# Patient Record
Sex: MALE | Race: BLACK OR AFRICAN AMERICAN | NOT HISPANIC OR LATINO | Employment: UNEMPLOYED | URBAN - METROPOLITAN AREA
[De-identification: names, ages, dates, MRNs, and addresses within clinical notes are randomized per-mention and may not be internally consistent; named-entity substitution may affect disease eponyms.]

---

## 2023-03-10 ENCOUNTER — APPOINTMENT (OUTPATIENT)
Dept: PRIMARY CARE | Facility: CLINIC | Age: 58
End: 2023-03-10
Payer: COMMERCIAL

## 2023-03-17 ENCOUNTER — APPOINTMENT (OUTPATIENT)
Dept: PRIMARY CARE | Facility: CLINIC | Age: 58
End: 2023-03-17
Payer: COMMERCIAL

## 2023-04-05 LAB
ALANINE AMINOTRANSFERASE (SGPT) (U/L) IN SER/PLAS: 17 U/L (ref 10–52)
ALBUMIN (G/DL) IN SER/PLAS: 4.2 G/DL (ref 3.4–5)
ALKALINE PHOSPHATASE (U/L) IN SER/PLAS: 70 U/L (ref 33–120)
ANION GAP IN SER/PLAS: 13 MMOL/L (ref 10–20)
ASPARTATE AMINOTRANSFERASE (SGOT) (U/L) IN SER/PLAS: 17 U/L (ref 9–39)
BASOPHILS (10*3/UL) IN BLOOD BY AUTOMATED COUNT: 0.06 X10E9/L (ref 0–0.1)
BASOPHILS/100 LEUKOCYTES IN BLOOD BY AUTOMATED COUNT: 1 % (ref 0–2)
BILIRUBIN TOTAL (MG/DL) IN SER/PLAS: 0.6 MG/DL (ref 0–1.2)
CALCIUM (MG/DL) IN SER/PLAS: 9.9 MG/DL (ref 8.6–10.6)
CARBON DIOXIDE, TOTAL (MMOL/L) IN SER/PLAS: 23 MMOL/L (ref 21–32)
CHLORIDE (MMOL/L) IN SER/PLAS: 108 MMOL/L (ref 98–107)
CREATININE (MG/DL) IN SER/PLAS: 2.02 MG/DL (ref 0.5–1.3)
EOSINOPHILS (10*3/UL) IN BLOOD BY AUTOMATED COUNT: 0.39 X10E9/L (ref 0–0.7)
EOSINOPHILS/100 LEUKOCYTES IN BLOOD BY AUTOMATED COUNT: 6.6 % (ref 0–6)
ERYTHROCYTE DISTRIBUTION WIDTH (RATIO) BY AUTOMATED COUNT: 19.7 % (ref 11.5–14.5)
ERYTHROCYTE MEAN CORPUSCULAR HEMOGLOBIN CONCENTRATION (G/DL) BY AUTOMATED: 30.3 G/DL (ref 32–36)
ERYTHROCYTE MEAN CORPUSCULAR VOLUME (FL) BY AUTOMATED COUNT: 95 FL (ref 80–100)
ERYTHROCYTES (10*6/UL) IN BLOOD BY AUTOMATED COUNT: 4.09 X10E12/L (ref 4.5–5.9)
GFR MALE: 37 ML/MIN/1.73M2
GLUCOSE (MG/DL) IN SER/PLAS: 87 MG/DL (ref 74–99)
HEMATOCRIT (%) IN BLOOD BY AUTOMATED COUNT: 39 % (ref 41–52)
HEMOGLOBIN (G/DL) IN BLOOD: 11.8 G/DL (ref 13.5–17.5)
IMMATURE GRANULOCYTES/100 LEUKOCYTES IN BLOOD BY AUTOMATED COUNT: 0.2 % (ref 0–0.9)
LEUKOCYTES (10*3/UL) IN BLOOD BY AUTOMATED COUNT: 5.9 X10E9/L (ref 4.4–11.3)
LYMPHOCYTES (10*3/UL) IN BLOOD BY AUTOMATED COUNT: 1.37 X10E9/L (ref 1.2–4.8)
LYMPHOCYTES/100 LEUKOCYTES IN BLOOD BY AUTOMATED COUNT: 23.2 % (ref 13–44)
MONOCYTES (10*3/UL) IN BLOOD BY AUTOMATED COUNT: 0.79 X10E9/L (ref 0.1–1)
MONOCYTES/100 LEUKOCYTES IN BLOOD BY AUTOMATED COUNT: 13.4 % (ref 2–10)
NEUTROPHILS (10*3/UL) IN BLOOD BY AUTOMATED COUNT: 3.28 X10E9/L (ref 1.2–7.7)
NEUTROPHILS/100 LEUKOCYTES IN BLOOD BY AUTOMATED COUNT: 55.6 % (ref 40–80)
NRBC (PER 100 WBCS) BY AUTOMATED COUNT: 0 /100 WBC (ref 0–0)
PLATELETS (10*3/UL) IN BLOOD AUTOMATED COUNT: 197 X10E9/L (ref 150–450)
POTASSIUM (MMOL/L) IN SER/PLAS: 5.4 MMOL/L (ref 3.5–5.3)
PROSTATE SPECIFIC AG (NG/ML) IN SER/PLAS: 1.05 NG/ML (ref 0–4)
PROTEIN TOTAL: 7.3 G/DL (ref 6.4–8.2)
RBC MORPHOLOGY IN BLOOD: NORMAL
SODIUM (MMOL/L) IN SER/PLAS: 139 MMOL/L (ref 136–145)
UREA NITROGEN (MG/DL) IN SER/PLAS: 21 MG/DL (ref 6–23)

## 2023-04-26 LAB
ANION GAP IN SER/PLAS: 11 MMOL/L (ref 10–20)
CALCIUM (MG/DL) IN SER/PLAS: 9.7 MG/DL (ref 8.6–10.6)
CARBON DIOXIDE, TOTAL (MMOL/L) IN SER/PLAS: 30 MMOL/L (ref 21–32)
CHLORIDE (MMOL/L) IN SER/PLAS: 102 MMOL/L (ref 98–107)
CREATININE (MG/DL) IN SER/PLAS: 2.46 MG/DL (ref 0.5–1.3)
GFR MALE: 29 ML/MIN/1.73M2
GLUCOSE (MG/DL) IN SER/PLAS: 86 MG/DL (ref 74–99)
POC CALCIUM IONIZED (MMOL/L) IN BLOOD: 1.28 MMOL/L (ref 1.1–1.33)
POTASSIUM (MMOL/L) IN SER/PLAS: 4.8 MMOL/L (ref 3.5–5.3)
SODIUM (MMOL/L) IN SER/PLAS: 138 MMOL/L (ref 136–145)
UREA NITROGEN (MG/DL) IN SER/PLAS: 37 MG/DL (ref 6–23)

## 2023-06-12 LAB
ALANINE AMINOTRANSFERASE (SGPT) (U/L) IN SER/PLAS: 48 U/L (ref 10–52)
ALBUMIN (G/DL) IN SER/PLAS: 4.6 G/DL (ref 3.4–5)
ALBUMIN (MG/L) IN URINE: 30.2 MG/L
ALBUMIN/CREATININE (UG/MG) IN URINE: 44.2 UG/MG CRT (ref 0–30)
ALKALINE PHOSPHATASE (U/L) IN SER/PLAS: 87 U/L (ref 33–120)
ANION GAP IN SER/PLAS: 19 MMOL/L (ref 10–20)
APPEARANCE, URINE: CLEAR
ASPARTATE AMINOTRANSFERASE (SGOT) (U/L) IN SER/PLAS: 33 U/L (ref 9–39)
BASOPHILS (10*3/UL) IN BLOOD BY AUTOMATED COUNT: 0.05 X10E9/L (ref 0–0.1)
BASOPHILS/100 LEUKOCYTES IN BLOOD BY AUTOMATED COUNT: 1.1 % (ref 0–2)
BILIRUBIN TOTAL (MG/DL) IN SER/PLAS: 1.4 MG/DL (ref 0–1.2)
BILIRUBIN, URINE: NEGATIVE
BLOOD, URINE: NEGATIVE
CALCIUM (MG/DL) IN SER/PLAS: 10.9 MG/DL (ref 8.6–10.6)
CARBON DIOXIDE, TOTAL (MMOL/L) IN SER/PLAS: 22 MMOL/L (ref 21–32)
CHLORIDE (MMOL/L) IN SER/PLAS: 94 MMOL/L (ref 98–107)
CHOLESTEROL (MG/DL) IN SER/PLAS: 89 MG/DL (ref 0–199)
CHOLESTEROL IN HDL (MG/DL) IN SER/PLAS: 35.3 MG/DL
CHOLESTEROL/HDL RATIO: 2.5
COLOR, URINE: YELLOW
CREATININE (MG/DL) IN SER/PLAS: 5.05 MG/DL (ref 0.5–1.3)
CREATININE (MG/DL) IN URINE: 68.3 MG/DL (ref 20–370)
EOSINOPHILS (10*3/UL) IN BLOOD BY AUTOMATED COUNT: 0.13 X10E9/L (ref 0–0.7)
EOSINOPHILS/100 LEUKOCYTES IN BLOOD BY AUTOMATED COUNT: 2.8 % (ref 0–6)
ERYTHROCYTE DISTRIBUTION WIDTH (RATIO) BY AUTOMATED COUNT: 17.1 % (ref 11.5–14.5)
ERYTHROCYTE MEAN CORPUSCULAR HEMOGLOBIN CONCENTRATION (G/DL) BY AUTOMATED: 33.5 G/DL (ref 32–36)
ERYTHROCYTE MEAN CORPUSCULAR VOLUME (FL) BY AUTOMATED COUNT: 93 FL (ref 80–100)
ERYTHROCYTES (10*6/UL) IN BLOOD BY AUTOMATED COUNT: 3.81 X10E12/L (ref 4.5–5.9)
ESTIMATED AVERAGE GLUCOSE FOR HBA1C: 114 MG/DL
GFR MALE: 12 ML/MIN/1.73M2
GLUCOSE (MG/DL) IN SER/PLAS: 80 MG/DL (ref 74–99)
GLUCOSE, URINE: NEGATIVE MG/DL
HEMATOCRIT (%) IN BLOOD BY AUTOMATED COUNT: 35.5 % (ref 41–52)
HEMOGLOBIN (G/DL) IN BLOOD: 11.9 G/DL (ref 13.5–17.5)
HEMOGLOBIN A1C/HEMOGLOBIN TOTAL IN BLOOD: 5.6 %
IMMATURE GRANULOCYTES/100 LEUKOCYTES IN BLOOD BY AUTOMATED COUNT: 0.2 % (ref 0–0.9)
KETONES, URINE: NEGATIVE MG/DL
LDL: 37 MG/DL (ref 0–99)
LEUKOCYTE ESTERASE, URINE: NEGATIVE
LEUKOCYTES (10*3/UL) IN BLOOD BY AUTOMATED COUNT: 4.7 X10E9/L (ref 4.4–11.3)
LYMPHOCYTES (10*3/UL) IN BLOOD BY AUTOMATED COUNT: 0.99 X10E9/L (ref 1.2–4.8)
LYMPHOCYTES/100 LEUKOCYTES IN BLOOD BY AUTOMATED COUNT: 21.2 % (ref 13–44)
MONOCYTES (10*3/UL) IN BLOOD BY AUTOMATED COUNT: 0.73 X10E9/L (ref 0.1–1)
MONOCYTES/100 LEUKOCYTES IN BLOOD BY AUTOMATED COUNT: 15.7 % (ref 2–10)
NEUTROPHILS (10*3/UL) IN BLOOD BY AUTOMATED COUNT: 2.75 X10E9/L (ref 1.2–7.7)
NEUTROPHILS/100 LEUKOCYTES IN BLOOD BY AUTOMATED COUNT: 59 % (ref 40–80)
NITRITE, URINE: NEGATIVE
NRBC (PER 100 WBCS) BY AUTOMATED COUNT: 0 /100 WBC (ref 0–0)
PARATHYRIN INTACT (PG/ML) IN SER/PLAS: 99.8 PG/ML (ref 18.5–88)
PH, URINE: 6 (ref 5–8)
PLATELETS (10*3/UL) IN BLOOD AUTOMATED COUNT: 293 X10E9/L (ref 150–450)
POTASSIUM (MMOL/L) IN SER/PLAS: 3.3 MMOL/L (ref 3.5–5.3)
PROSTATE SPECIFIC AG (NG/ML) IN SER/PLAS: 0.87 NG/ML (ref 0–4)
PROTEIN TOTAL: 7.9 G/DL (ref 6.4–8.2)
PROTEIN, URINE: NEGATIVE MG/DL
SODIUM (MMOL/L) IN SER/PLAS: 132 MMOL/L (ref 136–145)
SPECIFIC GRAVITY, URINE: 1.01 (ref 1–1.03)
TRIGLYCERIDE (MG/DL) IN SER/PLAS: 85 MG/DL (ref 0–149)
UREA NITROGEN (MG/DL) IN SER/PLAS: 65 MG/DL (ref 6–23)
UROBILINOGEN, URINE: <2 MG/DL (ref 0–1.9)
VLDL: 17 MG/DL (ref 0–40)

## 2023-06-27 LAB
ANION GAP IN SER/PLAS: 17 MMOL/L (ref 10–20)
CALCIUM (MG/DL) IN SER/PLAS: 10.4 MG/DL (ref 8.6–10.6)
CARBON DIOXIDE, TOTAL (MMOL/L) IN SER/PLAS: 19 MMOL/L (ref 21–32)
CHLORIDE (MMOL/L) IN SER/PLAS: 102 MMOL/L (ref 98–107)
CREATININE (MG/DL) IN SER/PLAS: 4.19 MG/DL (ref 0.5–1.3)
GFR MALE: 15 ML/MIN/1.73M2
GLUCOSE (MG/DL) IN SER/PLAS: 82 MG/DL (ref 74–99)
POTASSIUM (MMOL/L) IN SER/PLAS: 3.9 MMOL/L (ref 3.5–5.3)
SODIUM (MMOL/L) IN SER/PLAS: 134 MMOL/L (ref 136–145)
UREA NITROGEN (MG/DL) IN SER/PLAS: 74 MG/DL (ref 6–23)

## 2023-07-05 LAB
ANION GAP IN SER/PLAS: 13 MMOL/L (ref 10–20)
CALCIUM (MG/DL) IN SER/PLAS: 9.1 MG/DL (ref 8.6–10.6)
CARBON DIOXIDE, TOTAL (MMOL/L) IN SER/PLAS: 21 MMOL/L (ref 21–32)
CHLORIDE (MMOL/L) IN SER/PLAS: 111 MMOL/L (ref 98–107)
CREATININE (MG/DL) IN SER/PLAS: 2.84 MG/DL (ref 0.5–1.3)
GFR MALE: 25 ML/MIN/1.73M2
GLUCOSE (MG/DL) IN SER/PLAS: 90 MG/DL (ref 74–99)
POC CALCIUM IONIZED (MMOL/L) IN BLOOD: 1.25 MMOL/L (ref 1.1–1.33)
POTASSIUM (MMOL/L) IN SER/PLAS: 3.8 MMOL/L (ref 3.5–5.3)
SODIUM (MMOL/L) IN SER/PLAS: 141 MMOL/L (ref 136–145)
UREA NITROGEN (MG/DL) IN SER/PLAS: 36 MG/DL (ref 6–23)

## 2023-08-02 LAB
ANION GAP IN SER/PLAS: 15 MMOL/L (ref 10–20)
CALCIUM (MG/DL) IN SER/PLAS: 9.5 MG/DL (ref 8.6–10.6)
CARBON DIOXIDE, TOTAL (MMOL/L) IN SER/PLAS: 26 MMOL/L (ref 21–32)
CHLORIDE (MMOL/L) IN SER/PLAS: 103 MMOL/L (ref 98–107)
CREATININE (MG/DL) IN SER/PLAS: 2.45 MG/DL (ref 0.5–1.3)
GFR MALE: 29 ML/MIN/1.73M2
GLUCOSE (MG/DL) IN SER/PLAS: 79 MG/DL (ref 74–99)
POTASSIUM (MMOL/L) IN SER/PLAS: 4.5 MMOL/L (ref 3.5–5.3)
SODIUM (MMOL/L) IN SER/PLAS: 139 MMOL/L (ref 136–145)
UREA NITROGEN (MG/DL) IN SER/PLAS: 25 MG/DL (ref 6–23)

## 2023-08-04 LAB
CALCIDIOL (25 OH VITAMIN D3) (NG/ML) IN SER/PLAS: 29 NG/ML
URATE (MG/DL) IN SER/PLAS: 7.2 MG/DL (ref 4–7.5)

## 2023-08-30 ENCOUNTER — LAB (OUTPATIENT)
Dept: LAB | Facility: LAB | Age: 58
End: 2023-08-30
Payer: COMMERCIAL

## 2023-09-01 LAB
ALLERGEN ANIMAL: CAT DANDER IGE (KU/L): <0.1 KU/L
ALLERGEN ANIMAL: DOG DANDER IGE (KU/L): <0.1 KU/L
ALLERGEN GRASS: BERMUDA GRASS (CYNODON DACTYLON) IGE (KU/L): <0.1 KU/L
ALLERGEN GRASS: JOHNSON GRASS (SORGHUM HALEPENSE) IGE (KU/L): <0.1 KU/L
ALLERGEN GRASS: MEADOW GRASS, KENTUCKY BLUE (POA PRATENSIS )IGE (KU/L): <0.1 KU/L
ALLERGEN GRASS: TIMOTHY GRASS (PHLEUM PRATENSE) IGE (KU/L): <0.1 KU/L
ALLERGEN INSECT: COCKROACH IGE: <0.1 KU/L
ALLERGEN MICROORGANISM: ALTERNARIA ALTERNATA IGE (KU/L): <0.1 KU/L
ALLERGEN MICROORGANISM: ASPERGILLUS FUMIGATUS IGE (KU/L): <0.1 KU/L
ALLERGEN MICROORGANISM: CLADOSPORIUM HERBARUM IGE (KU/L): <0.1 KU/L
ALLERGEN MICROORGANISM: PENICILLIUM CHRYSOGENUM (P. NOTATUM) IGE (KU/L): <0.1 KU/L
ALLERGEN MITE: DERMATOPHAGOIDES FARINAE (HOUSE DUST MITE) IGE (KU/L): <0.1 KU/L
ALLERGEN MITE: DERMATOPHAGOIDES PTERONYSSINUS (HOUSE DUST MITE) IGE (KU/L): <0.1 KU/L
ALLERGEN TREE: BOX-ELDER (ACER NEGUNDO) IGE (KU/L): <0.1 KU/L
ALLERGEN TREE: COMMON SILVER BIRCH (BETULA VERRUCOSA) IGE (KU/L): <0.1 KU/L
ALLERGEN TREE: COTTONWOOD (POPULUS DELTOIDES) IGE (KU/L): <0.1 KU/L
ALLERGEN TREE: ELM (ULMUS AMERICANA) IGE (KU/L): <0.1 KU/L
ALLERGEN TREE: MAPLE LEAF SYCAMORE, LONDON PLANE IGE (KU/L): <0.1 KU/L
ALLERGEN TREE: MOUNTAIN JUNIPER (JUNIPERUS SABINOIDES) IGE (KU/L): <0.1 KU/L
ALLERGEN TREE: MULBERRY (MORUS ALBA) IGE (KU/L): <0.1 KU/L
ALLERGEN TREE: OAK (QUERCUS ALBA) IGE (KU/L): <0.1 KU/L
ALLERGEN TREE: PECAN, HICKORY (CARYA PECAN) IGE (KU/L): <0.1 KU/L
ALLERGEN TREE: WALNUT IGE: <0.1 KU/L
ALLERGEN TREE: WHITE ASH (FRAXINUS AMERICANA) IGE (KU/L): <0.1 KU/L
ALLERGEN WEED: COMMON PIGWEED (AMARANTHUS RETROFLEXUS) IGE (KU/L): <0.1 KU/L
ALLERGEN WEED: COMMON RAGWEED (AMB. ARTEMISIIFOLIA/A. ELATIOR) IGE (KU/L): <0.1 KU/L
ALLERGEN WEED: GOOSEFOOT, LAMB'S QUARTERS (CHENOPODIUM ALBUM) IGE (KU/L): <0.1 KU/L
ALLERGEN WEED: PLANTAIN (ENGLISH), RIBWORT (PLANTAGO LANCEOLATA) IGE (KU/L): <0.1 KU/L
ALLERGEN WEED: PRICKLY SALTWORT/RUSSIAN THISTLE (SALSOLA KALI) IGE (KU/L): <0.1 KU/L
ALLERGEN WEED: SHEEP SORREL (RUMEX ACETOSELLA) IGE (KU/L): <0.1 KU/L
IMMUNOCAP IGE: 9.9 KU/L (ref 0–214)
IMMUNOCAP INTERPRETATION: NORMAL

## 2023-10-20 ENCOUNTER — LAB (OUTPATIENT)
Dept: LAB | Facility: LAB | Age: 58
End: 2023-10-20
Payer: COMMERCIAL

## 2023-10-20 ENCOUNTER — OFFICE VISIT (OUTPATIENT)
Dept: RHEUMATOLOGY | Facility: CLINIC | Age: 58
End: 2023-10-20
Payer: COMMERCIAL

## 2023-10-20 VITALS
TEMPERATURE: 97.4 F | HEART RATE: 77 BPM | DIASTOLIC BLOOD PRESSURE: 63 MMHG | BODY MASS INDEX: 35.61 KG/M2 | HEIGHT: 68 IN | WEIGHT: 235 LBS | SYSTOLIC BLOOD PRESSURE: 93 MMHG

## 2023-10-20 DIAGNOSIS — N18.30 CHRONIC KIDNEY DISEASE, STAGE 3 UNSPECIFIED (MULTI): ICD-10-CM

## 2023-10-20 DIAGNOSIS — M32.9 SYSTEMIC LUPUS ERYTHEMATOSUS, UNSPECIFIED SLE TYPE, UNSPECIFIED ORGAN INVOLVEMENT STATUS (MULTI): Primary | ICD-10-CM

## 2023-10-20 DIAGNOSIS — M32.9 SYSTEMIC LUPUS ERYTHEMATOSUS, UNSPECIFIED (MULTI): ICD-10-CM

## 2023-10-20 DIAGNOSIS — M32.9 SYSTEMIC LUPUS ERYTHEMATOSUS, UNSPECIFIED (MULTI): Primary | ICD-10-CM

## 2023-10-20 LAB
ALBUMIN SERPL BCP-MCNC: 4.7 G/DL (ref 3.4–5)
ALP SERPL-CCNC: 76 U/L (ref 33–136)
ALT SERPL W P-5'-P-CCNC: 25 U/L (ref 10–52)
ANION GAP SERPL CALC-SCNC: 17 MMOL/L (ref 10–20)
AST SERPL W P-5'-P-CCNC: 20 U/L (ref 9–39)
BILIRUB SERPL-MCNC: 0.9 MG/DL (ref 0–1.2)
BUN SERPL-MCNC: 57 MG/DL (ref 6–23)
C3 SERPL-MCNC: 161 MG/DL (ref 87–200)
CALCIUM SERPL-MCNC: 10 MG/DL (ref 8.6–10.6)
CHLORIDE SERPL-SCNC: 102 MMOL/L (ref 98–107)
CHOLEST SERPL-MCNC: 121 MG/DL (ref 0–199)
CHOLESTEROL/HDL RATIO: 2.6
CO2 SERPL-SCNC: 23 MMOL/L (ref 21–32)
CREAT SERPL-MCNC: 3.93 MG/DL (ref 0.5–1.3)
CRP SERPL-MCNC: 0.4 MG/DL
ERYTHROCYTE [DISTWIDTH] IN BLOOD BY AUTOMATED COUNT: 14.1 % (ref 11.5–14.5)
ERYTHROCYTE [SEDIMENTATION RATE] IN BLOOD BY WESTERGREN METHOD: 22 MM/H (ref 0–20)
GFR SERPL CREATININE-BSD FRML MDRD: 17 ML/MIN/1.73M*2
GLUCOSE SERPL-MCNC: 82 MG/DL (ref 74–99)
HCT VFR BLD AUTO: 38.2 % (ref 41–52)
HDLC SERPL-MCNC: 47.2 MG/DL
HGB BLD-MCNC: 12.4 G/DL (ref 13.5–17.5)
LDLC SERPL CALC-MCNC: 56 MG/DL
MCH RBC QN AUTO: 33.6 PG (ref 26–34)
MCHC RBC AUTO-ENTMCNC: 32.5 G/DL (ref 32–36)
MCV RBC AUTO: 104 FL (ref 80–100)
NON HDL CHOLESTEROL: 74 MG/DL (ref 0–149)
NRBC BLD-RTO: ABNORMAL /100{WBCS}
PLATELET # BLD AUTO: 223 X10*3/UL (ref 150–450)
PMV BLD AUTO: 10.4 FL (ref 7.5–11.5)
POTASSIUM SERPL-SCNC: 4.2 MMOL/L (ref 3.5–5.3)
PROT SERPL-MCNC: 7.3 G/DL (ref 6.4–8.2)
RBC # BLD AUTO: 3.69 X10*6/UL (ref 4.5–5.9)
SODIUM SERPL-SCNC: 138 MMOL/L (ref 136–145)
TRIGL SERPL-MCNC: 88 MG/DL (ref 0–149)
VLDL: 18 MG/DL (ref 0–40)
WBC # BLD AUTO: 4.7 X10*3/UL (ref 4.4–11.3)

## 2023-10-20 PROCEDURE — 86038 ANTINUCLEAR ANTIBODIES: CPT

## 2023-10-20 PROCEDURE — 86160 COMPLEMENT ANTIGEN: CPT

## 2023-10-20 PROCEDURE — 80053 COMPREHEN METABOLIC PANEL: CPT

## 2023-10-20 PROCEDURE — 86140 C-REACTIVE PROTEIN: CPT

## 2023-10-20 PROCEDURE — 36415 COLL VENOUS BLD VENIPUNCTURE: CPT

## 2023-10-20 PROCEDURE — 86225 DNA ANTIBODY NATIVE: CPT

## 2023-10-20 PROCEDURE — 85027 COMPLETE CBC AUTOMATED: CPT

## 2023-10-20 PROCEDURE — 83970 ASSAY OF PARATHORMONE: CPT

## 2023-10-20 PROCEDURE — 99214 OFFICE O/P EST MOD 30 MIN: CPT | Performed by: INTERNAL MEDICINE

## 2023-10-20 PROCEDURE — 86235 NUCLEAR ANTIGEN ANTIBODY: CPT

## 2023-10-20 PROCEDURE — 80061 LIPID PANEL: CPT

## 2023-10-20 PROCEDURE — 82570 ASSAY OF URINE CREATININE: CPT

## 2023-10-20 PROCEDURE — 3008F BODY MASS INDEX DOCD: CPT | Performed by: INTERNAL MEDICINE

## 2023-10-20 PROCEDURE — 85652 RBC SED RATE AUTOMATED: CPT

## 2023-10-20 PROCEDURE — 99204 OFFICE O/P NEW MOD 45 MIN: CPT | Performed by: INTERNAL MEDICINE

## 2023-10-20 PROCEDURE — 84156 ASSAY OF PROTEIN URINE: CPT

## 2023-10-20 RX ORDER — PANTOPRAZOLE SODIUM 40 MG/1
TABLET, DELAYED RELEASE ORAL
COMMUNITY
Start: 2023-09-25

## 2023-10-20 RX ORDER — IBUPROFEN 100 MG/5ML
SUSPENSION, ORAL (FINAL DOSE FORM) ORAL
COMMUNITY
End: 2023-11-28 | Stop reason: ALTCHOICE

## 2023-10-20 RX ORDER — FLUTICASONE PROPIONATE 50 MCG
1 SPRAY, SUSPENSION (ML) NASAL
COMMUNITY
Start: 2019-10-18 | End: 2023-11-28 | Stop reason: ALTCHOICE

## 2023-10-20 RX ORDER — ATORVASTATIN CALCIUM 20 MG/1
20 TABLET, FILM COATED ORAL
COMMUNITY
Start: 2017-08-11

## 2023-10-20 RX ORDER — ALBUTEROL SULFATE 0.83 MG/ML
2.5 SOLUTION RESPIRATORY (INHALATION)
COMMUNITY
Start: 2016-10-11 | End: 2023-12-05 | Stop reason: ENTERED-IN-ERROR

## 2023-10-20 RX ORDER — ATENOLOL 100 MG/1
100 TABLET ORAL
COMMUNITY
Start: 2022-05-06

## 2023-10-20 RX ORDER — ACETAMINOPHEN 500 MG
2000 TABLET ORAL DAILY
COMMUNITY
Start: 2023-10-16

## 2023-10-20 RX ORDER — SPIRONOLACTONE 25 MG/1
TABLET ORAL
COMMUNITY
Start: 2023-10-13

## 2023-10-20 RX ORDER — FUROSEMIDE 20 MG/1
1 TABLET ORAL DAILY
COMMUNITY
Start: 2018-04-17

## 2023-10-20 RX ORDER — FLUTICASONE PROPIONATE 220 UG/1
2 AEROSOL, METERED RESPIRATORY (INHALATION) 2 TIMES DAILY
COMMUNITY
Start: 2019-02-08 | End: 2023-11-01 | Stop reason: WASHOUT

## 2023-10-20 RX ORDER — ALBUTEROL SULFATE 90 UG/1
2 AEROSOL, METERED RESPIRATORY (INHALATION) EVERY 6 HOURS PRN
COMMUNITY

## 2023-10-20 RX ORDER — TRAMADOL HYDROCHLORIDE 50 MG/1
50 TABLET ORAL 4 TIMES DAILY PRN
COMMUNITY
Start: 2023-10-12

## 2023-10-20 RX ORDER — FLUTICASONE PROPIONATE AND SALMETEROL 50; 500 UG/1; UG/1
1 POWDER RESPIRATORY (INHALATION) 2 TIMES DAILY
COMMUNITY
End: 2023-11-28 | Stop reason: ALTCHOICE

## 2023-10-20 RX ORDER — HYDROXYCHLOROQUINE SULFATE 200 MG/1
200 TABLET, FILM COATED ORAL
COMMUNITY
Start: 2023-03-09

## 2023-10-20 RX ORDER — DULOXETIN HYDROCHLORIDE 60 MG/1
60 CAPSULE, DELAYED RELEASE ORAL DAILY
COMMUNITY
Start: 2023-10-16

## 2023-10-20 RX ORDER — HYDROXYZINE HYDROCHLORIDE 50 MG/1
TABLET, FILM COATED ORAL
COMMUNITY
Start: 2023-09-26

## 2023-10-20 RX ORDER — FLUTICASONE PROPIONATE AND SALMETEROL 500; 50 UG/1; UG/1
1 POWDER RESPIRATORY (INHALATION) 2 TIMES DAILY
COMMUNITY
Start: 2023-03-09 | End: 2023-11-01 | Stop reason: WASHOUT

## 2023-10-20 RX ORDER — BUPROPION HYDROCHLORIDE 150 MG/1
TABLET ORAL
COMMUNITY
Start: 2023-10-11

## 2023-10-20 RX ORDER — ALLOPURINOL 100 MG/1
100 TABLET ORAL DAILY
COMMUNITY

## 2023-10-20 ASSESSMENT — PAIN SCALES - GENERAL: PAINLEVEL: 8

## 2023-10-20 NOTE — PROGRESS NOTES
Subjective   Patient ID: Allen Vela is a 58 y.o. male who presents for New Patient Visit (Patient is new).    HPI  57 yo male with lupus and CKD stage III  In 2009, he was diagnosed with SLE in [facility].  No known h/o nephritis  He used HCQ and PRD prn  He had fatigue, joint pain at that time, he does not remember any skin rashes.  Also, the patient reports left shoulder pain and he was diagnosed with left rotator cuff tendinitis.  He also endorses neck pain.  He is using HCQ twice daily, but his physician tapered down its dose 200 mg daily due to no f/u with Ophthalmologist.  His last eye exam was 5 years ago.    ROS  Joint pain in hands: negative  Joint swelling: negative  Morning stiffness and duration: negative    strength: normal  Oral ulcer: negative  Genital ulcer: negative  Raynaud phenomenon: negative  Chest pain/dyspnea: negative  Low back pain: negative  Visual problem: negative  Dry eyes/dry mouth: negative  Skin rash/scaling/psoriasis: negative       Objective     PEXAM  VS reviewed, WNL  General: Alert, no distress   HEENT: Normocephalic/atraumatic, No alopecia. PERRLA. Sclera white, conjunctiva pink, no malar rash. no oral or nasal ulcer. Oral cavity pink and moist, no erythema or exudate, dentition good.   Neck: supple  Respiratory: CTA B, no adventitious breath sounds  Cardiac: RRR, no murmurs, carotid, or bruits  Abdominal: symmetrical, soft, non-tender, non-distended, normoactive BSx4 quadrants, no CVA tenderness or suprapubic tenderness  MSK: Joints of upper and lower extremities were assessed for synovitis and ROM.    +tender left shoulder  No active synovitis  Extremities: no clubbing, no cyanosis, no edema  Skin: Skin warm and moist.   Neuro: non-focal, Strength 5/5 throughout. Normal gait. No cerebellar pathologic exam     Assessment/Plan   57 yo male with lupus and CKD stage III (not related to his SLE)  SLE dx in 2009 based on joint pain.  No nephritis or other major organ  involvement  Currently, no active lupus findings.  PExam showed only left shoulder tenderness.    -will see lupus activation markers, urine and DOMINIQUE (no records in his chart)  -will see an Ophthalmologist, and continue   -will see him in 4 months

## 2023-10-21 LAB
CREAT UR-MCNC: 59.7 MG/DL (ref 20–370)
PROT UR-ACNC: 6 MG/DL (ref 5–25)
PROT/CREAT UR: 0.1 MG/MG CREAT (ref 0–0.17)
PTH-INTACT SERPL-MCNC: 112.6 PG/ML (ref 18.5–88)

## 2023-10-23 ENCOUNTER — PATIENT MESSAGE (OUTPATIENT)
Dept: RHEUMATOLOGY | Facility: CLINIC | Age: 58
End: 2023-10-23
Payer: COMMERCIAL

## 2023-10-23 DIAGNOSIS — M32.9 SYSTEMIC LUPUS ERYTHEMATOSUS, UNSPECIFIED SLE TYPE, UNSPECIFIED ORGAN INVOLVEMENT STATUS (MULTI): Primary | ICD-10-CM

## 2023-10-23 LAB
ANA PATTERN: ABNORMAL
ANA SER QL HEP2 SUBST: POSITIVE
ANA TITR SER IF: ABNORMAL {TITER}
CENTROMERE B AB SER-ACNC: <0.2 AI
CHROMATIN AB SERPL-ACNC: 6.3 AI
DSDNA AB SER-ACNC: 6 IU/ML
ENA JO1 AB SER QL IA: <0.2 AI
ENA RNP AB SER IA-ACNC: 6.7 AI
ENA SCL70 AB SER QL IA: <0.2 AI
ENA SM AB SER IA-ACNC: <0.2 AI
ENA SM+RNP AB SER QL IA: >8 AI
ENA SS-A AB SER IA-ACNC: <0.2 AI
ENA SS-B AB SER IA-ACNC: <0.2 AI
RIBOSOMAL P AB SER-ACNC: <0.2 AI

## 2023-10-27 ENCOUNTER — APPOINTMENT (OUTPATIENT)
Dept: OPHTHALMOLOGY | Facility: CLINIC | Age: 58
End: 2023-10-27
Payer: COMMERCIAL

## 2023-11-01 ENCOUNTER — OFFICE VISIT (OUTPATIENT)
Dept: PULMONOLOGY | Facility: HOSPITAL | Age: 58
End: 2023-11-01
Payer: COMMERCIAL

## 2023-11-01 VITALS
BODY MASS INDEX: 39.36 KG/M2 | TEMPERATURE: 98.3 F | OXYGEN SATURATION: 98 % | SYSTOLIC BLOOD PRESSURE: 123 MMHG | DIASTOLIC BLOOD PRESSURE: 80 MMHG | HEART RATE: 80 BPM | WEIGHT: 259.7 LBS | HEIGHT: 68 IN | RESPIRATION RATE: 16 BRPM

## 2023-11-01 DIAGNOSIS — J42 CHRONIC BRONCHITIS, UNSPECIFIED CHRONIC BRONCHITIS TYPE (MULTI): Primary | ICD-10-CM

## 2023-11-01 PROCEDURE — 3008F BODY MASS INDEX DOCD: CPT | Performed by: INTERNAL MEDICINE

## 2023-11-01 PROCEDURE — 3079F DIAST BP 80-89 MM HG: CPT | Performed by: INTERNAL MEDICINE

## 2023-11-01 PROCEDURE — 99213 OFFICE O/P EST LOW 20 MIN: CPT | Mod: GC | Performed by: INTERNAL MEDICINE

## 2023-11-01 PROCEDURE — 1036F TOBACCO NON-USER: CPT | Performed by: INTERNAL MEDICINE

## 2023-11-01 PROCEDURE — 99213 OFFICE O/P EST LOW 20 MIN: CPT | Performed by: INTERNAL MEDICINE

## 2023-11-01 PROCEDURE — 3074F SYST BP LT 130 MM HG: CPT | Performed by: INTERNAL MEDICINE

## 2023-11-01 SDOH — ECONOMIC STABILITY: FOOD INSECURITY: WITHIN THE PAST 12 MONTHS, YOU WORRIED THAT YOUR FOOD WOULD RUN OUT BEFORE YOU GOT MONEY TO BUY MORE.: OFTEN TRUE

## 2023-11-01 SDOH — ECONOMIC STABILITY: FOOD INSECURITY: WITHIN THE PAST 12 MONTHS, THE FOOD YOU BOUGHT JUST DIDN'T LAST AND YOU DIDN'T HAVE MONEY TO GET MORE.: OFTEN TRUE

## 2023-11-01 ASSESSMENT — ENCOUNTER SYMPTOMS
OCCASIONAL FEELINGS OF UNSTEADINESS: 0
LOSS OF SENSATION IN FEET: 0
DEPRESSION: 0

## 2023-11-01 ASSESSMENT — PAIN SCALES - GENERAL: PAINLEVEL: 8

## 2023-11-01 NOTE — PROGRESS NOTES
"  Reason For Follow Up: COPD    History Of Present Illness  Allen Vela is a 58 y.o. male with Asthma, COPD (no PFTs on record), SLE (on Plaquenil), CKD, HTN, DLD who presents to pulmonary clinic for follow up.     He was last seen 08/2023 to establish care for COPD. He was planned for PFTs which he has not yet completed. Since then reports breathing is stable. No additional hospitalizations for respiratory symptoms including pneumonia. He denies chest pain, orthopnea, PND. Previously had LE edema which is now resolved. He previously had severe GERD symptoms which are now under well controlled. He was previously staying at an apartment with mold issues, had severe allergic rhinitis symptoms which are now resolved.     12 point ROS is negative except noted above     Past Medical History  Past Medical History:   Diagnosis Date    COPD (chronic obstructive pulmonary disease) (CMS/[facility])     Depression     Gout     Hypertension     Torn rotator cuff     Ulcer of abdomen wall (CMS/[facility])        Surgical History  History reviewed. No pertinent surgical history.    Family History  Family History   Problem Relation Name Age of Onset    COPD Mother      Heart failure Mother      Heart failure Father      Diabetes Maternal Grandmother          Social History  Social History     Tobacco Use    Smoking status: Former     Packs/day: 0.50     Years: 30.00     Additional pack years: 0.00     Total pack years: 15.00     Types: Cigarettes    Smokeless tobacco: Never   Substance Use Topics    Alcohol use: Never     Comment: occassional drinker    Drug use: Never      Allergies  Montelukast and Ace inhibitors    Vital Signs  Blood pressure 123/80, pulse 80, temperature 36.8 °C (98.3 °F), temperature source Temporal, resp. rate 16, height 1.727 m (5' 8\"), weight 118 kg (259 lb 11.2 oz), SpO2 98 %.  Oxygen Therapy  SpO2: 98 %              Physical Exam  Gen: Pleasant, no acute distress  HEENT: no gross abnormalities  CV: RRR  Lungs: " CTAB  GI: soft, non tender, non distended  Ext: no LE edema  Neuro: AOx3, motor and sensory grossly intact  Psych: appears appropriate      Relevant Results  XR chest 2 view     Narrative  Interpreted By:  GISELA DIOP MD  MRN: [id number]  Patient Name: GILBERT PRICE    STUDY:  TH CHEST 2 VIEW PA AND LAT;  4/11/2023 3:44 pm    INDICATION:  Pneumonia, unspecified organism.    COMPARISON:  Radiograph dated 03/03/2023    ACCESSION NUMBER(S):  [id number]    ORDERING CLINICIAN:  ADORE SIFUENTES    FINDINGS:  The cardiac silhouette size is within normal limits. There is no  focal consolidation, edema or pneumothorax. No sizeable pleural  effusion. No acute osseous abnormality.    Impression  No radiographic evidence of acute cardiopulmonary process.    No PFTs on record  CT Chest 05/2023: resolution of prior infiltrates, no acute finding otherwise, enlarged main pulmonary artery  Echo 03/2023: EF 60-65%, diastolic dysfunction, moderately elevated RVSP 56.6, normal RV function    CT CHEST WO IV CONTRAST    - Impression -  1.  Resolution of infiltrates in the chest. No acute finding seen  detected. Enlarged main pulmonary artery compatible pulmonary  arterial hypertension      Assessment/Plan   Mr Queen is a 59 yo man with PMHx s/f Asthma, COPD (no PFTs on record), SLE (on Plaquenil), CKD, HTN, DLD who presents to pulmonary clinic to follow up.      #COPD  -PFTs re-ordered  -immunoglobulin levels ordered  -given frequent pneumonias, will stop Advair and start Stiolto, continue Albuterol PRN  -referral to pulmonary rehab pending above  -UTD with vaccines     #Lung cancer screening  -due for LDCT 05/2024->ordered      Roberta Lino MD        R41.89

## 2023-11-01 NOTE — PATIENT INSTRUCTIONS
Thanks for coming in today!  Here is what we discussed:  -I have reordered breathing tests (pulmonary function tests)  -will stop your advair given increased risk of pneumonia  -Start Stiolto  -repeat cat scan ordered for may 2024  -follow up in clinic after imaging

## 2023-11-03 RX ORDER — PREDNISONE 5 MG/1
10 TABLET ORAL DAILY
Qty: 20 TABLET | Refills: 0 | Status: SHIPPED | OUTPATIENT
Start: 2023-11-03 | End: 2023-11-13

## 2023-11-07 ENCOUNTER — ANCILLARY PROCEDURE (OUTPATIENT)
Dept: RADIOLOGY | Facility: CLINIC | Age: 58
End: 2023-11-07
Payer: COMMERCIAL

## 2023-11-07 ENCOUNTER — OFFICE VISIT (OUTPATIENT)
Dept: NEUROSURGERY | Facility: CLINIC | Age: 58
End: 2023-11-07
Payer: COMMERCIAL

## 2023-11-07 VITALS
HEART RATE: 81 BPM | WEIGHT: 263 LBS | HEIGHT: 68 IN | RESPIRATION RATE: 16 BRPM | DIASTOLIC BLOOD PRESSURE: 87 MMHG | BODY MASS INDEX: 39.86 KG/M2 | TEMPERATURE: 97 F | SYSTOLIC BLOOD PRESSURE: 126 MMHG

## 2023-11-07 DIAGNOSIS — M54.2 CERVICALGIA: ICD-10-CM

## 2023-11-07 DIAGNOSIS — M54.2 CERVICALGIA: Primary | ICD-10-CM

## 2023-11-07 DIAGNOSIS — M48.062 NEUROGENIC CLAUDICATION DUE TO LUMBAR SPINAL STENOSIS: ICD-10-CM

## 2023-11-07 PROCEDURE — 72040 X-RAY EXAM NECK SPINE 2-3 VW: CPT | Performed by: RADIOLOGY

## 2023-11-07 PROCEDURE — 3008F BODY MASS INDEX DOCD: CPT | Performed by: NURSE PRACTITIONER

## 2023-11-07 PROCEDURE — 99203 OFFICE O/P NEW LOW 30 MIN: CPT | Performed by: NURSE PRACTITIONER

## 2023-11-07 PROCEDURE — 1036F TOBACCO NON-USER: CPT | Performed by: NURSE PRACTITIONER

## 2023-11-07 PROCEDURE — 72040 X-RAY EXAM NECK SPINE 2-3 VW: CPT | Mod: FY

## 2023-11-07 PROCEDURE — 99213 OFFICE O/P EST LOW 20 MIN: CPT | Performed by: NURSE PRACTITIONER

## 2023-11-07 ASSESSMENT — PAIN SCALES - GENERAL: PAINLEVEL: 5

## 2023-11-07 NOTE — PROGRESS NOTES
Chief Complaint:   Allen Vela is a 58 y.o. year old man here for eval of both neck and lower back pain.    HPI  Allen Vela is a very nice 58 year old man with Hx of SLE, obesity, HRN, COPD, CKD, presents with complaints of chronic lower back pain and chronic neck pain.  He reports chronic fatigue and diffuse joint pain that is unremitting and debilitating.  He reports left neck pain and stiffness radiating to paraspinal muscles infrascapular, radiates to left shoulder and down left arm with some associated numbness and also has problems with rotator cuff.  Denies weakness or problems with fine motor function.  Also complains of lower back pain worse in the morning with standing getting out of bed, and with walking makes his legs feel fatigued and he has to stop.  Has difficulty finding comfortable position.  No numbness or tingling in lower extremities.  Denies problems with bowel bladder, does affirm gait issues due to pain.  He has tried multiple medications including steroids, tramadol, OTCs, SLE Tx Plaquenil, and Cymbalta, Wellbutrin.  He has done PM and PT in the past without much relief.    Review of Systems  All other systems reviewed and negative other than what is already stated in this note.      Past Medical History:   Diagnosis Date    COPD (chronic obstructive pulmonary disease) (CMS/[facility])     Depression     Gout     Hypertension     Torn rotator cuff     Ulcer of abdomen wall (CMS/[facility])        Patient Active Problem List   Diagnosis    Cervicalgia    Neurogenic claudication due to lumbar spinal stenosis       No past surgical history on file.    Family History   Problem Relation Name Age of Onset    COPD Mother      Heart failure Mother      Heart failure Father      Diabetes Maternal Grandmother         Social History     Tobacco Use    Smoking status: Former     Packs/day: 0.50     Years: 30.00     Additional pack years: 0.00     Total pack years: 15.00     Types: Cigarettes    Smokeless tobacco:  Never   Substance Use Topics    Alcohol use: Never     Comment: occassional drinker    Drug use: Never         Current Outpatient Medications:     Advair Diskus 500-50 mcg/dose diskus inhaler, Inhale 1 puff 2 times a day., Disp: , Rfl:     albuterol 2.5 mg /3 mL (0.083 %) nebulizer solution, Inhale 3 mL (2.5 mg)., Disp: , Rfl:     allopurinol (Zyloprim) 100 mg tablet, Take 1 tablet (100 mg) by mouth once daily., Disp: , Rfl:     ascorbic acid (Vitamin C) 1,000 mg tablet, Take by mouth., Disp: , Rfl:     atenolol (Tenormin) 100 mg tablet, Take 1 tablet (100 mg) by mouth once daily., Disp: , Rfl:     atorvastatin (Lipitor) 20 mg tablet, Take 1 tablet (20 mg) by mouth once daily., Disp: , Rfl:     buPROPion XL (Wellbutrin XL) 150 mg 24 hr tablet, , Disp: , Rfl:     CALCIUM 26-VIT D3-MAGNESIUM 15 ORAL, Takes by mouth once daily, Disp: , Rfl:     cholecalciferol (Vitamin D-3) 50 mcg (2,000 unit) capsule, Take 1 capsule (2,000 Units) by mouth once daily., Disp: , Rfl:     DULoxetine (Cymbalta) 60 mg DR capsule, Take 1 capsule (60 mg) by mouth once daily., Disp: , Rfl:     esketamine (Spravato) 2 sprays/28 mg per device spray,non-aerosol nasal spray, Administer 42 mg into each nostril 2 times a week., Disp: , Rfl:     fluticasone (Flonase) 50 mcg/actuation nasal spray, Administer 1 spray into affected nostril(s) once daily., Disp: , Rfl:     furosemide (Lasix) 20 mg tablet, Take 1 tablet (20 mg) by mouth once daily., Disp: , Rfl:     hydroxychloroquine (Plaquenil) 200 mg tablet, Take 1 tablet (200 mg) by mouth once daily., Disp: , Rfl:     hydrOXYzine HCL (Atarax) 50 mg tablet, , Disp: , Rfl:     pantoprazole (ProtoNix) 40 mg EC tablet, , Disp: , Rfl:     predniSONE (Deltasone) 5 mg tablet, Take 2 tablets (10 mg) by mouth once daily for 10 days., Disp: 20 tablet, Rfl: 0    spironolactone (Aldactone) 25 mg tablet, , Disp: , Rfl:     tiotropium-olodateroL (Stiolto Respimat) 2.5-2.5 mcg/actuation mist inhaler, Inhale 2  Inhalations once daily., Disp: 1 g, Rfl: 3    traMADol (Ultram) 50 mg tablet, Take 1 tablet (50 mg) by mouth 4 times a day., Disp: , Rfl:     umeclidinium (Incruse Ellipta) 62.5 mcg/actuation inhalation, Inhale 1 puff (62.5 mcg) once daily., Disp: , Rfl:     Ventolin HFA 90 mcg/actuation inhaler, Inhale 2 puffs every 6 hours if needed., Disp: , Rfl:         Objective   Vitals:    11/07/23 1424   BP: 126/87   Pulse: 81   Resp: 16   Temp: 36.1 °C (97 °F)     Exam  no acute distress, chronic appearance, rather depressed affect  normal sclera  moist mucus membranes  no peripheral edema   symmetric chest rise  nondistended abdomen  alert and oriented, extraocular movements intact  Full strength upper extremities, no Mike's reflex.  Bilateral hip flexors 4 out of 5 effort limited secondary to pain.  5 out of 5 knee flex ex and plantarflexion dorsiflexion.  Reflexes within normal limits.   Normal sensation to light touch throughout  Gait slow and antalgic.    Study Result    Narrative & Impression   Interpreted By:  ONUR BAHENA MD  MRN: [id number]  Patient Name: GILBERT PRICE     STUDY:  Lumbar Spine, 5 views.     INDICATION:  Low back pain, unspecified.     COMPARISON:  None.     ACCESSION NUMBER(S):  [id number]     ORDERING CLINICIAN:  ADORE SIFUENTES     FINDINGS:  Mild dextroscoliosis noted centered at the thoracolumbar junction  spine.  Mild spondylosis and facet arthropathy at L2-3 and L3-4. Moderate  spondylosis and facet arthropathy at L4-5 and L5-S1.     No spondylolysis on the oblique views.  Vertebral body heights are preserved. Posterior elements are intact.     IMPRESSION:  1. Moderate L4-5 and L5-S1 degenerative changes. Mild L2-3 and L3-4  degenerative changes.  2. Mild levoscoliosis noted centered at the thoracolumbar junction  spine.       Assessment/Plan   The primary encounter diagnosis was Cervicalgia. A diagnosis of Neurogenic claudication due to lumbar spinal stenosis was also pertinent to  this visit.  I have personally reviewed lumbar spine x-ray done 5/12/2023 that shows multilevel moderate degenerative changes.  He has no cervical spine imaging to review.  Reported symptoms are diffuse without specific dermatomal pattern.  Difficult to differentiate between his SLE versus specific nerve root compression as source of pain.  Recommendations:  - Upright AP lateral x-rays C-spine ordered  - Refer to physical therapy and pain management  - Patient should continue to see his established rheumatologist for management of his SLE.  - Consider MRI if patient fails conservative medical therapy.   - Follow-up as needed pending completion of PT and eval by PM.    Damaris Cano, APRN-CNP     This note was created in part after personal review of documents in EMR including recent labs and available radiologic imaging. Total time spent in review of EMR, relevant imaging, time with patient and completion of this document is 30 minutes.

## 2023-11-08 PROBLEM — M48.062 NEUROGENIC CLAUDICATION DUE TO LUMBAR SPINAL STENOSIS: Status: ACTIVE | Noted: 2023-11-08

## 2023-11-08 PROBLEM — M54.2 CERVICALGIA: Status: ACTIVE | Noted: 2023-11-08

## 2023-11-10 ENCOUNTER — OFFICE VISIT (OUTPATIENT)
Dept: NEPHROLOGY | Facility: CLINIC | Age: 58
End: 2023-11-10
Payer: COMMERCIAL

## 2023-11-10 VITALS
BODY MASS INDEX: 39.56 KG/M2 | SYSTOLIC BLOOD PRESSURE: 128 MMHG | WEIGHT: 261 LBS | HEART RATE: 72 BPM | DIASTOLIC BLOOD PRESSURE: 85 MMHG | HEIGHT: 68 IN | TEMPERATURE: 97.9 F

## 2023-11-10 DIAGNOSIS — E78.49 OTHER HYPERLIPIDEMIA: ICD-10-CM

## 2023-11-10 DIAGNOSIS — N18.4 CHRONIC KIDNEY DISEASE, STAGE 4 (SEVERE) (MULTI): Primary | ICD-10-CM

## 2023-11-10 DIAGNOSIS — N17.9 AKI (ACUTE KIDNEY INJURY) (CMS-HCC): ICD-10-CM

## 2023-11-10 DIAGNOSIS — M32.10 SYSTEMIC LUPUS ERYTHEMATOSUS, ORGAN OR SYSTEM INVOLVEMENT UNSPECIFIED (MULTI): ICD-10-CM

## 2023-11-10 DIAGNOSIS — I10 HYPERTENSION, UNSPECIFIED TYPE: ICD-10-CM

## 2023-11-10 PROCEDURE — 3074F SYST BP LT 130 MM HG: CPT | Performed by: NURSE PRACTITIONER

## 2023-11-10 PROCEDURE — 3079F DIAST BP 80-89 MM HG: CPT | Performed by: NURSE PRACTITIONER

## 2023-11-10 PROCEDURE — 1036F TOBACCO NON-USER: CPT | Performed by: NURSE PRACTITIONER

## 2023-11-10 PROCEDURE — 3008F BODY MASS INDEX DOCD: CPT | Performed by: NURSE PRACTITIONER

## 2023-11-10 PROCEDURE — 99213 OFFICE O/P EST LOW 20 MIN: CPT | Performed by: NURSE PRACTITIONER

## 2023-11-15 ENCOUNTER — HOSPITAL ENCOUNTER (OUTPATIENT)
Dept: RESPIRATORY THERAPY | Facility: HOSPITAL | Age: 58
Discharge: HOME | End: 2023-11-15
Payer: COMMERCIAL

## 2023-11-15 DIAGNOSIS — J42 CHRONIC BRONCHITIS, UNSPECIFIED CHRONIC BRONCHITIS TYPE (MULTI): ICD-10-CM

## 2023-11-15 LAB
MGC ASCENT PFT - FEV1 - POST: 2.02
MGC ASCENT PFT - FEV1 - PRE: 1.66
MGC ASCENT PFT - FEV1 - PREDICTED: 3.14
MGC ASCENT PFT - FVC - POST: 3.44
MGC ASCENT PFT - FVC - PRE: 3.29
MGC ASCENT PFT - FVC - PREDICTED: 4.01

## 2023-11-15 PROCEDURE — 94060 EVALUATION OF WHEEZING: CPT

## 2023-11-15 PROCEDURE — 94726 PLETHYSMOGRAPHY LUNG VOLUMES: CPT

## 2023-11-15 PROCEDURE — 94618 PULMONARY STRESS TESTING: CPT

## 2023-11-15 PROCEDURE — 94726 PLETHYSMOGRAPHY LUNG VOLUMES: CPT | Performed by: INTERNAL MEDICINE

## 2023-11-15 PROCEDURE — 94060 EVALUATION OF WHEEZING: CPT | Performed by: INTERNAL MEDICINE

## 2023-11-15 PROCEDURE — 94729 DIFFUSING CAPACITY: CPT | Performed by: INTERNAL MEDICINE

## 2023-11-15 PROCEDURE — 94729 DIFFUSING CAPACITY: CPT

## 2023-11-15 PROCEDURE — 94618 PULMONARY STRESS TESTING: CPT | Performed by: INTERNAL MEDICINE

## 2023-11-28 ENCOUNTER — APPOINTMENT (OUTPATIENT)
Dept: PAIN MEDICINE | Facility: HOSPITAL | Age: 58
End: 2023-11-28
Payer: COMMERCIAL

## 2023-11-28 ASSESSMENT — ENCOUNTER SYMPTOMS
GASTROINTESTINAL NEGATIVE: 1
PSYCHIATRIC NEGATIVE: 1
CARDIOVASCULAR NEGATIVE: 1
ENDOCRINE NEGATIVE: 1
ARTHRALGIAS: 1
RESPIRATORY NEGATIVE: 1
EYES NEGATIVE: 1
CONSTITUTIONAL NEGATIVE: 1
HEMATOLOGIC/LYMPHATIC NEGATIVE: 1
NEUROLOGICAL NEGATIVE: 1

## 2023-11-28 NOTE — PROGRESS NOTES
History Of Present Illness  Allen Vela is a 58 y.o. 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     Past Medical History  As above.    Surgical History  He has no past surgical history on file.     Social History  He reports that he has quit smoking. His smoking use included cigarettes. He has a 15.00 pack-year smoking history. He has never used smokeless tobacco. He reports that he does not drink alcohol and does not use drugs.    Family History  Family History   Problem Relation Name Age of Onset    COPD Mother      Heart failure Mother      Heart failure Father      Diabetes Maternal Grandmother          Allergies  Montelukast and Ace inhibitors    Review of Systems   Constitutional: Negative.    HENT: Negative.     Eyes: Negative.    Respiratory: Negative.     Cardiovascular: Negative.    Gastrointestinal: Negative.    Endocrine: Negative.    Genitourinary: Negative.    Musculoskeletal:  Positive for arthralgias.   Skin: Negative.    Neurological: Negative.    Hematological: Negative.    Psychiatric/Behavioral: Negative.          Physical Exam  Constitutional:       Appearance: Normal appearance.   HENT:      Head: Normocephalic and atraumatic.      Mouth/Throat:      Mouth: Mucous membranes are moist.   Cardiovascular:      Rate and Rhythm: Normal rate and regular rhythm.      Pulses: Normal pulses.      Heart sounds: Normal heart sounds.   Pulmonary:      Effort: Pulmonary effort is normal.      Breath sounds: Normal breath sounds.   Musculoskeletal:      Comments: Chronic joint pain   Skin:     General: Skin is warm and dry.   Neurological:      General: No focal deficit present.      Mental Status: He is alert and oriented to person, place, and time.   Psychiatric:         Mood and Affect: Mood normal.         Behavior: Behavior normal.          "Thought Content: Thought content normal.         Judgment: Judgment normal.          Last Recorded Vitals  Blood pressure 128/85, pulse 72, temperature 36.6 °C (97.9 °F), temperature source Temporal, height 1.727 m (5' 8\"), weight 118 kg (261 lb).    Relevant Results  Recent Results (from the past 5040 hour(s))   CBC and Auto Differential    Collection Time: 06/12/23  3:55 PM   Result Value Ref Range    WBC 4.7 4.4 - 11.3 x10E9/L    nRBC 0.0 0.0 - 0.0 /100 WBC    RBC 3.81 (L) 4.50 - 5.90 x10E12/L    Hemoglobin 11.9 (L) 13.5 - 17.5 g/dL    Hematocrit 35.5 (L) 41.0 - 52.0 %    MCV 93 80 - 100 fL    MCHC 33.5 32.0 - 36.0 g/dL    Platelets 293 150 - 450 x10E9/L    RDW 17.1 (H) 11.5 - 14.5 %    Neutrophils % 59.0 40.0 - 80.0 %    Immature Granulocytes %, Automated 0.2 0.0 - 0.9 %    Lymphocytes % 21.2 13.0 - 44.0 %    Monocytes % 15.7 2.0 - 10.0 %    Eosinophils % 2.8 0.0 - 6.0 %    Basophils % 1.1 0.0 - 2.0 %    Neutrophils Absolute 2.75 1.20 - 7.70 x10E9/L    Lymphocytes Absolute 0.99 (L) 1.20 - 4.80 x10E9/L    Monocytes Absolute 0.73 0.10 - 1.00 x10E9/L    Eosinophils Absolute 0.13 0.00 - 0.70 x10E9/L    Basophils Absolute 0.05 0.00 - 0.10 x10E9/L   Comprehensive Metabolic Panel    Collection Time: 06/12/23  3:55 PM   Result Value Ref Range    Glucose 80 74 - 99 mg/dL    Sodium 132 (L) 136 - 145 mmol/L    Potassium 3.3 (L) 3.5 - 5.3 mmol/L    Chloride 94 (L) 98 - 107 mmol/L    Bicarbonate 22 21 - 32 mmol/L    Anion Gap 19 10 - 20 mmol/L    Urea Nitrogen 65 (H) 6 - 23 mg/dL    Creatinine 5.05 (H) 0.50 - 1.30 mg/dL    GFR MALE 12 (A) >90 mL/min/1.73m2    Calcium 10.9 (H) 8.6 - 10.6 mg/dL    Albumin 4.6 3.4 - 5.0 g/dL    Alkaline Phosphatase 87 33 - 120 U/L    Total Protein 7.9 6.4 - 8.2 g/dL    AST 33 9 - 39 U/L    Total Bilirubin 1.4 (H) 0.0 - 1.2 mg/dL    ALT (SGPT) 48 10 - 52 U/L   Lipid Panel    Collection Time: 06/12/23  3:55 PM   Result Value Ref Range    Cholesterol 89 0 - 199 mg/dL    HDL 35.3 (A) mg/dL    " Cholesterol/HDL Ratio 2.5     LDL 37 0 - 99 mg/dL    VLDL 17 0 - 40 mg/dL    Triglycerides 85 0 - 149 mg/dL   Prostate Specific Antigen    Collection Time: 06/12/23  3:55 PM   Result Value Ref Range    PSA 0.87 0.00 - 4.00 ng/mL   Urinalysis with Reflex Microscopic    Collection Time: 06/12/23  3:55 PM   Result Value Ref Range    Color, Urine YELLOW STRAW,YELLOW    Appearance, Urine CLEAR CLEAR    Specific Gravity, Urine 1.008 1.005 - 1.035    pH, Urine 6.0 5.0 - 8.0    Protein, Urine NEGATIVE NEGATIVE mg/dL    Glucose, Urine NEGATIVE NEGATIVE mg/dL    Blood, Urine NEGATIVE NEGATIVE    Ketones, Urine NEGATIVE NEGATIVE mg/dL    Bilirubin, Urine NEGATIVE NEGATIVE    Urobilinogen, Urine <2.0 0.0 - 1.9 mg/dL    Nitrite, Urine NEGATIVE NEGATIVE    Leukocyte Esterase, Urine NEGATIVE NEGATIVE   Hemoglobin A1C    Collection Time: 06/12/23  3:55 PM   Result Value Ref Range    Hemoglobin A1C 5.6 %    Estimated Average Glucose 114 MG/DL   Parathyroid Hormone, Intact    Collection Time: 06/12/23  3:55 PM   Result Value Ref Range    PTH 99.8 (H) 18.5 - 88.0 pg/mL   Albumin , Urine Random    Collection Time: 06/12/23  3:55 PM   Result Value Ref Range    ALBUMIN (MG/L) IN URINE 30.2 Not Established mg/L    Albumin/Creatine Ratio 44.2 (H) 0.0 - 30.0 ug/mg crt    Creatinine, Urine 68.3 20.0 - 370.0 mg/dL   Basic Metabolic Panel    Collection Time: 06/27/23  1:06 PM   Result Value Ref Range    Glucose 82 74 - 99 mg/dL    Sodium 134 (L) 136 - 145 mmol/L    Potassium 3.9 3.5 - 5.3 mmol/L    Chloride 102 98 - 107 mmol/L    Bicarbonate 19 (L) 21 - 32 mmol/L    Anion Gap 17 10 - 20 mmol/L    Urea Nitrogen 74 (H) 6 - 23 mg/dL    Creatinine 4.19 (H) 0.50 - 1.30 mg/dL    GFR MALE 15 (A) >90 mL/min/1.73m2    Calcium 10.4 8.6 - 10.6 mg/dL   Calcium, Ionized    Collection Time: 07/05/23  1:36 PM   Result Value Ref Range    Calcium, Ion 1.25 1.10 - 1.33 mmol/L   Basic Metabolic Panel    Collection Time: 07/05/23  1:36 PM   Result Value Ref Range     Glucose 90 74 - 99 mg/dL    Sodium 141 136 - 145 mmol/L    Potassium 3.8 3.5 - 5.3 mmol/L    Chloride 111 (H) 98 - 107 mmol/L    Bicarbonate 21 21 - 32 mmol/L    Anion Gap 13 10 - 20 mmol/L    Urea Nitrogen 36 (H) 6 - 23 mg/dL    Creatinine 2.84 (H) 0.50 - 1.30 mg/dL    GFR MALE 25 (A) >90 mL/min/1.73m2    Calcium 9.1 8.6 - 10.6 mg/dL   Basic Metabolic Panel    Collection Time: 08/02/23  1:10 PM   Result Value Ref Range    Glucose 79 74 - 99 mg/dL    Sodium 139 136 - 145 mmol/L    Potassium 4.5 3.5 - 5.3 mmol/L    Chloride 103 98 - 107 mmol/L    Bicarbonate 26 21 - 32 mmol/L    Anion Gap 15 10 - 20 mmol/L    Urea Nitrogen 25 (H) 6 - 23 mg/dL    Creatinine 2.45 (H) 0.50 - 1.30 mg/dL    GFR MALE 29 (A) >90 mL/min/1.73m2    Calcium 9.5 8.6 - 10.6 mg/dL   Vitamin D, Total    Collection Time: 08/04/23  2:10 PM   Result Value Ref Range    Vitamin D, 25-Hydroxy 29 (A) ng/mL   Uric Acid    Collection Time: 08/04/23  2:10 PM   Result Value Ref Range    Uric Acid 7.2 4.0 - 7.5 mg/dL   Respiratory Allergy Profile IgE    Collection Time: 08/30/23  5:02 PM   Result Value Ref Range    Immunocap IgE 9.9 0.0 - 214.0 KU/L    Bermuda Grass IgE <0.10 <0.35 KU/L    Tay Grass IgE <0.10 <0.35 KU/L    Meadow Grass, [place] Blue IgE <0.10 <0.35 KU/L    [place] Grass IgE <0.10 <0.35 KU/L    Goosefoot, [place] IgE <0.10 <0.35 KU/L    Common Pigweed IgE <0.10 <0.35 KU/L    Common Ragweed IgE <0.10 <0.35 KU/L    White Daryn IgE <0.10 <0.35 KU/L    Common Silver Birch IgE <0.10 <0.35 KU/L    Box-Elder IgE <0.10 <0.35 KU/L    Mountain Juniper IgE <0.10 <0.35 KU/L    Roggen IgE <0.10 <0.35 KU/L    Elm IgE <0.10 <0.35 KU/L    [place]ham IgE <0.10 <0.35 KU/L    Oak IgE <0.10 <0.35 KU/L    Pecan, [place] IgE <0.10 <0.35 KU/L    Maple [place] Iroquois, [place] IgE <0.10 <0.35 KU/L    [place] Tree IgE <0.10 <0.35 KU/L    Prickly Saltwort/Russian Thistle IgE <0.10 <0.35 KU/L    Sheep Sorrel IgE <0.10 <0.35 KU/L    Cat Dander IgE <0.10  <0.35 KU/L    Dog Dander IgE <0.10 <0.35 KU/L    Alternaria Alternata IgE <0.10 <0.35 KU/L    Aspergillus Fumigatus IgE <0.10 <0.35 KU/L    Cladosporium Herbarum IgE <0.10 <0.35 KU/L    Penicillium Chrysogenum (P. notatum) IgE <0.10 <0.35 KU/L    Plantain IgE <0.10 <0.35 KU/L    Dust Mite (D. farinae) IgE <0.10 <0.35 KU/L    Dust Mite (D. pteronyssinus) IgE <0.10 <0.35 KU/L    Fijian Cockroach IgE <0.10 <0.35 KU/L    Immunocap Interpretation SEE COMMENT    Sedimentation Rate    Collection Time: 10/20/23  5:04 PM   Result Value Ref Range    Sedimentation Rate 22 (H) 0 - 20 mm/h   C-Reactive Protein    Collection Time: 10/20/23  5:04 PM   Result Value Ref Range    C-Reactive Protein 0.40 <1.00 mg/dL   Comprehensive Metabolic Panel    Collection Time: 10/20/23  5:04 PM   Result Value Ref Range    Glucose 82 74 - 99 mg/dL    Sodium 138 136 - 145 mmol/L    Potassium 4.2 3.5 - 5.3 mmol/L    Chloride 102 98 - 107 mmol/L    Bicarbonate 23 21 - 32 mmol/L    Anion Gap 17 10 - 20 mmol/L    Urea Nitrogen 57 (H) 6 - 23 mg/dL    Creatinine 3.93 (H) 0.50 - 1.30 mg/dL    eGFR 17 (L) >60 mL/min/1.73m*2    Calcium 10.0 8.6 - 10.6 mg/dL    Albumin 4.7 3.4 - 5.0 g/dL    Alkaline Phosphatase 76 33 - 136 U/L    Total Protein 7.3 6.4 - 8.2 g/dL    AST 20 9 - 39 U/L    Bilirubin, Total 0.9 0.0 - 1.2 mg/dL    ALT 25 10 - 52 U/L   CBC    Collection Time: 10/20/23  5:04 PM   Result Value Ref Range    WBC 4.7 4.4 - 11.3 x10*3/uL    nRBC      RBC 3.69 (L) 4.50 - 5.90 x10*6/uL    Hemoglobin 12.4 (L) 13.5 - 17.5 g/dL    Hematocrit 38.2 (L) 41.0 - 52.0 %     (H) 80 - 100 fL    MCH 33.6 26.0 - 34.0 pg    MCHC 32.5 32.0 - 36.0 g/dL    RDW 14.1 11.5 - 14.5 %    Platelets 223 150 - 450 x10*3/uL    MPV 10.4 7.5 - 11.5 fL   Protein, Urine Random    Collection Time: 10/20/23  5:04 PM   Result Value Ref Range    Total Protein, Urine Random 6 5 - 25 mg/dL    Creatinine, Urine Random 59.7 20.0 - 370.0 mg/dL    T. Protein/Creatinine Ratio 0.10 0.00 -  0.17 mg/mg Creat   C3 Complement    Collection Time: 10/20/23  5:04 PM   Result Value Ref Range    C3 Complement 161 87 - 200 mg/dL   DOMINIQUE with Reflex to ALHAJI    Collection Time: 10/20/23  5:04 PM   Result Value Ref Range    DOMINIQUE Positive (A) Negative    DOMINIQUE Pattern Speckled     DOMINIQUE Titer 1:1280    Lipid Panel    Collection Time: 10/20/23  5:04 PM   Result Value Ref Range    Cholesterol 121 0 - 199 mg/dL    HDL-Cholesterol 47.2 mg/dL    Cholesterol/HDL Ratio 2.6     LDL Calculated 56 <=99 mg/dL    VLDL 18 0 - 40 mg/dL    Triglycerides 88 0 - 149 mg/dL    Non HDL Cholesterol 74 0 - 149 mg/dL   Parathyroid Hormone, Intact    Collection Time: 10/20/23  5:04 PM   Result Value Ref Range    Parathyroid Hormone, Intact 112.6 (H) 18.5 - 88.0 pg/mL   ALHAJI Panel    Collection Time: 10/20/23  5:04 PM   Result Value Ref Range    Anti-SM <0.2 <1.0 AI    Anti-RNP 6.7 (H) <1.0 AI    Anti-SM/RNP >8.0 (H) <1.0 AI    Anti-SSA <0.2 <1.0 AI    Anti-SSB <0.2 <1.0 AI    Anti-SCL-70 <0.2 <1.0 AI    Anti-GARY-1 IgG <0.2 <1.0 AI    Anti-Chromatin 6.3 (H) <1.0 AI    Anti-Centromere <0.2 <1.0 AI    ANTI-RIBOSOMAL P <0.2 <1.0 AI    Anti-DNA (DS) 6.0 (H) <5.0 IU/mL   Pulmonary function test    Collection Time: 11/15/23  5:04 PM   Result Value Ref Range    FVC - Predicted 4.01     FEV1 - Predicted 3.14     FVC - PRE 3.29     FEV1 - Pre 1.66     FVC - Post 3.44     FEV1 - Post 2.02          Assessment/Plan   58 y.o. 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 ELIER.    # ?? ELIER: serum creatinine (sCr) was up to 3.93 mg/dL with eGFR 17 ml/min/1.73m2. Baseline sCr 1.8 - 2.8 mg/dL. Unknown etiology. Of note, patient continued to use NSAIDs heavily.     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 Likely d/t hypertensive nephropathy, lupus-associated nephropathy, and  analgesic nephropathy given hx of long-term excessive use of NSAIDs. sCr increased to 3.93 mg/dL (10/20/23). ?? ELIER vs rapid progression of CKD. Unknown etiology. Of note, patient continued to use NSAIDs heavily.     # HTN: well controlled with current regimen.    # HLD: on a Statin.    # Lupus: dx in . f/u with rheumatology.    Plan:  - Lab: repeat RFP in 3 weeks, in setting of discontinue NSAIDs use.   - Advised to consult pain medicine for chronic pain management so that no continuous NSAIDs use. Reportedly having an upcoming appointment with pain medicine.  - Continue to follow up with rheumatology for optimal lupus management.  - Discussed possible kidney biopsy, but patient deferred at this time.  - No changes with current medications.  - Reviewed strategies for preserving remaining kidney function includin) Avoidance of NSAIDs including Aleve (Naprosyn), Motrin (Ibuprofen), Mobic (Meloxicam), Celebrex (Celecoxib) and aspirin as well as PPI acid blocking medications such as Prilosec (omeprazole), Protonix (pantoprazole), and Nexium (esomeprazole), as well as other nephrotoxic agents.   2) Avoidance of tobacco or alcohol use.   3) Adequate hydration daily.   4) Blood pressure target 130/80 mmHg.   5) Daily dietary sodium intake less than 2 grams per day.    6) Maintain healthy lifestyle. Healthy diet and regular exercises.   7) Maintain ideal weight.  - FUV: in 3 months or sooner if concerns arise.     Patient verbalized understanding of above. He will not hesitate to contact Division of Nephrology at [phone] with concerns/questions.    I spent 25 minutes in the professional and overall care of this patient.      Izabela Lundberg, APRN-CNP

## 2023-12-05 ENCOUNTER — APPOINTMENT (OUTPATIENT)
Dept: RADIOLOGY | Facility: HOSPITAL | Age: 58
End: 2023-12-05
Payer: COMMERCIAL

## 2023-12-05 ENCOUNTER — HOSPITAL ENCOUNTER (EMERGENCY)
Facility: HOSPITAL | Age: 58
Discharge: HOME | End: 2023-12-06
Attending: EMERGENCY MEDICINE
Payer: COMMERCIAL

## 2023-12-05 DIAGNOSIS — U07.1 COVID: ICD-10-CM

## 2023-12-05 DIAGNOSIS — J45.901 EXACERBATION OF ASTHMA, UNSPECIFIED ASTHMA SEVERITY, UNSPECIFIED WHETHER PERSISTENT (HHS-HCC): Primary | ICD-10-CM

## 2023-12-05 PROBLEM — J44.9 COPD (CHRONIC OBSTRUCTIVE PULMONARY DISEASE) (MULTI): Status: ACTIVE | Noted: 2023-12-05

## 2023-12-05 PROBLEM — E78.49 OTHER HYPERLIPIDEMIA: Status: ACTIVE | Noted: 2018-10-21

## 2023-12-05 LAB
ALBUMIN SERPL BCP-MCNC: 4.4 G/DL (ref 3.4–5)
ALP SERPL-CCNC: 73 U/L (ref 33–136)
ALT SERPL W P-5'-P-CCNC: 20 U/L (ref 10–52)
ANION GAP SERPL CALC-SCNC: 15 MMOL/L (ref 10–20)
AST SERPL W P-5'-P-CCNC: 22 U/L (ref 9–39)
BASOPHILS # BLD AUTO: 0.04 X10*3/UL (ref 0–0.1)
BASOPHILS NFR BLD AUTO: 0.7 %
BILIRUB SERPL-MCNC: 0.8 MG/DL (ref 0–1.2)
BNP SERPL-MCNC: 57 PG/ML (ref 0–99)
BUN SERPL-MCNC: 17 MG/DL (ref 6–23)
CALCIUM SERPL-MCNC: 9.6 MG/DL (ref 8.6–10.3)
CARDIAC TROPONIN I PNL SERPL HS: 8 NG/L (ref 0–20)
CHLORIDE SERPL-SCNC: 104 MMOL/L (ref 98–107)
CO2 SERPL-SCNC: 23 MMOL/L (ref 21–32)
CREAT SERPL-MCNC: 2.14 MG/DL (ref 0.5–1.3)
D DIMER PPP FEU-MCNC: 908 NG/ML FEU
EOSINOPHIL # BLD AUTO: 0.07 X10*3/UL (ref 0–0.7)
EOSINOPHIL NFR BLD AUTO: 1.2 %
ERYTHROCYTE [DISTWIDTH] IN BLOOD BY AUTOMATED COUNT: 15 % (ref 11.5–14.5)
FLUAV RNA RESP QL NAA+PROBE: NOT DETECTED
FLUBV RNA RESP QL NAA+PROBE: NOT DETECTED
GFR SERPL CREATININE-BSD FRML MDRD: 35 ML/MIN/1.73M*2
GLUCOSE SERPL-MCNC: 82 MG/DL (ref 74–99)
HCT VFR BLD AUTO: 39.8 % (ref 41–52)
HGB BLD-MCNC: 12.8 G/DL (ref 13.5–17.5)
IMM GRANULOCYTES # BLD AUTO: 0.01 X10*3/UL (ref 0–0.7)
IMM GRANULOCYTES NFR BLD AUTO: 0.2 % (ref 0–0.9)
INR PPP: 1.2 (ref 0.9–1.1)
LYMPHOCYTES # BLD AUTO: 0.82 X10*3/UL (ref 1.2–4.8)
LYMPHOCYTES NFR BLD AUTO: 13.5 %
MCH RBC QN AUTO: 33.2 PG (ref 26–34)
MCHC RBC AUTO-ENTMCNC: 32.2 G/DL (ref 32–36)
MCV RBC AUTO: 103 FL (ref 80–100)
MONOCYTES # BLD AUTO: 0.88 X10*3/UL (ref 0.1–1)
MONOCYTES NFR BLD AUTO: 14.5 %
NEUTROPHILS # BLD AUTO: 4.25 X10*3/UL (ref 1.2–7.7)
NEUTROPHILS NFR BLD AUTO: 69.9 %
NRBC BLD-RTO: 0 /100 WBCS (ref 0–0)
PLATELET # BLD AUTO: 262 X10*3/UL (ref 150–450)
POTASSIUM SERPL-SCNC: 4 MMOL/L (ref 3.5–5.3)
PROT SERPL-MCNC: 7.9 G/DL (ref 6.4–8.2)
PROTHROMBIN TIME: 13.4 SECONDS (ref 9.8–12.8)
RBC # BLD AUTO: 3.86 X10*6/UL (ref 4.5–5.9)
SARS-COV-2 RNA RESP QL NAA+PROBE: DETECTED
SODIUM SERPL-SCNC: 138 MMOL/L (ref 136–145)
WBC # BLD AUTO: 6.1 X10*3/UL (ref 4.4–11.3)

## 2023-12-05 PROCEDURE — 83880 ASSAY OF NATRIURETIC PEPTIDE: CPT

## 2023-12-05 PROCEDURE — 85379 FIBRIN DEGRADATION QUANT: CPT | Performed by: EMERGENCY MEDICINE

## 2023-12-05 PROCEDURE — 36415 COLL VENOUS BLD VENIPUNCTURE: CPT | Performed by: EMERGENCY MEDICINE

## 2023-12-05 PROCEDURE — 87636 SARSCOV2 & INF A&B AMP PRB: CPT

## 2023-12-05 PROCEDURE — 2500000002 HC RX 250 W HCPCS SELF ADMINISTERED DRUGS (ALT 637 FOR MEDICARE OP, ALT 636 FOR OP/ED): Performed by: EMERGENCY MEDICINE

## 2023-12-05 PROCEDURE — 94640 AIRWAY INHALATION TREATMENT: CPT

## 2023-12-05 PROCEDURE — 93005 ELECTROCARDIOGRAM TRACING: CPT

## 2023-12-05 PROCEDURE — 36415 COLL VENOUS BLD VENIPUNCTURE: CPT

## 2023-12-05 PROCEDURE — 78830 RP LOCLZJ TUM SPECT W/CT 1: CPT | Performed by: STUDENT IN AN ORGANIZED HEALTH CARE EDUCATION/TRAINING PROGRAM

## 2023-12-05 PROCEDURE — 71046 X-RAY EXAM CHEST 2 VIEWS: CPT | Performed by: RADIOLOGY

## 2023-12-05 PROCEDURE — 84075 ASSAY ALKALINE PHOSPHATASE: CPT

## 2023-12-05 PROCEDURE — 84484 ASSAY OF TROPONIN QUANT: CPT

## 2023-12-05 PROCEDURE — 96374 THER/PROPH/DIAG INJ IV PUSH: CPT | Mod: 59

## 2023-12-05 PROCEDURE — 85025 COMPLETE CBC W/AUTO DIFF WBC: CPT

## 2023-12-05 PROCEDURE — 85610 PROTHROMBIN TIME: CPT | Performed by: EMERGENCY MEDICINE

## 2023-12-05 PROCEDURE — 78580 LUNG PERFUSION IMAGING: CPT

## 2023-12-05 PROCEDURE — 80053 COMPREHEN METABOLIC PANEL: CPT

## 2023-12-05 PROCEDURE — 2500000004 HC RX 250 GENERAL PHARMACY W/ HCPCS (ALT 636 FOR OP/ED)

## 2023-12-05 PROCEDURE — A9540 TC99M MAA: HCPCS | Performed by: EMERGENCY MEDICINE

## 2023-12-05 PROCEDURE — 99284 EMERGENCY DEPT VISIT MOD MDM: CPT | Performed by: EMERGENCY MEDICINE

## 2023-12-05 PROCEDURE — 3430000001 HC RX 343 DIAGNOSTIC RADIOPHARMACEUTICALS: Performed by: EMERGENCY MEDICINE

## 2023-12-05 PROCEDURE — 71046 X-RAY EXAM CHEST 2 VIEWS: CPT

## 2023-12-05 RX ORDER — IPRATROPIUM BROMIDE AND ALBUTEROL SULFATE 2.5; .5 MG/3ML; MG/3ML
3 SOLUTION RESPIRATORY (INHALATION)
Status: ACTIVE | OUTPATIENT
Start: 2023-12-05 | End: 2023-12-05

## 2023-12-05 RX ORDER — PREDNISONE 20 MG/1
40 TABLET ORAL DAILY
Qty: 8 TABLET | Refills: 0 | Status: SHIPPED | OUTPATIENT
Start: 2023-12-06 | End: 2023-12-10

## 2023-12-05 RX ORDER — AMLODIPINE BESYLATE 5 MG/1
5 TABLET ORAL DAILY
COMMUNITY

## 2023-12-05 RX ORDER — PREDNISONE 20 MG/1
60 TABLET ORAL ONCE
Status: COMPLETED | OUTPATIENT
Start: 2023-12-05 | End: 2023-12-05

## 2023-12-05 RX ORDER — ALBUTEROL SULFATE 90 UG/1
4 AEROSOL, METERED RESPIRATORY (INHALATION) ONCE
Status: COMPLETED | OUTPATIENT
Start: 2023-12-05 | End: 2023-12-05

## 2023-12-05 RX ORDER — IPRATROPIUM BROMIDE AND ALBUTEROL SULFATE 2.5; .5 MG/3ML; MG/3ML
3 SOLUTION RESPIRATORY (INHALATION)
Status: DISCONTINUED | OUTPATIENT
Start: 2023-12-05 | End: 2023-12-06 | Stop reason: HOSPADM

## 2023-12-05 RX ADMIN — PREDNISONE 60 MG: 20 TABLET ORAL at 18:14

## 2023-12-05 RX ADMIN — KIT FOR THE PREPARATION OF TECHNETIUM TC 99M ALBUMIN AGGREGATED 4.4 MILLICURIE: 2.5 INJECTION, POWDER, FOR SOLUTION INTRAVENOUS at 19:22

## 2023-12-05 RX ADMIN — IPRATROPIUM BROMIDE AND ALBUTEROL SULFATE 3 ML: 2.5; .5 SOLUTION RESPIRATORY (INHALATION) at 23:43

## 2023-12-05 RX ADMIN — ALBUTEROL SULFATE 4 PUFF: 90 AEROSOL, METERED RESPIRATORY (INHALATION) at 22:00

## 2023-12-05 ASSESSMENT — PAIN DESCRIPTION - LOCATION: LOCATION: HEAD

## 2023-12-05 ASSESSMENT — COLUMBIA-SUICIDE SEVERITY RATING SCALE - C-SSRS
2. HAVE YOU ACTUALLY HAD ANY THOUGHTS OF KILLING YOURSELF?: NO
1. IN THE PAST MONTH, HAVE YOU WISHED YOU WERE DEAD OR WISHED YOU COULD GO TO SLEEP AND NOT WAKE UP?: NO
6. HAVE YOU EVER DONE ANYTHING, STARTED TO DO ANYTHING, OR PREPARED TO DO ANYTHING TO END YOUR LIFE?: NO

## 2023-12-05 ASSESSMENT — PAIN DESCRIPTION - PAIN TYPE: TYPE: ACUTE PAIN

## 2023-12-05 ASSESSMENT — PAIN SCALES - GENERAL: PAINLEVEL_OUTOF10: 5 - MODERATE PAIN

## 2023-12-05 ASSESSMENT — PAIN - FUNCTIONAL ASSESSMENT: PAIN_FUNCTIONAL_ASSESSMENT: 0-10

## 2023-12-05 NOTE — PROGRESS NOTES
Pharmacy Medication History Review    Allen Vela is a 58 y.o. male admitted for No Principal Problem: There is no principal problem currently on the Problem List. Please update the Problem List and refresh.. Pharmacy reviewed the patient's [id number] medications and allergies for accuracy.    The list below reflectives the updated PTA list. Please review each medication in order reconciliation for additional clarification and justification.  Prior to Admission Medications   Prescriptions Last Dose Informant Patient Reported? Taking?   DULoxetine (Cymbalta) 60 mg DR capsule 12/5/2023 Self Yes No   Sig: Take 1 capsule (60 mg) by mouth once daily.   Ventolin HFA 90 mcg/actuation inhaler 12/5/2023 Self Yes No   Sig: Inhale 2 puffs every 6 hours if needed.   allopurinol (Zyloprim) 100 mg tablet 12/5/2023 Self Yes No   Sig: Take 1 tablet (100 mg) by mouth once daily.   amLODIPine (Norvasc) 5 mg tablet  Self Yes No   Sig: Take 1 tablet (5 mg) by mouth once daily.   atenolol (Tenormin) 100 mg tablet 12/5/2023 Self Yes No   Sig: Take 1 tablet (100 mg) by mouth once daily.   atorvastatin (Lipitor) 20 mg tablet 12/5/2023 Self Yes No   Sig: Take 1 tablet (20 mg) by mouth once daily.   buPROPion XL (Wellbutrin XL) 150 mg 24 hr tablet 12/5/2023 Self Yes No   cholecalciferol (Vitamin D-3) 50 mcg (2,000 unit) capsule 12/5/2023 Self Yes No   Sig: Take 1 capsule (50 mcg) by mouth once daily.   esketamine (Spravato) 2 sprays/28 mg per device spray,non-aerosol nasal spray 12/5/2023 Self Yes No   Sig: Administer 42 mg into each nostril 2 times a week.   furosemide (Lasix) 20 mg tablet 12/5/2023 Self Yes No   Sig: Take 1 tablet (20 mg) by mouth once daily.   hydrOXYzine HCL (Atarax) 50 mg tablet 12/5/2023 Self Yes No   hydroxychloroquine (Plaquenil) 200 mg tablet 12/5/2023 Self Yes No   Sig: Take 1 tablet (200 mg) by mouth once daily.   pantoprazole (ProtoNix) 40 mg EC tablet 12/5/2023 Self Yes No   spironolactone (Aldactone)  25 mg tablet 12/5/2023 Self Yes No   tiotropium-olodateroL (Stiolto Respimat) 2.5-2.5 mcg/actuation mist inhaler 12/5/2023 Self No No   Sig: Inhale 2 Inhalations once daily.   traMADol (Ultram) 50 mg tablet 12/5/2023 Self Yes No   Sig: Take 1 tablet (50 mg) by mouth 4 times a day as needed.      Facility-Administered Medications: None           The list below reflectives the updated allergy list. Please review each documented allergy for additional clarification and justification.  Allergies  Reviewed by Yvette Nick RN on 12/5/2023        Severity Reactions Comments    Montelukast High Shortness of breath Makes breathing worse    Ace Inhibitors Not Specified Swelling Pt reports he developed lip swelling when he was given accupril in the past            Below are additional concerns with the patient's PTA list.      Aaron Dumont CPhT

## 2023-12-05 NOTE — ED TRIAGE NOTES
TRIAGE NOTE   I saw the patient as the Clinician in Triage and performed a brief history and physical exam, established acuity, and ordered appropriate tests to develop basic plan of care. Patient will be seen by an MEME, resident and/or physician who will independently evaluate the patient. Please see subsequent provider notes for further details and disposition.     Brief HPI: In brief, Allen Vela is a 58 y.o. male that presents for shortness of breath.  Patient states he tested positive COVID 3 days ago and has had increasing cough, shortness of breath, and bodyaches since then.  He states that he has history of COPD.  States he ran out of his nebulizer at home.  Denies fever/chills, nausea/vomiting.  Denies chest pain.  No other symptoms or concerns at this time.    Focused Physical exam:   Diffuse expiratory wheezing.  Decreased aeration.  No retractions.    Plan/MDM:   Initiating CBC, CMP, troponins, BNP, EKG, chest x-ray, and COVID/flu swab.  Patient will be seen in the back to the ED for further evaluation and treatment.  I will not be following with this patient during his ED visit.  I did put in for DuoNebs and prednisone this time. Notified nursing staff that patient should be one of the next ones back for bed placement.     Please see subsequent provider note for further details and disposition

## 2023-12-05 NOTE — ED PROVIDER NOTES
History/Exam limitations: none.   Additional history was obtained from patient and past medical records.    HPI:    Allen Vela is a 58 y.o. male PMH COPD/asthma, CKD 3, depression, pulmonary hypertension presenting with shortness of breath in setting of recent COVID diagnosis.  Patient states that has been having gradual worsening shortness of breath over the last 3 days.  Tested positive for COVID at home 2 days ago.  No headache, vision changes.  Has some intermittent chest discomfort worsening with coughing.  Reports shortness of breath and wheezing as well.  No nausea, vomit, abdominal pain, diarrhea, dysuria.    Physical Exam:  ED Triage Vitals [12/05/23 1400]   Temp Heart Rate Resp BP   36.7 °C (98 °F) 93 18 (!) 147/103      SpO2 Temp Source Heart Rate Source Patient Position   99 % Temporal -- --      BP Location FiO2 (%)     -- --        GEN:      Alert, NAD  Eyes:       PERRL, EOMI  HENT:      NC/AT, OP clear, airway patent, MM, no JVD  CV:      RRR, no MRG, no LE pitting edema, 2+ radial and pedal pulses  PULM:     Diffuse end expiratory wheezing, no rhonchi, easy WOB, symmetric chest rise  ABD:      Soft, NT, ND, no masses, BS +  :       No CVA TTP  NEURO:   A&Ox3, no focal deficits    MSK:      FROM, no joint deformities or swelling, no e/o trauma  SKIN:       Warm and dry  PSYCH:    Appropriate mood and affect         MDM/ED Course:   Allen Vela is a 58 y.o. male PMH COPD/asthma, CKD 3, depression, pulmonary hypertension presenting with shortness of breath in setting of recent COVID diagnosis.  Vitals markable for hypertension as above.  Exam as documented above.  Presentation overall most concerning for COPD exacerbation in setting of COVID causing worsening shortness of breath.  Differential also includes ACS, volume overload, PE.  Differential can symptomatic anemia.  Differential includes bacterial pneumonia.  Chest x-ray obtained and reassuring with no clear pneumonia.  CBC reviewed no  leukocytosis, hemoglobin 12.8.  Troponin negative, unlikely ACS.  BNP normal at 57.  COVID-positive.  Chemistry with creatinine 2.14 improved from most recent baseline.  D-dimer elevated at 908.  VQ scan obtained.  Patient had received p.o. prednisone in triage area, ipratropium albuterol nebulizer treatments have been ordered.  Reassessed patient he states he felt improved after medications however I then became aware of patient having not received any aerosols due to him being COVID-positive.  He was still having significant wheezing.  Ambulatory oxygen saturation 91.  Patient's baseline O2 sat is normal however he does have a COPD history.  Was notified by nursing staff that RT said patient has to have MDIs which are now ordered.  Patient care signed to my oncoming colleague at shift change pending MDIs and reassessment.  Patient overall appears nontoxic improved and reassuring oxygen saturations with ambulation, can likely be managed outpatient with p.o. steroids, continued albuterol treatments and home inhalers, isolation and symptomatic treatment of COVID.    EKG reviewed by me: 2:06 PM normal sinus rhythm, rate 90, normal axis, normal intervals, no STEMI, no acute ischemic changes.     Diagnoses as of 12/09/23 2348   Exacerbation of asthma, unspecified asthma severity, unspecified whether persistent   COVID         Chronic medical conditions affecting care listed in MDM. I independently reviewed imaging studies and agreed with radiology reads. I reviewed recent and relevant outside records including PCP notes, Prior discharge summaries, and prior radiology reports.    Procedure  Procedures    Diagnosis:   1.  COPD exacerbation  2.  COVID    Dispo: Signed out to oncoming provider at shift change in stable condition      Disclaimer: Portions of this note were dictated by speech recognition. An attempt at proof reading was made to minimize errors. Minor errors in transcription may be present.  Please call if  questions.     Joshua Coronado MD  12/09/23 4505

## 2023-12-06 VITALS
OXYGEN SATURATION: 100 % | DIASTOLIC BLOOD PRESSURE: 103 MMHG | WEIGHT: 260 LBS | HEART RATE: 101 BPM | TEMPERATURE: 98 F | SYSTOLIC BLOOD PRESSURE: 150 MMHG | HEIGHT: 68 IN | RESPIRATION RATE: 16 BRPM | BODY MASS INDEX: 39.4 KG/M2

## 2023-12-06 NOTE — DISCHARGE INSTRUCTIONS
You were seen emergency room today for evaluation of shortness of breath and wheezing in the setting of COVID.  Your labs and imaging are reassuring.  Your oxygen level was reassuring with walking.  Please follow-up with her primary care provider on this emergency visit.  Please return if you have worsening symptoms include not limited to chest pain, worsened shortness of breath, weakness, numbness, or if you have any other concerns.

## 2023-12-06 NOTE — PROGRESS NOTES
Patient is a 4-year-old male with a past medical history significant for COPD, asthma who presented to the emergency room with shortness of breath and wheezing.  He was found to be COVID-positive.  He was initially seen and evaluated by Dr. Coronado and signed out to me pending reevaluation after nebulizer treatments, steroids.  Initially he was 91% on ambulation without treatment.  On my evaluation patient has yet to receive his treatments as they have been delayed by respiratory team owing to COVID status and the lack of room.  Patient is now on room 21 and, despite doing albuterol pump, still feels short of breath and is diffusely wheezy.  He is given DuoNebs in the room and reevaluation he feels much improved and ready to go home.  His lungs now sound clear and he is ambulatory with a minimum oxygen saturation of 95% on room air.  It Dr. Coronado has already ordered his a steroids to his pharmacy and patient is instructed to pick them up.  He also has breathing treatments at home that he can continue with should he need to.  He will follow-up with his doctor this week and he is thoroughly educated on supportive care at home as well as signs and symptoms that should prompt immediate return to the emergency room.  Patient is discharged in stable condition.        Rachel Verma MD

## 2023-12-11 ENCOUNTER — APPOINTMENT (OUTPATIENT)
Dept: PAIN MEDICINE | Facility: HOSPITAL | Age: 58
End: 2023-12-11
Payer: COMMERCIAL

## 2023-12-15 DIAGNOSIS — N18.4 CHRONIC KIDNEY DISEASE, STAGE 4 (SEVERE) (MULTI): Primary | ICD-10-CM

## 2024-01-03 ENCOUNTER — APPOINTMENT (OUTPATIENT)
Dept: OPHTHALMOLOGY | Facility: CLINIC | Age: 59
End: 2024-01-03
Payer: COMMERCIAL

## 2024-01-08 ENCOUNTER — OFFICE VISIT (OUTPATIENT)
Dept: PAIN MEDICINE | Facility: HOSPITAL | Age: 59
End: 2024-01-08
Payer: COMMERCIAL

## 2024-01-08 DIAGNOSIS — M47.817 LUMBOSACRAL SPONDYLOSIS WITHOUT MYELOPATHY: ICD-10-CM

## 2024-01-08 DIAGNOSIS — M48.062 NEUROGENIC CLAUDICATION DUE TO LUMBAR SPINAL STENOSIS: Primary | ICD-10-CM

## 2024-01-08 DIAGNOSIS — M47.812 CERVICAL SPONDYLOSIS WITHOUT MYELOPATHY: ICD-10-CM

## 2024-01-08 PROCEDURE — 1036F TOBACCO NON-USER: CPT | Performed by: ANESTHESIOLOGY

## 2024-01-08 PROCEDURE — 99214 OFFICE O/P EST MOD 30 MIN: CPT | Performed by: ANESTHESIOLOGY

## 2024-01-08 PROCEDURE — 3008F BODY MASS INDEX DOCD: CPT | Performed by: ANESTHESIOLOGY

## 2024-01-08 PROCEDURE — 99204 OFFICE O/P NEW MOD 45 MIN: CPT | Performed by: ANESTHESIOLOGY

## 2024-01-08 ASSESSMENT — PAIN - FUNCTIONAL ASSESSMENT: PAIN_FUNCTIONAL_ASSESSMENT: 0-10

## 2024-01-08 ASSESSMENT — PAIN SCALES - GENERAL: PAINLEVEL_OUTOF10: 6

## 2024-01-08 NOTE — PROGRESS NOTES
History Of Present Illness  Allen Vela is a 58 y.o. male presenting as a new patient in pain clinic today for back and neck pain.  Patient says the neck pain is worse than the back pain.  Patient says that the neck pain is worse soon as he wakes up in the morning, and associate with stiffness and tightness.  He says the pain is exacerbated when he looks around he says the pain does not radiate past the shoulders.  He denies any hand weakness or numbness.  Patient also endorses low back pain that stays in the back and does not radiate on the legs.  He says the pain is worse when he is prolonged sitting and also standing.     Past Medical History  Past Medical History:   Diagnosis Date    COPD (chronic obstructive pulmonary disease) (CMS/[facility])     Depression     Gout     Hypertension     Torn rotator cuff     Ulcer of abdomen wall (CMS/[facility] located [facility])        Surgical History  No past surgical history on file.     Social History  He reports that he has quit smoking. His smoking use included cigarettes. He has a 15.00 pack-year smoking history. He has never used smokeless tobacco. He reports that he does not drink alcohol and does not use drugs.    Family History  Family History   Problem Relation Name Age of Onset    COPD Mother      Heart failure Mother      Heart failure Father      Diabetes Maternal Grandmother          Allergies  Montelukast and Ace inhibitors    Review of Systems   13 point ROS done and negative except for the above.   Physical Exam    PHYSICAL EXAM  Vitals signs reviewed  Constitutional:    General: Not in acute distress   Appearance: Normal appearance. Not ill-appearing.  HENT:   Head: Normocephalic and atraumatic  Eyes:   Conjunctiva/sclera normal  Cardiovascular:  No jugular venous distention bilaterally  No gross edema in lower extremities  Pulmonary:   Effort: No respiratory distress  Abdominal:  Abdomen appears nondistended  Musculoskeletal:  No pain to palpation in multiple body areas in the head  and neck, trunk, proximal bilateral upper and lower extremities in accordance with ACR criteria.  Tenderness to palpation of bilateral cervical paraspinal regions  Inability to raise the left arm behind the left head  Reduced internal/external rotation of left shoulder  Skin:   General: Skin is warm and dry  Neurological:  Mike was negative bilaterally  Spurling sign was negative bilaterally   strength was intact bilaterally  Psychiatric:    Mood and Affect: Mood normal    Behavior: Behavior normal    Last Recorded Vitals  There were no vitals taken for this visit.    Relevant Results        ASSESSMENT:  Assessment/Plan   Problem List Items Addressed This Visit             ICD-10-CM       Musculoskeletal and Injuries    Neurogenic claudication due to lumbar spinal stenosis - Primary M48.062    Relevant Orders    FL pain management TC     Medial Nerve Branch Block       Patient is a 58-year-old male presents as a new patient pain clinic for neck and back pain.  C-spine plain film reviewed, notable for multilevel spondylosis with pronounced loss of disc height at C3, C4, C5.  Given worsening pain in the morning that does not radiate past shoulders, will plan on left diagnostic medial branch nerve test block (MBB).  However given severe pain with prolonged sitting in the low back, concerning for discogenic/vertebrogenic pain.  Therefore will order both C-spine and L-spine MRI, as well as order L-spine MR (patient has previously completed 8 weeks of physical therapy for his neck and is currently active in physician directed exercises at home).  CKD, GFR 35.    PLAN:  - Order physical therapy for low back  - Schedule for left-sided cervical MBB test block #1  - Order C-spine and L-spine MRI    The plan was discussed with the patient and they were invited to contact us back anytime with any questions or concerns and follow-up with us in the office as needed.    Ned Mckeon MD

## 2024-01-10 ENCOUNTER — EVALUATION (OUTPATIENT)
Dept: PHYSICAL THERAPY | Facility: CLINIC | Age: 59
End: 2024-01-10
Payer: COMMERCIAL

## 2024-01-10 DIAGNOSIS — M54.50 CHRONIC BILATERAL LOW BACK PAIN WITHOUT SCIATICA: Primary | ICD-10-CM

## 2024-01-10 DIAGNOSIS — M48.062 NEUROGENIC CLAUDICATION DUE TO LUMBAR SPINAL STENOSIS: ICD-10-CM

## 2024-01-10 DIAGNOSIS — G89.29 CHRONIC NECK PAIN: ICD-10-CM

## 2024-01-10 DIAGNOSIS — M54.2 CHRONIC NECK PAIN: ICD-10-CM

## 2024-01-10 DIAGNOSIS — G89.29 CHRONIC BILATERAL LOW BACK PAIN WITHOUT SCIATICA: Primary | ICD-10-CM

## 2024-01-10 PROCEDURE — 97162 PT EVAL MOD COMPLEX 30 MIN: CPT | Mod: GP | Performed by: PHYSICAL THERAPIST

## 2024-01-10 ASSESSMENT — ENCOUNTER SYMPTOMS
LOSS OF SENSATION IN FEET: 0
OCCASIONAL FEELINGS OF UNSTEADINESS: 0
DEPRESSION: 1

## 2024-01-10 NOTE — PROGRESS NOTES
"Physical Therapy    Physical Therapy Evaluation and Treatment      Patient Name: Allen Vela  MRN: [id number]  Today's Date: 1/10/2024  Time Calculation  Start Time: 1445  Stop Time: 1545  Time Calculation (min): 60 min  Caresource - needs auth  Assessment:  Pt presents with chronic neck and low back pain. His attitude and belief towards his pain is poor so I provided education on chronic pain syndrome and the neuroscience of pain. He will benefit from skilled PT to reduce neck and back pain, improve ROM and flexibility, increase hip and core strength, and maximize functional abilities/tolerance to improve quality of life.     Plan:  OP PT Plan  Treatment/Interventions: Dry needling, Education/ Instruction, Hot pack, Manual therapy, Mechanical traction, Neuromuscular re-education, Therapeutic activities, Therapeutic exercises  PT Plan: Skilled PT  PT Frequency: 1 time per week  Duration: 15  Onset Date: 11/07/23  Rehab Potential: Fair  Plan of Care Agreement: Patient    Current Problem:   1. Chronic bilateral low back pain without sciatica  Follow Up In Physical Therapy      2. Neurogenic claudication due to lumbar spinal stenosis  Referral to Physical Therapy      3. Chronic neck pain  Follow Up In Physical Therapy          Subjective    General:  General  Reason for Referral: neck pain, low back pain  Referred By: Damaris Cano CNP  Preferred Learning Style: visual, writtenChief Physical Therapy complaint is low back pain worse with standing, and neck pain.   \"I didn't really want to do therapy, but it is required\". Had cervical MRI years ago at [facility] that showed bone spur pressing on a nerve, per patient. Had 3 ablations - last 2 did not help. Scheduled for cervical injections and cervical and lumbar MRIs in 2 weeks. Also has left RTC tear.  Tried a few sessions of therapy for left shoulder in November 2023 but the exercises were too painful.     Occupation: Applying for disability. Has evaluation for " disability on 1/22/24. 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   PMH: stage 4 kidney disease, depression, COPD, HTN, AMRITA,   Precautions:  Precautions  STEADI Fall Risk Score (The score of 4 or more indicates an increased risk of falling): 5  Precautions Comment: none  Vital Signs:     Pain:  Lumbar pain is 4/10 currently, 9/10 at worst.  Worse with lifting, standing > 10 minutes.   Relief with NSAIDs.   Sacrum begins to hurt after sitting for > 45 minutes. Excruciating sacral pain upon standing that eases with time.   Low back pain increases with standing > 10 minutes. Max standing time 30 minutes  Denies radicular pain or N/T except some left anterior thigh discomfort that is chronic  Neck pain 5/10 axial and left side into left superior left shoulder. 8/10 at worst. Relief with lying down with head support. Worse with sitting and holding head up for longer periods.   Takes Tylenol and Tramadol daily.     Home Living:  Likes to read. Lives with his father. Staying on his fathers couch.   Prior Level of Function:  Independent without significant disabilities other than limitations from pain    Objective   Cognition:   Intact.   General Assessments:  Posture Comment: Good spinal alignment and head alignment in sitting.    Cervical Spine  Cervical AROM  Cervical flexion: (80°): 41 axial to left pain  Cervical extension: (50°): 13 axial pain into t-spine  Cervical rotation right: (80°): 45 no pain  Cervical rotation left: (80°): 40 left shoulder pain  Cervical sidebend right: (45°): 21 left neck pain/tight  Cervical sidebend left: (45°): 37 no pain  Shoulder AROM   Right shoulder WNL. Left shoulder flexion and abduction limited by left RTC injury  Cervical Strength   4+/5 in all planes  Myotomes (MMT)   Intact except left shoulder RTC weakness.     DTR  R Biceps C5: (2+): WNL  L Biceps C5: (2+): WNL  Dermatomes     Special Tests  Alar ligament R: (Negative): negative  Alar ligament L: (Negative): negative  Spurling’s Test:  (Negative): negative  Cervical Distraction Test: (Negative): negative  Other: - Hoffmans    Lumbar Palpation/Joint Mobility Assessment   Lumbar P-A mobs: moderate hypomobility without pain L1-L5  No TTP of lumbar spine or hips  Lumbar AROM  Lumbar flexion: (60°): fingers to mid shin. increased axial to right L/S into right gluteals pain.  Lumbar extension (25°): 10 deg. slight axial to right L5 pain  Lumbar sidebend right (25°): 18 deg bilateral lower lumbar pain  Lumbar sidebend left (25°): 17 deg. axial lumbar pain  Hip AROM   WFL except Mild restriction in extension. Flexion limited by body morphology.   Lumbar Myotomes  R Hip Flex : 5  L Hip Flex (L2): (5/5): 5  R Knee Ext (L3): (5/5): 4  L Knee Ext (L3): (5/5) : 4  R Ankle DF (L4): (5/5): 5  L Ankle DF (L4): (5/5): 4+  R Ankle EV (S1): (5/5): 5  L Ankle EV (S1): (5/5): 5  Specific Lower Extremity MMT  R Iliopsoas: (5/5): 5  L Iliopsoas: (5/5): 5  R Gluteals (prone): (5/5): 4  L Gluteals (prone): (5/5): 4  R Hip External Rotation: (5/5): 5  L Hip External Rotation: (5/5): 5    Special Tests  Slump: (Negative): + left   No pain with prone on elbows or prone lying    Outcome Measures:  Other Measures  Oswestry Disablity Index (ALTAF): 69%     Treatments:  Performed supine LTR, supine passive hip ROM, prone on elbows, standing lumbar extension, seated hamstring stretch.   Provided HEP via instruction, practice, demo, and handouts.     EDUCATION:   Educated on chronic pain process, neuroscience of pain, centralization of pain    Goals:  Active       neck and low back pain       Oswestry       Start:  01/10/24    Expected End:  03/25/24       Improve oswestry to < 58%          lumbar AROM       Start:  01/10/24    Expected End:  03/25/24       Improve lumbar flexion and side flexion by 10% to improve function         Hip and core strength       Start:  01/10/24    Expected End:  03/25/24       Hip strength to 5/5 globally, improve core strength for greater  stabilization and reduced spinal stress         cervical AROM       Start:  01/10/24    Expected End:  03/25/24       Increase cervical flexion and extension by > 10 deg, rotation > 10 deg, right SB > 6 deg.          Patient Stated Goal 1       Start:  01/10/24    Expected End:  03/25/24       Increase sitting and standing time with less neck and low back pain

## 2024-01-18 ENCOUNTER — TRANSCRIBE ORDERS (OUTPATIENT)
Dept: SPORTS MEDICINE | Facility: CLINIC | Age: 59
End: 2024-01-18
Payer: COMMERCIAL

## 2024-01-18 DIAGNOSIS — N18.30 STAGE 3 CHRONIC KIDNEY DISEASE, UNSPECIFIED WHETHER STAGE 3A OR 3B CKD (MULTI): ICD-10-CM

## 2024-01-18 DIAGNOSIS — F32.A DEPRESSIVE TYPE PSYCHOSIS: ICD-10-CM

## 2024-01-18 DIAGNOSIS — I50.32 CHRONIC DIASTOLIC HEART FAILURE (MULTI): ICD-10-CM

## 2024-01-18 DIAGNOSIS — M32.9 SYSTEMIC LUPUS ERYTHEMATOSUS, UNSPECIFIED SLE TYPE, UNSPECIFIED ORGAN INVOLVEMENT STATUS (MULTI): ICD-10-CM

## 2024-01-18 DIAGNOSIS — J44.9: ICD-10-CM

## 2024-01-23 ENCOUNTER — APPOINTMENT (OUTPATIENT)
Dept: RADIOLOGY | Facility: HOSPITAL | Age: 59
End: 2024-01-23
Payer: COMMERCIAL

## 2024-01-24 ENCOUNTER — APPOINTMENT (OUTPATIENT)
Dept: RADIOLOGY | Facility: HOSPITAL | Age: 59
End: 2024-01-24
Payer: COMMERCIAL

## 2024-01-29 ENCOUNTER — TREATMENT (OUTPATIENT)
Dept: PHYSICAL THERAPY | Facility: CLINIC | Age: 59
End: 2024-01-29
Payer: COMMERCIAL

## 2024-01-29 DIAGNOSIS — M54.2 CHRONIC NECK PAIN: ICD-10-CM

## 2024-01-29 DIAGNOSIS — G89.29 CHRONIC NECK PAIN: ICD-10-CM

## 2024-01-29 DIAGNOSIS — G89.29 CHRONIC BILATERAL LOW BACK PAIN WITHOUT SCIATICA: ICD-10-CM

## 2024-01-29 DIAGNOSIS — M54.50 CHRONIC BILATERAL LOW BACK PAIN WITHOUT SCIATICA: ICD-10-CM

## 2024-01-29 PROCEDURE — 97110 THERAPEUTIC EXERCISES: CPT | Mod: GP | Performed by: PHYSICAL THERAPIST

## 2024-01-29 NOTE — PROGRESS NOTES
Physical Therapy    Physical Therapy Treatment    Patient Name: Allen Vela  MRN: [id number]  Today's Date: 1/29/2024  Time Calculation  Start Time: 1650  Stop Time: 1735  Time Calculation (min): 45 min  Visit #2  Caresource:  60 units approved from 1/10/24 to 4/10/24.     Assessment:  Left shoulder and neck pain increased to 4/10 after session which is not surprising given the fact that this was his first attempt at cervical exercises. It will likely take his soft tissues and joints a perioed of time to adjust to the new movements/stretches. His left shoulder AROM is very limited so we have to modify any left shoulder ROM. He will attempt supine AAROM shoulder flexion and determine if it is helping to improve ROM or aggravating the pain.   Plan:  Skilled PT treatment to address chronic neck and left shoulder pain, chronic lower back pain, decreased mobility and weakness.     Current Problem  1. Chronic bilateral low back pain without sciatica  Follow Up In Physical Therapy      2. Chronic neck pain  Follow Up In Physical Therapy          General  General  Reason for Referral: neck pain, low back pain  Referred By: Damaris Cano CNP    Subjective    Pt. Reports 3/10 neck and left shoulder pain. He hasn't been able to do his HEP since evaluation on 1/10/24 due to having the flu.   Precautions  Precautions  Precautions Comment: none    Objective   Treatments:  - Recumbent stepper with MHP to lumbosacral region for warm up and ROM. X 7 minutes  - seated trunk flexion with t-ball roll out. 5s x 10  - seated cervical sidebend stretch. 30s x 3 ea  - seated cervical retraction 3s x 15  - supine cervical rotation with overpressure. 5s x 10 ea  - supine chin tuck/DNF strengthening 3s x 15  - supine shoulder flexion AAROM with wand. 3s x 12  - hooklying LTR 5s x 10 ea    OP EDUCATION:  Issued new cervical HEP via practice, instructions and handouts.   Goals:  Active       neck and low back pain       Oswestry       Start:   01/10/24    Expected End:  03/25/24       Improve oswestry to < 58%          lumbar AROM       Start:  01/10/24    Expected End:  03/25/24       Improve lumbar flexion and side flexion by 10% to improve function         Hip and core strength       Start:  01/10/24    Expected End:  03/25/24       Hip strength to 5/5 globally, improve core strength for greater stabilization and reduced spinal stress         cervical AROM       Start:  01/10/24    Expected End:  03/25/24       Increase cervical flexion and extension by > 10 deg, rotation > 10 deg, right SB > 6 deg.          Patient Stated Goal 1       Start:  01/10/24    Expected End:  03/25/24       Increase sitting and standing time with less neck and low back pain

## 2024-01-30 ENCOUNTER — HOSPITAL ENCOUNTER (OUTPATIENT)
Dept: RADIOLOGY | Facility: HOSPITAL | Age: 59
Discharge: HOME | End: 2024-01-30
Payer: COMMERCIAL

## 2024-01-30 VITALS
OXYGEN SATURATION: 99 % | RESPIRATION RATE: 18 BRPM | TEMPERATURE: 97.9 F | WEIGHT: 260 LBS | SYSTOLIC BLOOD PRESSURE: 124 MMHG | BODY MASS INDEX: 39.4 KG/M2 | DIASTOLIC BLOOD PRESSURE: 91 MMHG | HEART RATE: 80 BPM | HEIGHT: 68 IN

## 2024-01-30 DIAGNOSIS — M54.2 CERVICALGIA: ICD-10-CM

## 2024-01-30 DIAGNOSIS — M47.812 CERVICAL SPONDYLOSIS WITHOUT MYELOPATHY: Primary | ICD-10-CM

## 2024-01-30 DIAGNOSIS — M48.062 NEUROGENIC CLAUDICATION DUE TO LUMBAR SPINAL STENOSIS: ICD-10-CM

## 2024-01-30 PROCEDURE — 77003 FLUOROGUIDE FOR SPINE INJECT: CPT | Performed by: ANESTHESIOLOGY

## 2024-01-30 PROCEDURE — 64491 INJ PARAVERT F JNT C/T 2 LEV: CPT | Mod: LT | Performed by: ANESTHESIOLOGY

## 2024-01-30 PROCEDURE — 64491 INJ PARAVERT F JNT C/T 2 LEV: CPT | Performed by: ANESTHESIOLOGY

## 2024-01-30 PROCEDURE — 77003 FLUOROGUIDE FOR SPINE INJECT: CPT

## 2024-01-30 PROCEDURE — 64490 INJ PARAVERT F JNT C/T 1 LEV: CPT | Mod: LT | Performed by: ANESTHESIOLOGY

## 2024-01-30 PROCEDURE — 64490 INJ PARAVERT F JNT C/T 1 LEV: CPT | Performed by: ANESTHESIOLOGY

## 2024-01-30 ASSESSMENT — PAIN - FUNCTIONAL ASSESSMENT: PAIN_FUNCTIONAL_ASSESSMENT: 0-10

## 2024-01-30 ASSESSMENT — PAIN SCALES - GENERAL
PAINLEVEL_OUTOF10: 0 - NO PAIN
PAINLEVEL_OUTOF10: 5 - MODERATE PAIN

## 2024-01-30 NOTE — PROCEDURES
Pre-Op Diagnosis: Cervical spondylosis   Post-Procedure Diagnosis: Same as preop diagnosis  Procedure: Left C3, C4 and C5 medial nerve branch blocks using fluoroscopic guidance  Surgeon: Hany Higuera MD, PhD   Resident/Fellow/Other Assistant: Khai Henson MD     Procedure Note:     Mr. Allen Vela is a 58 y.o. male with cervical spondylosis and left-sided neck pain presents for cervical diagnostic medial branch nerve block, his first.    Following informed consent, the patient was brought to the operating room and placed in the prone position.  The back of the neck area was prepped in sterile fashion using chlorhexidine and draped with sterile drapes.  Using fluoroscopic guidance, the skin and subcutaneous tissues overlying needle trajectories were anesthetized with as total of 2 cc 0.5% lidocaine to the left of midline.  25-gauge spinal needles were then advanced under fluoroscopic guidance the appropriate target sites on the left side and needle positions were confirmed in AP and lateral views.  Injection of small amounts of iohexol contrast revealed appropriate spread without vascular uptake. Thereafter, 0.3 cc of 2% lidocaine and was injected at each needle tip at the needles were removed.  Patient was then transferred to the recovery room in stable condition.     In the recovery area, the patient reported greater than 80% improvement in his pain.  As such, he will be scheduled for a repeat confirmatory diagnostic cervical facet injection on the left side.

## 2024-01-30 NOTE — H&P
History Of Present Illness  Allen Vela is a 58 y.o. male presents for procedure state below. Endorses no changes in past medical history or medical health since last seen in clinic.     Past Medical History  He has a past medical history of COPD (chronic obstructive pulmonary disease) (CMS/[facility]), Depression, Gout, Hypertension, Torn rotator cuff, and Ulcer of abdomen wall (CMS/[facility]).    Surgical History  He has no past surgical history on file.     Social History  He reports that he has quit smoking. His smoking use included cigarettes. He has a 15.00 pack-year smoking history. He has never used smokeless tobacco. He reports that he does not drink alcohol and does not use drugs.    Family History  Family History   Problem Relation Name Age of Onset    COPD Mother      Heart failure Mother      Heart failure Father      Diabetes Maternal Grandmother          Allergies  Montelukast and Ace inhibitors    Review of Symptoms:   Constitutional: Negative for chills, diaphoresis or fever  HENT: Negative for neck swelling  Eyes:.  Negative for eye pain  Respiratory:.  Negative for cough, shortness of breath or wheezing    Cardiovascular:.  Negative for chest pain or palpitations  Gastrointestinal:.  Negative for abdominal pain, nausea and vomiting  Genitourinary:.  Negative for urgency  Musculoskeletal:  Positive for neck pain. Positive for joint pain. Denies falls within the past 3 months.  Skin: Negative for wounds or itching   Neurological: Negative for dizziness, seizures, loss of consciousness and weakness  Endo/Heme/Allergies: Does not bruise/bleed easily  Psychiatric/Behavioral: Negative for depression. The patient does not appear anxious.       PHYSICAL EXAM  Vitals signs reviewed  Constitutional:       General: Not in acute distress     Appearance: Normal appearance. Not ill-appearing.  HENT:     Head: Normocephalic and atraumatic  Eyes:     Conjunctiva/sclera: Conjunctivae normal  Cardiovascular:     Rate and  Rhythm: Normal rate and regular rhythm  Pulmonary:     Effort: No respiratory distress  Abdominal:     Palpations: Abdomen is soft  Musculoskeletal: CERVANTES  Skin:     General: Skin is warm and dry  Neurological:     General: No focal deficit present  Psychiatric:         Mood and Affect: Mood normal         Behavior: Behavior normal     Last Recorded Vitals  BP (!) 143/106   Pulse 80   Temp 36.3 °C (97.3 °F)   Resp 18   Wt 118 kg (260 lb)   SpO2 99%     Relevant Results  Current Outpatient Medications   Medication Instructions    allopurinol (ZYLOPRIM) 100 mg, oral, Daily    amLODIPine (NORVASC) 5 mg, oral, Daily    atenolol (TENORMIN) 100 mg, oral, Daily RT    atorvastatin (LIPITOR) 20 mg, oral, Daily RT    buPROPion XL (Wellbutrin XL) 150 mg 24 hr tablet     cholecalciferol (VITAMIN D-3) 2,000 Units, oral, Daily    DULoxetine (CYMBALTA) 60 mg, oral, Daily    esketamine (SPRAVATO) 42 mg, Each Nostril, 2 times weekly    furosemide (Lasix) 20 mg tablet 1 tablet, oral, Daily    hydroxychloroquine (PLAQUENIL) 200 mg, oral, Daily RT    hydrOXYzine HCL (Atarax) 50 mg tablet     pantoprazole (ProtoNix) 40 mg EC tablet     spironolactone (Aldactone) 25 mg tablet     tiotropium-olodateroL (Stiolto Respimat) 2.5-2.5 mcg/actuation mist inhaler 2 Inhalations, inhalation, Daily    traMADol (ULTRAM) 50 mg, oral, 4 times daily PRN    Ventolin HFA 90 mcg/actuation inhaler 2 puffs, inhalation, Every 6 hours PRN         XR cervical spine 2-3 views 11/07/2023    Narrative  Interpreted By:  Maura Darling,  STUDY:  Cervical spine, 2 views.    INDICATION:  Signs/Symptoms:cervicalgia.    COMPARISON:  None.    ACCESSION NUMBER(S):  [id number]    ORDERING CLINICIAN:  ZAYRA SHABAZZ    FINDINGS:  Alignment is within normal limits.  Moderate spondylosis at C3-4 with disc height loss and osteophytes.  Mild spondylosis at C4-5, C5-6, and C6-7 with mild disc height loss  and small anterior osteophytes evident. Vertebral body heights  are  preserved. Posterior elements are intact. Prevertebral soft tissues  are unremarkable. Bilateral carotid artery vascular calcifications  noted.    Impression  1. Moderate C3-4 degenerative changes. Mild degenerative changes from  C4-5 to C6-7.    MACRO:  None.    Signed by: Maura Darling 11/8/2023 1:08 PM  Dictation workstation:   [id number]     No image results found.       1. Neurogenic claudication due to lumbar spinal stenosis  FL pain management TC    FL pain management TC     Medial Nerve Branch Block     Medial Nerve Branch Block           ASSESSMENT/PLAN  Allen Vela is a 58 y.o. male presents for left C3,4,5 medial branch block    Our plan is as follows:  - Follow In pain clinic  - Continue to participate in physical therapy as well as physician directed home exercises  - Continue pain medications as prescribed       Arias Ng MD

## 2024-02-05 ENCOUNTER — APPOINTMENT (OUTPATIENT)
Dept: PHYSICAL THERAPY | Facility: CLINIC | Age: 59
End: 2024-02-05
Payer: COMMERCIAL

## 2024-02-06 ENCOUNTER — APPOINTMENT (OUTPATIENT)
Dept: RADIOLOGY | Facility: HOSPITAL | Age: 59
End: 2024-02-06
Payer: COMMERCIAL

## 2024-02-12 ENCOUNTER — TREATMENT (OUTPATIENT)
Dept: PHYSICAL THERAPY | Facility: CLINIC | Age: 59
End: 2024-02-12
Payer: COMMERCIAL

## 2024-02-12 DIAGNOSIS — M54.50 CHRONIC BILATERAL LOW BACK PAIN WITHOUT SCIATICA: ICD-10-CM

## 2024-02-12 DIAGNOSIS — G89.29 CHRONIC NECK PAIN: ICD-10-CM

## 2024-02-12 DIAGNOSIS — M54.2 CHRONIC NECK PAIN: ICD-10-CM

## 2024-02-12 DIAGNOSIS — G89.29 CHRONIC BILATERAL LOW BACK PAIN WITHOUT SCIATICA: ICD-10-CM

## 2024-02-12 PROCEDURE — 97140 MANUAL THERAPY 1/> REGIONS: CPT | Mod: GP | Performed by: PHYSICAL THERAPIST

## 2024-02-12 PROCEDURE — 97110 THERAPEUTIC EXERCISES: CPT | Mod: GP | Performed by: PHYSICAL THERAPIST

## 2024-02-12 NOTE — PROGRESS NOTES
Physical Therapy    Physical Therapy Treatment    Patient Name: Allen Vela  MRN: [id number]  Today's Date: 2/12/2024  Time Calculation  Start Time: 1445  Stop Time: 1540  Time Calculation (min): 55 min  Visit #3 of 15  Caresource: 60 units (15 visits) approved from 1/10/24 to 4/10/24.   Reason for PT: chronic cervical and left upper shoulder pain, lower back pain    Assessment:  Left shoulder and neck pain decreased to 5/10 after session. Pain reduced from manual therapy today. Notable soft tissue tenderness during STM axial to left cervical spine into upper trap.     Plan:  Skilled PT treatment to address chronic neck and left shoulder pain, chronic lower back pain, decreased mobility and weakness.     Current Problem  1. Chronic bilateral low back pain without sciatica  Follow Up In Physical Therapy      2. Chronic neck pain  Follow Up In Physical Therapy          General       Subjective    Pt. Reports 7/10 neck and left shoulder pain today, worse then typical today. He took tylenol this morning that took the edge off.   Pt. Underwent Left C3, C4 and C5 medial nerve branch blocks using fluoroscopic guidance by Dr. Higuera on 1/30/24. Patient reports no relief. Has another appointment with Dr. Higuera on 2/13/24    Precautions       Objective   Treatments:  Therapeutic Exercise: 25 minutes  MHP to neck and upper shoulder in supine during lumbar and cervical exercises.   - seated trunk flexion with t-ball roll out. 5s x 10  - supine cervical rotation with overpressure. 5s x 10 ea  - supine chin tuck/DNF strengthening 3s x 15  - supine shoulder flexion AAROM with wand. 3s x 12  - hooklying LTR 5s x 10 ea  Not performed today:  - seated cervical sidebend stretch. 30s x 3 ea  - seated cervical retraction 3s x 15    Manual Therapy: 30 minutes  - left shoulder distraction and inferior/posterior grade III - IV mobs.   - cervical traction x 5 minutes  - bilateral mid cervical opening mobs  - A-O mobs (nods)  - STM/MFR left  cervical and shoulder musculature, upper trap, paraspinals, levator, scalenes  OP EDUCATION:    Goals:  Active       neck and low back pain       Oswestry       Start:  01/10/24    Expected End:  03/25/24       Improve oswestry to < 58%          lumbar AROM       Start:  01/10/24    Expected End:  03/25/24       Improve lumbar flexion and side flexion by 10% to improve function         Hip and core strength       Start:  01/10/24    Expected End:  03/25/24       Hip strength to 5/5 globally, improve core strength for greater stabilization and reduced spinal stress         cervical AROM       Start:  01/10/24    Expected End:  03/25/24       Increase cervical flexion and extension by > 10 deg, rotation > 10 deg, right SB > 6 deg.          Patient Stated Goal 1       Start:  01/10/24    Expected End:  03/25/24       Increase sitting and standing time with less neck and low back pain

## 2024-02-13 ENCOUNTER — HOSPITAL ENCOUNTER (OUTPATIENT)
Dept: RADIOLOGY | Facility: HOSPITAL | Age: 59
Discharge: HOME | End: 2024-02-13
Payer: COMMERCIAL

## 2024-02-13 VITALS
WEIGHT: 270 LBS | RESPIRATION RATE: 16 BRPM | HEART RATE: 78 BPM | OXYGEN SATURATION: 96 % | BODY MASS INDEX: 40.92 KG/M2 | HEIGHT: 68 IN | SYSTOLIC BLOOD PRESSURE: 139 MMHG | TEMPERATURE: 98.8 F | DIASTOLIC BLOOD PRESSURE: 90 MMHG

## 2024-02-13 DIAGNOSIS — M54.2 CERVICALGIA: Primary | ICD-10-CM

## 2024-02-13 DIAGNOSIS — M47.812 CERVICAL SPONDYLOSIS WITHOUT MYELOPATHY: ICD-10-CM

## 2024-02-13 PROCEDURE — 77003 FLUOROGUIDE FOR SPINE INJECT: CPT

## 2024-02-13 PROCEDURE — 64491 INJ PARAVERT F JNT C/T 2 LEV: CPT | Mod: LT | Performed by: ANESTHESIOLOGY

## 2024-02-13 PROCEDURE — 64490 INJ PARAVERT F JNT C/T 1 LEV: CPT | Performed by: ANESTHESIOLOGY

## 2024-02-13 PROCEDURE — 64490 INJ PARAVERT F JNT C/T 1 LEV: CPT | Mod: LT | Performed by: ANESTHESIOLOGY

## 2024-02-13 PROCEDURE — 64491 INJ PARAVERT F JNT C/T 2 LEV: CPT | Performed by: ANESTHESIOLOGY

## 2024-02-13 ASSESSMENT — PAIN SCALES - GENERAL
PAINLEVEL_OUTOF10: 7
PAINLEVEL_OUTOF10: 2
PAINLEVEL_OUTOF10: 5 - MODERATE PAIN
PAINLEVEL_OUTOF10: 7

## 2024-02-13 ASSESSMENT — PAIN - FUNCTIONAL ASSESSMENT
PAIN_FUNCTIONAL_ASSESSMENT: WONG-BAKER FACES
PAIN_FUNCTIONAL_ASSESSMENT: 0-10

## 2024-02-13 NOTE — PROCEDURES
Pre-Op Diagnosis: Cervical spondylosis   Post-Procedure Diagnosis: Same as preop diagnosis  Procedure: Left C3, C4 and C5 medial nerve branch block using fluoroscopic guidance  Surgeon: Hany Higuera MD, PhD   Resident/Fellow/Other Assistant: Carly Rich DO     Procedure Note:     Mr. Allen Vela is a 58 y.o. male with cervical spondylosis and left-sided neck pain presenting for his second diagnostic cervical facet procedure.  He had excellent pain relief appointment first procedure.    Following informed consent, the patient was brought to the operating room and placed in the prone position.  The back of the neck area was prepped in sterile fashion using chlorhexidine and draped with sterile drapes.  Using fluoroscopic guidance, the skin and subcutaneous tissues overlying needle trajectories were anesthetized with as total of 2 cc 0.5% lidocaine to the left of midline.  25-gauge spinal needles were then advanced under fluoroscopic guidance the appropriate target sites on the left side and needle positions were confirmed in AP and lateral views.  Injection of small amounts of iohexol contrast revealed appropriate spread without vascular uptake. Thereafter, 0.3 cc of 2% lidocaine and was injected at each needle tip at the needles were removed.  Patient was then transferred to the recovery room in stable condition.     The recovery area, the patient reported 80% pain relief on the left side of the neck.  As such, he will be scheduled for radiofrequency neurotomy of these nerves.

## 2024-02-13 NOTE — H&P
Pain Management H&P    History Of Present Illness  Allen Vela is a 58 y.o. male with a past medical history of left-sided neck pain and cervical spondylosis who presents for left C3, C4, C5 medial nerve branch block #2      Past Medical History  He has a past medical history of COPD (chronic obstructive pulmonary disease) (CMS/[facility]), Depression, Gout, Hypertension, Torn rotator cuff, and Ulcer of abdomen wall (CMS/[facility]).    Surgical History  He has no past surgical history on file.     Social History  He reports that he has quit smoking. His smoking use included cigarettes. He has a 15.00 pack-year smoking history. He has never used smokeless tobacco. He reports current alcohol use. He reports that he does not use drugs.    Family History  Family History   Problem Relation Name Age of Onset    COPD Mother      Heart failure Mother      Heart failure Father      Diabetes Maternal Grandmother          Allergies  Montelukast and Ace inhibitors    Review of Symptoms:   Constitutional: Negative for chills, diaphoresis or fever  HENT: Negative for neck swelling  Eyes:.  Negative for eye pain  Respiratory:.  Negative for cough, shortness of breath or wheezing    Cardiovascular:.  Negative for chest pain or palpitations  Gastrointestinal:.  Negative for abdominal pain, nausea and vomiting  Genitourinary:.  Negative for urgency  Musculoskeletal:  Positive for neck pain. Positive for joint pain. Denies falls within the past 3 months.  Skin: Negative for wounds or itching   Neurological: Negative for dizziness, seizures, loss of consciousness and weakness  Endo/Heme/Allergies: Does not bruise/bleed easily  Psychiatric/Behavioral: Negative for depression. The patient does not appear anxious.       PHYSICAL EXAM  Vitals signs reviewed  Constitutional:       General: Not in acute distress     Appearance: Normal appearance. Not ill-appearing.  HENT:     Head: Normocephalic and atraumatic  Eyes:     Conjunctiva/sclera:  Conjunctivae normal  Cardiovascular:     Rate and Rhythm: Normal rate and regular rhythm  Pulmonary:     Effort: No respiratory distress  Abdominal:     Palpations: Abdomen is soft  Musculoskeletal: CERVANTES  Skin:     General: Skin is warm and dry  Neurological:     General: No focal deficit present  Psychiatric:         Mood and Affect: Mood normal         Behavior: Behavior normal     Last Recorded Vitals  BP (!) 164/102   Pulse 82   Temp 36.7 °C (98.1 °F)   Resp 17   Wt 122 kg (270 lb)   SpO2 97%     Relevant Results  Current Outpatient Medications   Medication Instructions    allopurinol (ZYLOPRIM) 100 mg, oral, Daily    amLODIPine (NORVASC) 5 mg, oral, Daily    atenolol (TENORMIN) 100 mg, oral, Daily RT    atorvastatin (LIPITOR) 20 mg, oral, Daily RT    buPROPion XL (Wellbutrin XL) 150 mg 24 hr tablet     cholecalciferol (VITAMIN D-3) 2,000 Units, oral, Daily    DULoxetine (CYMBALTA) 60 mg, oral, Daily    esketamine (SPRAVATO) 42 mg, Each Nostril, 2 times weekly    furosemide (Lasix) 20 mg tablet 1 tablet, oral, Daily    hydroxychloroquine (PLAQUENIL) 200 mg, oral, Daily RT    hydrOXYzine HCL (Atarax) 50 mg tablet     pantoprazole (ProtoNix) 40 mg EC tablet     spironolactone (Aldactone) 25 mg tablet     tiotropium-olodateroL (Stiolto Respimat) 2.5-2.5 mcg/actuation mist inhaler 2 Inhalations, inhalation, Daily    traMADol (ULTRAM) 50 mg, oral, 4 times daily PRN    Ventolin HFA 90 mcg/actuation inhaler 2 puffs, inhalation, Every 6 hours PRN         XR cervical spine 2-3 views 11/07/2023    Narrative  Interpreted By:  Maura Darling,  STUDY:  Cervical spine, 2 views.    INDICATION:  Signs/Symptoms:cervicalgia.    COMPARISON:  None.    ACCESSION NUMBER(S):  [id number]    ORDERING CLINICIAN:  ZAYRA SHABAZZ    FINDINGS:  Alignment is within normal limits.  Moderate spondylosis at C3-4 with disc height loss and osteophytes.  Mild spondylosis at C4-5, C5-6, and C6-7 with mild disc height loss  and small  anterior osteophytes evident. Vertebral body heights are  preserved. Posterior elements are intact. Prevertebral soft tissues  are unremarkable. Bilateral carotid artery vascular calcifications  noted.    Impression  1. Moderate C3-4 degenerative changes. Mild degenerative changes from  C4-5 to C6-7.    MACRO:  None.    Signed by: Maura Darling 11/8/2023 1:08 PM  Dictation workstation:   [id number]     No image results found.       1. Cervical spondylosis without myelopathy  FL pain management TC    FL pain management TC     Medial Nerve Branch Block     Medial Nerve Branch Block           ASSESSMENT/PLAN  Allen Vela is a 58 y.o. male with a past medical history of left-sided neck pain and cervical spondylosis who presents for left C3, C4, C5 medial nerve branch block #2     Risks, benefits, alternatives discussed. All questions answered to the best of my ability. Patient agrees to proceed.      Our plan is as follows:  - Proceed with aforementioned procedure        Carly Rich DO   Pain fellow

## 2024-02-16 ENCOUNTER — LAB (OUTPATIENT)
Dept: LAB | Facility: LAB | Age: 59
End: 2024-02-16
Payer: COMMERCIAL

## 2024-02-16 DIAGNOSIS — N18.4 CHRONIC KIDNEY DISEASE, STAGE 4 (SEVERE) (MULTI): ICD-10-CM

## 2024-02-16 DIAGNOSIS — Z12.5 ENCOUNTER FOR SCREENING FOR MALIGNANT NEOPLASM OF PROSTATE: Primary | ICD-10-CM

## 2024-02-16 DIAGNOSIS — N18.30 CHRONIC KIDNEY DISEASE, STAGE 3 UNSPECIFIED (MULTI): ICD-10-CM

## 2024-02-16 DIAGNOSIS — Z13.1 ENCOUNTER FOR SCREENING FOR DIABETES MELLITUS: ICD-10-CM

## 2024-02-16 DIAGNOSIS — J42 CHRONIC BRONCHITIS, UNSPECIFIED CHRONIC BRONCHITIS TYPE (MULTI): ICD-10-CM

## 2024-02-16 DIAGNOSIS — F32.A DEPRESSION, UNSPECIFIED: ICD-10-CM

## 2024-02-16 LAB
ALBUMIN SERPL BCP-MCNC: 4.2 G/DL (ref 3.4–5)
ALP SERPL-CCNC: 58 U/L (ref 33–136)
ALT SERPL W P-5'-P-CCNC: 20 U/L (ref 10–52)
ANION GAP SERPL CALC-SCNC: 13 MMOL/L (ref 10–20)
AST SERPL W P-5'-P-CCNC: 20 U/L (ref 9–39)
BASOPHILS # BLD AUTO: 0.03 X10*3/UL (ref 0–0.1)
BASOPHILS NFR BLD AUTO: 0.8 %
BILIRUB SERPL-MCNC: 0.6 MG/DL (ref 0–1.2)
BUN SERPL-MCNC: 19 MG/DL (ref 6–23)
CALCIUM SERPL-MCNC: 9.4 MG/DL (ref 8.6–10.6)
CHLORIDE SERPL-SCNC: 108 MMOL/L (ref 98–107)
CHOLEST SERPL-MCNC: 108 MG/DL (ref 0–199)
CHOLESTEROL/HDL RATIO: 2.6
CO2 SERPL-SCNC: 23 MMOL/L (ref 21–32)
CREAT SERPL-MCNC: 2.25 MG/DL (ref 0.5–1.3)
EGFRCR SERPLBLD CKD-EPI 2021: 33 ML/MIN/1.73M*2
EOSINOPHIL # BLD AUTO: 0.24 X10*3/UL (ref 0–0.7)
EOSINOPHIL NFR BLD AUTO: 6.5 %
ERYTHROCYTE [DISTWIDTH] IN BLOOD BY AUTOMATED COUNT: 14.5 % (ref 11.5–14.5)
EST. AVERAGE GLUCOSE BLD GHB EST-MCNC: 91 MG/DL
GLUCOSE SERPL-MCNC: 108 MG/DL (ref 74–99)
HBA1C MFR BLD: 4.8 %
HCT VFR BLD AUTO: 35.4 % (ref 41–52)
HCV AB SER QL: NONREACTIVE
HDLC SERPL-MCNC: 41.7 MG/DL
HGB BLD-MCNC: 11.7 G/DL (ref 13.5–17.5)
IMM GRANULOCYTES # BLD AUTO: 0.01 X10*3/UL (ref 0–0.7)
IMM GRANULOCYTES NFR BLD AUTO: 0.3 % (ref 0–0.9)
LDLC SERPL CALC-MCNC: 42 MG/DL
LYMPHOCYTES # BLD AUTO: 0.94 X10*3/UL (ref 1.2–4.8)
LYMPHOCYTES NFR BLD AUTO: 25.3 %
MCH RBC QN AUTO: 33.3 PG (ref 26–34)
MCHC RBC AUTO-ENTMCNC: 33.1 G/DL (ref 32–36)
MCV RBC AUTO: 101 FL (ref 80–100)
MONOCYTES # BLD AUTO: 0.54 X10*3/UL (ref 0.1–1)
MONOCYTES NFR BLD AUTO: 14.5 %
NEUTROPHILS # BLD AUTO: 1.96 X10*3/UL (ref 1.2–7.7)
NEUTROPHILS NFR BLD AUTO: 52.6 %
NON HDL CHOLESTEROL: 66 MG/DL (ref 0–149)
NRBC BLD-RTO: 0 /100 WBCS (ref 0–0)
PLATELET # BLD AUTO: 244 X10*3/UL (ref 150–450)
POTASSIUM SERPL-SCNC: 3.9 MMOL/L (ref 3.5–5.3)
PROT SERPL-MCNC: 6.4 G/DL (ref 6.4–8.2)
PSA SERPL-MCNC: 1.16 NG/ML
RBC # BLD AUTO: 3.51 X10*6/UL (ref 4.5–5.9)
SODIUM SERPL-SCNC: 140 MMOL/L (ref 136–145)
TRIGL SERPL-MCNC: 123 MG/DL (ref 0–149)
TSH SERPL-ACNC: 2.16 MIU/L (ref 0.44–3.98)
VLDL: 25 MG/DL (ref 0–40)
WBC # BLD AUTO: 3.7 X10*3/UL (ref 4.4–11.3)

## 2024-02-16 PROCEDURE — 82570 ASSAY OF URINE CREATININE: CPT

## 2024-02-16 PROCEDURE — 80053 COMPREHEN METABOLIC PANEL: CPT

## 2024-02-16 PROCEDURE — 86803 HEPATITIS C AB TEST: CPT

## 2024-02-16 PROCEDURE — 84443 ASSAY THYROID STIM HORMONE: CPT

## 2024-02-16 PROCEDURE — 82043 UR ALBUMIN QUANTITATIVE: CPT

## 2024-02-16 PROCEDURE — 83036 HEMOGLOBIN GLYCOSYLATED A1C: CPT

## 2024-02-16 PROCEDURE — 85025 COMPLETE CBC W/AUTO DIFF WBC: CPT

## 2024-02-16 PROCEDURE — 80061 LIPID PANEL: CPT

## 2024-02-16 PROCEDURE — 84153 ASSAY OF PSA TOTAL: CPT

## 2024-02-16 PROCEDURE — 36415 COLL VENOUS BLD VENIPUNCTURE: CPT

## 2024-02-16 PROCEDURE — 82784 ASSAY IGA/IGD/IGG/IGM EACH: CPT

## 2024-02-16 PROCEDURE — 81003 URINALYSIS AUTO W/O SCOPE: CPT

## 2024-02-17 LAB
APPEARANCE UR: CLEAR
BILIRUB UR STRIP.AUTO-MCNC: NEGATIVE MG/DL
COLOR UR: NORMAL
CREAT UR-MCNC: 96.5 MG/DL (ref 20–370)
GLUCOSE UR STRIP.AUTO-MCNC: NORMAL MG/DL
HOLD SPECIMEN: NORMAL
IGA SERPL-MCNC: 204 MG/DL (ref 70–400)
IGG SERPL-MCNC: 856 MG/DL (ref 700–1600)
IGM SERPL-MCNC: 10 MG/DL (ref 40–230)
KETONES UR STRIP.AUTO-MCNC: NEGATIVE MG/DL
LEUKOCYTE ESTERASE UR QL STRIP.AUTO: NEGATIVE
MICROALBUMIN UR-MCNC: 24.8 MG/L
MICROALBUMIN/CREAT UR: 25.7 UG/MG CREAT
NITRITE UR QL STRIP.AUTO: NEGATIVE
PH UR STRIP.AUTO: 6 [PH]
PROT UR STRIP.AUTO-MCNC: NEGATIVE MG/DL
RBC # UR STRIP.AUTO: NEGATIVE /UL
SP GR UR STRIP.AUTO: 1.01
UROBILINOGEN UR STRIP.AUTO-MCNC: NORMAL MG/DL

## 2024-02-19 ENCOUNTER — APPOINTMENT (OUTPATIENT)
Dept: PHYSICAL THERAPY | Facility: CLINIC | Age: 59
End: 2024-02-19
Payer: COMMERCIAL

## 2024-02-26 ENCOUNTER — TREATMENT (OUTPATIENT)
Dept: PHYSICAL THERAPY | Facility: CLINIC | Age: 59
End: 2024-02-26
Payer: COMMERCIAL

## 2024-02-26 DIAGNOSIS — M48.062 NEUROGENIC CLAUDICATION DUE TO LUMBAR SPINAL STENOSIS: ICD-10-CM

## 2024-02-26 DIAGNOSIS — M54.50 CHRONIC BILATERAL LOW BACK PAIN WITHOUT SCIATICA: Primary | ICD-10-CM

## 2024-02-26 DIAGNOSIS — G89.29 CHRONIC BILATERAL LOW BACK PAIN WITHOUT SCIATICA: Primary | ICD-10-CM

## 2024-02-26 DIAGNOSIS — M54.2 CHRONIC NECK PAIN: ICD-10-CM

## 2024-02-26 DIAGNOSIS — G89.29 CHRONIC NECK PAIN: ICD-10-CM

## 2024-02-26 PROCEDURE — 97110 THERAPEUTIC EXERCISES: CPT | Mod: GP,CQ

## 2024-02-26 PROCEDURE — 97140 MANUAL THERAPY 1/> REGIONS: CPT | Mod: GP,CQ

## 2024-02-26 ASSESSMENT — PAIN SCALES - GENERAL: PAINLEVEL_OUTOF10: 6

## 2024-02-26 ASSESSMENT — PAIN - FUNCTIONAL ASSESSMENT: PAIN_FUNCTIONAL_ASSESSMENT: 0-10

## 2024-02-26 NOTE — PROGRESS NOTES
Physical Therapy    Physical Therapy Treatment    Patient Name: Allen Vela  MRN: [id number]  Today's Date: 2/26/2024  Time Calculation  Start Time: 0300  Stop Time: 0345  Time Calculation (min): 45 min  Visit #4 of 15  Caresource: 60 units (15 visits) approved from 1/10/24 to 4/10/24.   Reason for PT: chronic cervical and left upper shoulder pain, lower back pain    Assessment:  Patient performs repeated exercises well without major complaints, adjustments made to bosu ball roll outs today to avoid pain in left shoulder. Responds well to manual overall, patient felt the joint mobilizations through the left shoulder helped relieve some of the pain. Attempted scapular punches, increased shoulder pain, stopped exercise. Finished today's treatment with less pain through the neck now at a  5/10, 6/10 left shoulder, 4/10 low back pain. No other major complaints/concerns.     Plan:  Skilled PT treatment to address chronic neck and left shoulder pain, chronic lower back pain, decreased mobility and weakness.     Current Problem  1. Chronic bilateral low back pain without sciatica  Follow Up In Physical Therapy      2. Chronic neck pain  Follow Up In Physical Therapy      3. Neurogenic claudication due to lumbar spinal stenosis        General     Subjective    Reports 6/10 pain through both his lower back and neck and this time, 7/10 pain through the left shoulder. Has not performed his HEP since given. Lupus flare up 10 days ago that has slowly dissipated over the past couple days. No other major updates/concerns at the moment.     Precautions     Objective     Treatments:  Therapeutic Exercise: 30 minutes  MHP to neck and upper shoulder in supine during lumbar and cervical exercises.   - seated trunk flexion with t-ball roll out. 5s x 10  - supine cervical rotation with overpressure. 5s x 10 ea  - supine chin tuck/DNF strengthening 3s x 15  - seated scapular retractions x 20  - supine shoulder flexion AAROM with wand. 3s  x 12  - hooklying LTR 5s x 10 ea    Manual Therapy: 15 minutes  - left shoulder distraction and inferior/posterior grade III - IV mobs.   - cervical traction x 5 minutes  - STM/MFR left cervical and shoulder musculature, upper trap, paraspinals, levator, scalenes  - SCM stretching    (Not today)  - bilateral mid cervical opening mobs  - A-O mobs (nods)    OP EDUCATION:  Goals:  Active       neck and low back pain       Oswestry       Start:  01/10/24    Expected End:  03/25/24       Improve oswestry to < 58%          lumbar AROM       Start:  01/10/24    Expected End:  03/25/24       Improve lumbar flexion and side flexion by 10% to improve function         Hip and core strength       Start:  01/10/24    Expected End:  03/25/24       Hip strength to 5/5 globally, improve core strength for greater stabilization and reduced spinal stress         cervical AROM       Start:  01/10/24    Expected End:  03/25/24       Increase cervical flexion and extension by > 10 deg, rotation > 10 deg, right SB > 6 deg.          Patient Stated Goal 1       Start:  01/10/24    Expected End:  03/25/24       Increase sitting and standing time with less neck and low back pain

## 2024-02-27 ENCOUNTER — APPOINTMENT (OUTPATIENT)
Dept: RADIOLOGY | Facility: HOSPITAL | Age: 59
End: 2024-02-27
Payer: COMMERCIAL

## 2024-03-01 ENCOUNTER — OFFICE VISIT (OUTPATIENT)
Dept: NEPHROLOGY | Facility: CLINIC | Age: 59
End: 2024-03-01
Payer: COMMERCIAL

## 2024-03-01 VITALS
DIASTOLIC BLOOD PRESSURE: 92 MMHG | TEMPERATURE: 97.8 F | SYSTOLIC BLOOD PRESSURE: 136 MMHG | BODY MASS INDEX: 40.6 KG/M2 | WEIGHT: 267 LBS | HEART RATE: 76 BPM

## 2024-03-01 DIAGNOSIS — N18.30 STAGE 3 CHRONIC KIDNEY DISEASE, UNSPECIFIED WHETHER STAGE 3A OR 3B CKD (MULTI): ICD-10-CM

## 2024-03-01 DIAGNOSIS — Z79.1 NSAID LONG-TERM USE: ICD-10-CM

## 2024-03-01 DIAGNOSIS — I10 ESSENTIAL HYPERTENSION: Primary | ICD-10-CM

## 2024-03-01 DIAGNOSIS — M32.10 SYSTEMIC LUPUS ERYTHEMATOSUS, ORGAN OR SYSTEM INVOLVEMENT UNSPECIFIED (MULTI): ICD-10-CM

## 2024-03-01 PROCEDURE — 3075F SYST BP GE 130 - 139MM HG: CPT | Performed by: NURSE PRACTITIONER

## 2024-03-01 PROCEDURE — 1036F TOBACCO NON-USER: CPT | Performed by: NURSE PRACTITIONER

## 2024-03-01 PROCEDURE — 99213 OFFICE O/P EST LOW 20 MIN: CPT | Performed by: NURSE PRACTITIONER

## 2024-03-01 PROCEDURE — 3080F DIAST BP >= 90 MM HG: CPT | Performed by: NURSE PRACTITIONER

## 2024-03-01 ASSESSMENT — ENCOUNTER SYMPTOMS
CONSTITUTIONAL NEGATIVE: 1
CARDIOVASCULAR NEGATIVE: 1
NEUROLOGICAL NEGATIVE: 1
GASTROINTESTINAL NEGATIVE: 1
ARTHRALGIAS: 1
EYES NEGATIVE: 1
RESPIRATORY NEGATIVE: 1
PSYCHIATRIC NEGATIVE: 1
HEMATOLOGIC/LYMPHATIC NEGATIVE: 1
ENDOCRINE NEGATIVE: 1

## 2024-03-01 NOTE — PROGRESS NOTES
History Of Present Illness  Allen Vela is a 58 y.o. 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     Past Medical History  As above.    Surgical History  He has no past surgical history on file.     Social History  He reports that he has quit smoking. His smoking use included cigarettes. He has a 15.00 pack-year smoking history. He has never used smokeless tobacco. He reports current alcohol use. He reports that he does not use drugs.    Family History  Family History   Problem Relation Name Age of Onset    COPD Mother      Heart failure Mother      Heart failure Father      Diabetes Maternal Grandmother          Allergies  Montelukast and Ace inhibitors    Review of Systems   Constitutional: Negative.    HENT: Negative.     Eyes: Negative.    Respiratory: Negative.     Cardiovascular: Negative.    Gastrointestinal: Negative.    Endocrine: Negative.    Genitourinary: Negative.    Musculoskeletal:  Positive for arthralgias.   Skin: Negative.    Neurological: Negative.    Hematological: Negative.    Psychiatric/Behavioral: Negative.          Physical Exam  Constitutional:       Appearance: Normal appearance.   HENT:      Head: Normocephalic and atraumatic.      Mouth/Throat:      Mouth: Mucous membranes are moist.   Cardiovascular:      Rate and Rhythm: Normal rate and regular rhythm.      Pulses: Normal pulses.      Heart sounds: Normal heart sounds.   Pulmonary:      Effort: Pulmonary effort is normal.      Breath sounds: Normal breath sounds.   Musculoskeletal:      Comments: Chronic joint pain   Skin:     General: Skin is warm and dry.   Neurological:      General: No focal deficit present.      Mental Status: He is alert and oriented to person, place, and time.   Psychiatric:         Mood and Affect: Mood normal.         Behavior: Behavior normal.          Thought Content: Thought content normal.         Judgment: Judgment normal.        Last Recorded Vitals  Blood pressure (!) 136/92, pulse 76, temperature 36.6 °C (97.8 °F), weight 121 kg (267 lb).    Relevant Results  Recent Results (from the past 504 hour(s))   Immunoglobulins (IgG, IgA, IgM)    Collection Time: 02/16/24  1:41 PM   Result Value Ref Range    IgG 856 700 - 1,600 mg/dL    IgA 204 70 - 400 mg/dL    IgM 10 (L) 40 - 230 mg/dL   CBC and Auto Differential    Collection Time: 02/16/24  1:41 PM   Result Value Ref Range    WBC 3.7 (L) 4.4 - 11.3 x10*3/uL    nRBC 0.0 0.0 - 0.0 /100 WBCs    RBC 3.51 (L) 4.50 - 5.90 x10*6/uL    Hemoglobin 11.7 (L) 13.5 - 17.5 g/dL    Hematocrit 35.4 (L) 41.0 - 52.0 %     (H) 80 - 100 fL    MCH 33.3 26.0 - 34.0 pg    MCHC 33.1 32.0 - 36.0 g/dL    RDW 14.5 11.5 - 14.5 %    Platelets 244 150 - 450 x10*3/uL    Neutrophils % 52.6 40.0 - 80.0 %    Immature Granulocytes %, Automated 0.3 0.0 - 0.9 %    Lymphocytes % 25.3 13.0 - 44.0 %    Monocytes % 14.5 2.0 - 10.0 %    Eosinophils % 6.5 0.0 - 6.0 %    Basophils % 0.8 0.0 - 2.0 %    Neutrophils Absolute 1.96 1.20 - 7.70 x10*3/uL    Immature Granulocytes Absolute, Automated 0.01 0.00 - 0.70 x10*3/uL    Lymphocytes Absolute 0.94 (L) 1.20 - 4.80 x10*3/uL    Monocytes Absolute 0.54 0.10 - 1.00 x10*3/uL    Eosinophils Absolute 0.24 0.00 - 0.70 x10*3/uL    Basophils Absolute 0.03 0.00 - 0.10 x10*3/uL   Comprehensive Metabolic Panel    Collection Time: 02/16/24  1:41 PM   Result Value Ref Range    Glucose 108 (H) 74 - 99 mg/dL    Sodium 140 136 - 145 mmol/L    Potassium 3.9 3.5 - 5.3 mmol/L    Chloride 108 (H) 98 - 107 mmol/L    Bicarbonate 23 21 - 32 mmol/L    Anion Gap 13 10 - 20 mmol/L    Urea Nitrogen 19 6 - 23 mg/dL    Creatinine 2.25 (H) 0.50 - 1.30 mg/dL    eGFR 33 (L) >60 mL/min/1.73m*2    Calcium 9.4 8.6 - 10.6 mg/dL    Albumin 4.2 3.4 - 5.0 g/dL    Alkaline Phosphatase 58 33 - 136 U/L    Total Protein 6.4 6.4 - 8.2 g/dL     AST 20 9 - 39 U/L    Bilirubin, Total 0.6 0.0 - 1.2 mg/dL    ALT 20 10 - 52 U/L   Lipid Panel    Collection Time: 02/16/24  1:41 PM   Result Value Ref Range    Cholesterol 108 0 - 199 mg/dL    HDL-Cholesterol 41.7 mg/dL    Cholesterol/HDL Ratio 2.6     LDL Calculated 42 <=99 mg/dL    VLDL 25 0 - 40 mg/dL    Triglycerides 123 0 - 149 mg/dL    Non HDL Cholesterol 66 0 - 149 mg/dL   Thyroid Stimulating Hormone    Collection Time: 02/16/24  1:41 PM   Result Value Ref Range    Thyroid Stimulating Hormone 2.16 0.44 - 3.98 mIU/L   Prostate Specific Antigen    Collection Time: 02/16/24  1:41 PM   Result Value Ref Range    Prostate Specific AG 1.16 <=4.00 ng/mL   Hepatitis C Antibody    Collection Time: 02/16/24  1:41 PM   Result Value Ref Range    Hepatitis C AB Nonreactive Nonreactive   Hemoglobin A1C    Collection Time: 02/16/24  1:41 PM   Result Value Ref Range    Hemoglobin A1C 4.8 see below %    Estimated Average Glucose 91 Not Established mg/dL   Albumin , Urine Random    Collection Time: 02/16/24  1:41 PM   Result Value Ref Range    Albumin, Urine Random 24.8 Not established mg/L    Creatinine, Urine Random 96.5 20.0 - 370.0 mg/dL    Albumin/Creatine Ratio 25.7 <30.0 ug/mg Creat   Urinalysis with Reflex Culture and Microscopic    Collection Time: 02/16/24  1:41 PM   Result Value Ref Range    Color, Urine Light-Yellow Light-Yellow, Yellow, Dark-Yellow    Appearance, Urine Clear Clear    Specific Gravity, Urine 1.013 1.005 - 1.035    pH, Urine 6.0 5.0, 5.5, 6.0, 6.5, 7.0, 7.5, 8.0    Protein, Urine NEGATIVE NEGATIVE, 10 (TRACE), 20 (TRACE) mg/dL    Glucose, Urine Normal Normal mg/dL    Blood, Urine NEGATIVE NEGATIVE    Ketones, Urine NEGATIVE NEGATIVE mg/dL    Bilirubin, Urine NEGATIVE NEGATIVE    Urobilinogen, Urine Normal Normal mg/dL    Nitrite, Urine NEGATIVE NEGATIVE    Leukocyte Esterase, Urine NEGATIVE NEGATIVE   Extra Urine Gray Tube    Collection Time: 02/16/24  1:41 PM   Result Value Ref Range    Extra  Tube Hold for add-ons.          Assessment/Plan     58 y.o. 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 Most recent sCr 2.25 mg/dL with eGFR 33 ml/min/1.73m2 (24) - at baseline. Currently stable.    # HTN: BP was slightly elevated in office today. Reportedly BPs are usually ok.    # Lupus: dx in . f/u with rheumatology.    # Hx of NSAIDs use: long-term use for chronic pain. Has significantly cut down since last visit. Patient is actively seeking alternative treatment regimen for chronic pain.    Plan:  - Continue to follow up with pain medicine for chronic pain management so that no continuous NSAIDs use.   - Continue to follow up with rheumatology for optimal lupus management.  - No changes with current medications.  - Reviewed strategies for preserving remaining kidney function includin) Avoidance of NSAIDs including Aleve (Naprosyn), Motrin (Ibuprofen), Mobic (Meloxicam), Celebrex (Celecoxib) and aspirin as well as PPI acid blocking medications such as Prilosec (omeprazole), Protonix (pantoprazole), and Nexium (esomeprazole), as well as other nephrotoxic agents.   2) Avoidance of tobacco or alcohol use.   3) Adequate hydration daily.   4) Blood pressure target 130/80 mmHg.   5) Daily dietary sodium intake less than 2 grams per day.    6) Maintain healthy lifestyle. Healthy diet and regular exercises.   7) Maintain ideal weight.  - FUV: in 4 months or sooner if concerns arise.     Patient verbalized understanding of above. He will not hesitate to contact Division of Nephrology at [phone] with  concerns/questions.    I spent 25 minutes in the professional and overall care of this patient.      Izabela Lundberg, APRN-CNP

## 2024-03-04 ENCOUNTER — APPOINTMENT (OUTPATIENT)
Dept: PHYSICAL THERAPY | Facility: CLINIC | Age: 59
End: 2024-03-04
Payer: COMMERCIAL

## 2024-03-11 ENCOUNTER — TREATMENT (OUTPATIENT)
Dept: PHYSICAL THERAPY | Facility: CLINIC | Age: 59
End: 2024-03-11
Payer: COMMERCIAL

## 2024-03-11 DIAGNOSIS — M54.50 CHRONIC BILATERAL LOW BACK PAIN WITHOUT SCIATICA: ICD-10-CM

## 2024-03-11 DIAGNOSIS — G89.29 CHRONIC BILATERAL LOW BACK PAIN WITHOUT SCIATICA: ICD-10-CM

## 2024-03-11 DIAGNOSIS — G89.29 CHRONIC NECK PAIN: Primary | ICD-10-CM

## 2024-03-11 DIAGNOSIS — M54.2 CHRONIC NECK PAIN: Primary | ICD-10-CM

## 2024-03-11 PROCEDURE — 97140 MANUAL THERAPY 1/> REGIONS: CPT | Mod: GP | Performed by: PHYSICAL THERAPIST

## 2024-03-11 NOTE — PROGRESS NOTES
Physical Therapy    Physical Therapy Treatment    Patient Name: Allen Vela  MRN: [id number]  Today's Date: 3/11/2024  Time Calculation  Start Time: 1500  Stop Time: 1540  Time Calculation (min): 40 min  PT Therapeutic Procedures Time Entry  Manual Therapy Time Entry: 25         Visit #5 of 15  Caresource: 60 units (15 visits) approved from 1/10/24 to 4/10/24.   Reason for PT: chronic cervical and left upper shoulder pain, lower back pain    Assessment:  Neck pain reduced from 7/10 to 5/10 immediately after manual therapy and dry needling today. He demonstrates and reports greater AROM of neck after therapy today as well. He will undergo a cervical ablation in 4 days and returns to PT in 7 days. He will monitor his neck pain and tightness until his ablation to provide feedback to me on whether the dry needling provided lasting relief.   No adverse reaction to dry needling. He tolerated the treatment very well.     Plan:  Skilled PT treatment to address chronic neck and left shoulder pain, chronic lower back pain, decreased mobility and weakness.     Primary Tx Dx: Chronic neck pain  Current Problem  1. Chronic neck pain  Follow Up In Physical Therapy      2. Chronic bilateral low back pain without sciatica  Follow Up In Physical Therapy      General  General  Reason for Referral: neck pain, low back pain  Referred By: Damaris Cano CNP  Subjective    Left sided neck pain is is 7/10 today. Left sided neck pain worsened yesterday without inciting factors. Lower back pain is 5/10 today.  Has not performed his HEP since given.   Unfortunately he does not notice any significant pain relief since beginning PT.   Precautions  Precautions  Precautions Comment: none  Objective     Treatments:  Therapeutic Exercise: Not performed today  MHP to neck and upper shoulder in supine during lumbar and cervical exercises.   - seated trunk flexion with t-ball roll out. 5s x 10  - supine cervical rotation with overpressure. 5s x 10  ea  - supine chin tuck/DNF strengthening 3s x 15  - seated scapular retractions x 20  - supine shoulder flexion AAROM with wand. 3s x 12  - hooklying LTR 5s x 10 ea    Manual Therapy: 25 minutes  - cervical traction x 5 minutes  - STM/MFR left cervical and shoulder musculature, upper trap, paraspinals, levator, scalenes and right paraspinals    Dry Needling: (not billed)  After discussion and agreement, I performed dry needling (TDN) to left and right C3-4 paraspinals, left occipitals, left upper trap x 2, left levator.   **Patient read and signed the dry needling information and agreement form.    OP EDUCATION:  Goals:  Active       neck and low back pain       Oswestry       Start:  01/10/24    Expected End:  03/25/24       Improve oswestry to < 58%          lumbar AROM       Start:  01/10/24    Expected End:  03/25/24       Improve lumbar flexion and side flexion by 10% to improve function         Hip and core strength       Start:  01/10/24    Expected End:  03/25/24       Hip strength to 5/5 globally, improve core strength for greater stabilization and reduced spinal stress         cervical AROM       Start:  01/10/24    Expected End:  03/25/24       Increase cervical flexion and extension by > 10 deg, rotation > 10 deg, right SB > 6 deg.          Patient Stated Goal 1       Start:  01/10/24    Expected End:  03/25/24       Increase sitting and standing time with less neck and low back pain

## 2024-03-12 ENCOUNTER — APPOINTMENT (OUTPATIENT)
Dept: PAIN MEDICINE | Facility: HOSPITAL | Age: 59
End: 2024-03-12
Payer: COMMERCIAL

## 2024-03-15 ENCOUNTER — HOSPITAL ENCOUNTER (OUTPATIENT)
Dept: RADIOLOGY | Facility: HOSPITAL | Age: 59
Discharge: HOME | End: 2024-03-15
Payer: COMMERCIAL

## 2024-03-15 VITALS
DIASTOLIC BLOOD PRESSURE: 91 MMHG | WEIGHT: 265 LBS | HEIGHT: 68 IN | SYSTOLIC BLOOD PRESSURE: 161 MMHG | BODY MASS INDEX: 40.16 KG/M2 | TEMPERATURE: 98.1 F | OXYGEN SATURATION: 98 % | HEART RATE: 85 BPM | RESPIRATION RATE: 16 BRPM

## 2024-03-15 DIAGNOSIS — M54.2 CERVICALGIA: ICD-10-CM

## 2024-03-15 PROCEDURE — 64634 DESTROY C/TH FACET JNT ADDL: CPT | Mod: LT | Performed by: ANESTHESIOLOGY

## 2024-03-15 PROCEDURE — 64633 DESTROY CERV/THOR FACET JNT: CPT | Performed by: ANESTHESIOLOGY

## 2024-03-15 PROCEDURE — 99152 MOD SED SAME PHYS/QHP 5/>YRS: CPT | Performed by: ANESTHESIOLOGY

## 2024-03-15 PROCEDURE — 64633 DESTROY CERV/THOR FACET JNT: CPT | Mod: LT | Performed by: ANESTHESIOLOGY

## 2024-03-15 PROCEDURE — 99153 MOD SED SAME PHYS/QHP EA: CPT | Performed by: ANESTHESIOLOGY

## 2024-03-15 PROCEDURE — 2720000007 HC OR 272 NO HCPCS

## 2024-03-15 PROCEDURE — 64634 DESTROY C/TH FACET JNT ADDL: CPT | Performed by: ANESTHESIOLOGY

## 2024-03-15 PROCEDURE — 2500000004 HC RX 250 GENERAL PHARMACY W/ HCPCS (ALT 636 FOR OP/ED): Performed by: ANESTHESIOLOGY

## 2024-03-15 RX ORDER — FENTANYL CITRATE 50 UG/ML
INJECTION, SOLUTION INTRAMUSCULAR; INTRAVENOUS
Status: COMPLETED | OUTPATIENT
Start: 2024-03-15 | End: 2024-03-15

## 2024-03-15 RX ORDER — MIDAZOLAM HYDROCHLORIDE 1 MG/ML
INJECTION INTRAMUSCULAR; INTRAVENOUS
Status: COMPLETED | OUTPATIENT
Start: 2024-03-15 | End: 2024-03-15

## 2024-03-15 RX ADMIN — MIDAZOLAM HYDROCHLORIDE 2 MG: 1 INJECTION INTRAMUSCULAR; INTRAVENOUS at 13:00

## 2024-03-15 RX ADMIN — FENTANYL CITRATE 50 MCG: 50 INJECTION, SOLUTION INTRAMUSCULAR; INTRAVENOUS at 13:00

## 2024-03-15 ASSESSMENT — PAIN SCALES - GENERAL
PAINLEVEL_OUTOF10: 5 - MODERATE PAIN
PAINLEVEL_OUTOF10: 4
PAINLEVEL_OUTOF10: 5 - MODERATE PAIN
PAINLEVEL_OUTOF10: 4
PAINLEVEL_OUTOF10: 5 - MODERATE PAIN

## 2024-03-15 ASSESSMENT — PAIN - FUNCTIONAL ASSESSMENT
PAIN_FUNCTIONAL_ASSESSMENT: 0-10

## 2024-03-15 ASSESSMENT — PAIN DESCRIPTION - DESCRIPTORS: DESCRIPTORS: SQUEEZING

## 2024-03-15 NOTE — PROCEDURES
Pre-Op Diagnosis: Cervical spondylosis   Post-Procedure Diagnosis: Same as preop diagnosis  Procedure: Left C3, C4 and C5 medial branch radiofrequency neurotomy procedure under fluoroscopic guidance  Surgeon: Hany Higuera MD, PhD   Resident/Fellow/Other Assistant: Khai Henson MD and Ace King MD     Procedure Note:     Mr. Allen Vela is a 58 y.o. male with history of cervical spondylosis presenting for radiofrequency neurotomy of the left C3, C4 and C5 medial nerve branches having had successful diagnostic injections on 2 occasions.    Following informed consent, the patient was brought to the operating room and placed in the prone position.  The back of the neck area was prepped in sterile fashion using chlorhexidine and draped with sterile drapes.  Using fluoroscopic guidance, the skin and subcutaneous tissues overlying needle trajectories were anesthetized with as total of 5 cc 0.5% lidocaine to the left of midline.  18-gauge radiofrequency needles with 5 mm active tips were then advanced under fluoroscopic guidance the appropriate target sites needle positions were confirmed in AP and lateral views.  Stimulation at 50 Hz resulted in paresthesia at currents below 0.5 V at all 3 levels.  Motor stimulation at 2 Hz to a current of 1.2 V did not result in any muscular contractions in the extremities. Thereafter, 1.5 cc of 2% lidocaine and was injected at each needle tip and radiofrequency lesioning was then carried out at 80° for 90 seconds ×2.  At the end of the procedure, the needles were removed and the patient was transferred to the recovery area in stable condition.  The patient will update us on symptom response and progress on outpatient basis.

## 2024-03-15 NOTE — H&P
HISTORY AND PHYSICAL    History Of Present Illness  Allen Vela is a 58 y.o. male presenting with chronic pain.  Here for  left cervical facet RFA    {pain procedure preop checklist:09183}    Past Medical History  Past Medical History:   Diagnosis Date    COPD (chronic obstructive pulmonary disease) (CMS/[facility])     Depression     Gout     Hypertension     Torn rotator cuff     Ulcer of abdomen wall (CMS/[facility])        Surgical History  No past surgical history on file.     Social History  He reports that he has quit smoking. His smoking use included cigarettes. He has a 15.00 pack-year smoking history. He has never used smokeless tobacco. He reports current alcohol use. He reports that he does not use drugs.    Family History  Family History   Problem Relation Name Age of Onset    COPD Mother      Heart failure Mother      Heart failure Father      Diabetes Maternal Grandmother          Allergies  Montelukast and Ace inhibitors    Review of Systems   12 point ROS done and negative except for the above.   Physical Exam     General: NAD, well groomed, well nourished  Eyes: Non-icteric sclera, EOMI  Ears, Nose, Mouth, and Throat: External ears and nose appear to be without deformity or rash. No lesions or masses noted. Hearing is grossly intact.   Neck: Trachea midline  Respiratory: Nonlabored breathing   Cardiovascular: No peripheral edema   Skin: No rashes or open lesions/ulcers identified on skin.    Last Recorded Vitals  There were no vitals taken for this visit.    Relevant Results  {If you would like to pull in Medications, type .meds     If you would like to pull in Lab results for the last 24 hours, type .[id number]    If you would like to pull in Imaging results, type .[id number] :99}         Assessment/Plan       Risks, benefits, alternatives discussed. All questions answered to the best of my ability. Patient agrees to proceed.   -We will proceed with planned procedure        Mukund Henson MD  Chronic Pain  Fellow  Case [facility]

## 2024-03-18 ENCOUNTER — TREATMENT (OUTPATIENT)
Dept: PHYSICAL THERAPY | Facility: CLINIC | Age: 59
End: 2024-03-18
Payer: COMMERCIAL

## 2024-03-18 ENCOUNTER — CLINICAL SUPPORT (OUTPATIENT)
Dept: SPORTS MEDICINE | Facility: CLINIC | Age: 59
End: 2024-03-18
Payer: COMMERCIAL

## 2024-03-18 DIAGNOSIS — G89.29 CHRONIC BILATERAL LOW BACK PAIN WITHOUT SCIATICA: ICD-10-CM

## 2024-03-18 DIAGNOSIS — M32.9 SYSTEMIC LUPUS ERYTHEMATOSUS, UNSPECIFIED SLE TYPE, UNSPECIFIED ORGAN INVOLVEMENT STATUS (MULTI): ICD-10-CM

## 2024-03-18 DIAGNOSIS — N18.30 STAGE 3 CHRONIC KIDNEY DISEASE, UNSPECIFIED WHETHER STAGE 3A OR 3B CKD (MULTI): ICD-10-CM

## 2024-03-18 DIAGNOSIS — M54.50 CHRONIC BILATERAL LOW BACK PAIN WITHOUT SCIATICA: ICD-10-CM

## 2024-03-18 DIAGNOSIS — I50.32 CHRONIC DIASTOLIC HEART FAILURE (MULTI): ICD-10-CM

## 2024-03-18 DIAGNOSIS — G89.29 CHRONIC NECK PAIN: Primary | ICD-10-CM

## 2024-03-18 DIAGNOSIS — F32.A DEPRESSIVE TYPE PSYCHOSIS: ICD-10-CM

## 2024-03-18 DIAGNOSIS — J44.9: ICD-10-CM

## 2024-03-18 DIAGNOSIS — M54.2 CHRONIC NECK PAIN: Primary | ICD-10-CM

## 2024-03-18 PROCEDURE — 97110 THERAPEUTIC EXERCISES: CPT | Mod: GP,CQ

## 2024-03-18 PROCEDURE — 97140 MANUAL THERAPY 1/> REGIONS: CPT | Mod: GP,CQ

## 2024-03-18 ASSESSMENT — PAIN SCALES - GENERAL: PAINLEVEL_OUTOF10: 7

## 2024-03-18 ASSESSMENT — PAIN - FUNCTIONAL ASSESSMENT: PAIN_FUNCTIONAL_ASSESSMENT: 0-10

## 2024-03-18 NOTE — PROGRESS NOTES
Physical Therapy    Physical Therapy Treatment    Patient Name: Allen Vela  MRN: [id number]  Today's Date: 3/18/2024  Time Calculation  Start Time: 0300  Stop Time: 0345  Time Calculation (min): 45 min  PT Therapeutic Procedures Time Entry  Manual Therapy Time Entry: 25  Therapeutic Exercise Time Entry: 20   Visit #6 of 15  Caresource: 60 units (15 visits) approved from 1/10/24 to 4/10/24.   Reason for PT: chronic cervical and left upper shoulder pain, lower back pain    Assessment:  Repeated exercises done without major issue. Significant left sided cervical tightness felt this session during manual, difficulty moving to left. Increased time spent manually stretching. Patient did have a slight pain reduction and less stiffness afterwards. Continue increased manual work to see if symptoms continue to gradually improve. No other major reported complaints or concerns.     Plan:  Skilled PT treatment to address chronic neck and left shoulder pain, chronic lower back pain, decreased mobility and weakness.     Current Problem  Primary Tx Dx: Chronic neck pain    1. Neurogenic claudication due to lumbar spinal stenosis        2. Chronic bilateral low back pain without sciatica  Follow Up In Physical Therapy      3. Chronic neck pain  Follow Up In Physical Therapy      General     Subjective    Arrives with 8/10 neck pain and 7/10 low back pain. Performs HEP once a day, has not had much relief from these exercises. Did have neck symptom relief for a day after having dry needling, symptoms returned after. Neck ablation this past Friday, currently having strong soreness in this region, no nerve related pain at this moment. Functional compactly test this morning. No other updates at the moment.     Objective     Treatments:  Therapeutic Exercise: x 20 min  MHP to neck and upper shoulder in supine during lumbar and cervical exercises  - seated trunk flexion with t-ball roll out. 5s x 10  - supine cervical rotation with  overpressure. 5s x 10 ea  - supine chin tuck/DNF strengthening 3s x 15  - seated scapular retractions x 20  - hooklying LTR 5s x 10 ea    Manual Therapy: 25 minutes  - manual UT and SCM stretching 3 x 20 sec each side  - STM/MFR left cervical and shoulder musculature, upper trap, paraspinals, levator, scalenes and right paraspinals    OP EDUCATION:  Goals:  Active       neck and low back pain       Oswestry       Start:  01/10/24    Expected End:  03/25/24       Improve oswestry to < 58%          lumbar AROM       Start:  01/10/24    Expected End:  03/25/24       Improve lumbar flexion and side flexion by 10% to improve function         Hip and core strength       Start:  01/10/24    Expected End:  03/25/24       Hip strength to 5/5 globally, improve core strength for greater stabilization and reduced spinal stress         cervical AROM       Start:  01/10/24    Expected End:  03/25/24       Increase cervical flexion and extension by > 10 deg, rotation > 10 deg, right SB > 6 deg.          Patient Stated Goal 1       Start:  01/10/24    Expected End:  03/25/24       Increase sitting and standing time with less neck and low back pain

## 2024-03-20 ENCOUNTER — OFFICE VISIT (OUTPATIENT)
Dept: RHEUMATOLOGY | Facility: CLINIC | Age: 59
End: 2024-03-20
Payer: COMMERCIAL

## 2024-03-20 VITALS
SYSTOLIC BLOOD PRESSURE: 132 MMHG | TEMPERATURE: 96.1 F | HEIGHT: 68 IN | BODY MASS INDEX: 41.37 KG/M2 | WEIGHT: 273 LBS | DIASTOLIC BLOOD PRESSURE: 82 MMHG | HEART RATE: 83 BPM

## 2024-03-20 DIAGNOSIS — M32.9 SYSTEMIC LUPUS ERYTHEMATOSUS, UNSPECIFIED SLE TYPE, UNSPECIFIED ORGAN INVOLVEMENT STATUS (MULTI): Primary | ICD-10-CM

## 2024-03-20 PROCEDURE — 3075F SYST BP GE 130 - 139MM HG: CPT | Performed by: INTERNAL MEDICINE

## 2024-03-20 PROCEDURE — 1036F TOBACCO NON-USER: CPT | Performed by: INTERNAL MEDICINE

## 2024-03-20 PROCEDURE — 99214 OFFICE O/P EST MOD 30 MIN: CPT | Performed by: INTERNAL MEDICINE

## 2024-03-20 PROCEDURE — 3079F DIAST BP 80-89 MM HG: CPT | Performed by: INTERNAL MEDICINE

## 2024-03-20 RX ORDER — IPRATROPIUM BROMIDE AND ALBUTEROL SULFATE 2.5; .5 MG/3ML; MG/3ML
3 SOLUTION RESPIRATORY (INHALATION) EVERY 6 HOURS
COMMUNITY
Start: 2023-12-05

## 2024-03-20 RX ORDER — GUAIFENESIN 600 MG/1
600 TABLET, EXTENDED RELEASE ORAL EVERY 12 HOURS PRN
COMMUNITY
Start: 2024-02-26

## 2024-03-20 RX ORDER — PREDNISONE 5 MG/1
10 TABLET ORAL DAILY
Qty: 30 TABLET | Refills: 0 | Status: SHIPPED | OUTPATIENT
Start: 2024-03-20 | End: 2024-04-04

## 2024-03-20 RX ORDER — COVID-19 MOLECULAR TEST ASSAY
KIT MISCELLANEOUS
COMMUNITY
Start: 2023-12-12 | End: 2024-05-01 | Stop reason: WASHOUT

## 2024-03-20 ASSESSMENT — PAIN SCALES - GENERAL: PAINLEVEL: 6

## 2024-03-20 NOTE — PROGRESS NOTES
Subjective   Patient ID: Allen Vela is a 58 y.o. male who presents for Follow-up.    HPI  57 yo male with lupus and CKD stage III  In 2009, he was diagnosed with SLE in [facility].  No known h/o nephritis  He used HCQ and PRD prn  He had fatigue, joint pain at that time, he does not remember any skin rashes.  Also, the patient reports left shoulder pain and he was diagnosed with left rotator cuff tendinitis.  He also endorses neck pain.  He is using HCQ twice daily, but his physician tapered down its dose 200 mg daily due to no f/u with Ophthalmologist.  His last eye exam was 5 years ago.    Interval history:  Today, he is doing well, no active lupus findings.  He denies any active joint disease or skin rashes.    DOMINIQUE 1/1280 pos  RNP 6.7  Sm/RNP >8  Chromatin 6.3  DNA 6    ROS  Joint pain in hands: negative   Joint swelling: negative  Morning stiffness and duration: negative   strength: normal  Oral ulcer: negative  Genital ulcer: negative  Raynaud phenomenon: negative  Chest pain/dyspnea: negative  Low back pain: negative  Visual problem: negative  Dry eyes/dry mouth: negative  Skin rash/scaling/psoriasis: negative       Objective     PEXAM  VS reviewed, WNL  General: Alert, no distress   HEENT: Normocephalic/atraumatic, No alopecia. PERRLA. Sclera white, conjunctiva pink, no malar rash. no oral or nasal ulcer. Oral cavity pink and moist, no erythema or exudate, dentition good.   Neck: supple  Respiratory: CTA B, no adventitious breath sounds  Cardiac: RRR, no murmurs, carotid, or bruits  Abdominal: symmetrical, soft, non-tender, non-distended, normoactive BSx4 quadrants, no CVA tenderness or suprapubic tenderness  MSK: Joints of upper and lower extremities were assessed for synovitis and ROM.    Today she has no evidence of synovitis in the joints of her hands or wrists, tender joint count 0, swollen joint count 0   Extremities: no clubbing, no cyanosis, no edema  Skin: Skin warm and moist.   Neuro:  non-focal, Strength 5/5 throughout. Normal gait. No cerebellar pathologic exam     Assessment/Plan   57 yo male with lupus and CKD stage III (not related to his SLE)  SLE dx in 2009 based on joint pain.  No nephritis or other major organ involvement  Currently, no active lupus findings.  PExam showed did not reveal any active joint disease     -will see lupus activation markers, urine and DOMINIQUE (no records in his chart)  -will see an Ophthalmologist, if eye exam is NL, will increase his HCQ to 400 mg daily  -will see him in 4 months

## 2024-03-25 ENCOUNTER — APPOINTMENT (OUTPATIENT)
Dept: PHYSICAL THERAPY | Facility: CLINIC | Age: 59
End: 2024-03-25
Payer: COMMERCIAL

## 2024-03-25 ENCOUNTER — HOSPITAL ENCOUNTER (OUTPATIENT)
Dept: RADIOLOGY | Facility: HOSPITAL | Age: 59
Discharge: HOME | End: 2024-03-25
Payer: COMMERCIAL

## 2024-03-25 DIAGNOSIS — M48.062 NEUROGENIC CLAUDICATION DUE TO LUMBAR SPINAL STENOSIS: ICD-10-CM

## 2024-03-25 PROCEDURE — 72141 MRI NECK SPINE W/O DYE: CPT | Performed by: RADIOLOGY

## 2024-03-25 PROCEDURE — 72148 MRI LUMBAR SPINE W/O DYE: CPT | Performed by: RADIOLOGY

## 2024-03-25 PROCEDURE — 72141 MRI NECK SPINE W/O DYE: CPT

## 2024-03-25 PROCEDURE — 72148 MRI LUMBAR SPINE W/O DYE: CPT

## 2024-03-28 ENCOUNTER — PATIENT MESSAGE (OUTPATIENT)
Dept: RHEUMATOLOGY | Facility: CLINIC | Age: 59
End: 2024-03-28
Payer: COMMERCIAL

## 2024-03-29 ENCOUNTER — APPOINTMENT (OUTPATIENT)
Dept: OPHTHALMOLOGY | Facility: CLINIC | Age: 59
End: 2024-03-29
Payer: COMMERCIAL

## 2024-04-01 ENCOUNTER — APPOINTMENT (OUTPATIENT)
Dept: PHYSICAL THERAPY | Facility: CLINIC | Age: 59
End: 2024-04-01
Payer: COMMERCIAL

## 2024-04-08 ENCOUNTER — APPOINTMENT (OUTPATIENT)
Dept: PHYSICAL THERAPY | Facility: CLINIC | Age: 59
End: 2024-04-08
Payer: COMMERCIAL

## 2024-04-08 ENCOUNTER — APPOINTMENT (OUTPATIENT)
Dept: PAIN MEDICINE | Facility: HOSPITAL | Age: 59
End: 2024-04-08
Payer: COMMERCIAL

## 2024-04-24 ENCOUNTER — OFFICE VISIT (OUTPATIENT)
Dept: PAIN MEDICINE | Facility: HOSPITAL | Age: 59
End: 2024-04-24
Payer: COMMERCIAL

## 2024-04-24 DIAGNOSIS — M47.817 LUMBOSACRAL SPONDYLOSIS WITHOUT MYELOPATHY: Primary | ICD-10-CM

## 2024-04-24 DIAGNOSIS — M48.062 LUMBAR STENOSIS WITH NEUROGENIC CLAUDICATION: ICD-10-CM

## 2024-04-24 DIAGNOSIS — G44.221 CHRONIC TENSION-TYPE HEADACHE, INTRACTABLE: ICD-10-CM

## 2024-04-24 PROCEDURE — 99214 OFFICE O/P EST MOD 30 MIN: CPT | Performed by: ANESTHESIOLOGY

## 2024-04-24 RX ORDER — TOPIRAMATE 25 MG/1
TABLET ORAL NIGHTLY
Qty: 70 TABLET | Refills: 0 | Status: SHIPPED | OUTPATIENT
Start: 2024-04-24 | End: 2024-05-31 | Stop reason: ALTCHOICE

## 2024-04-24 ASSESSMENT — PAIN - FUNCTIONAL ASSESSMENT: PAIN_FUNCTIONAL_ASSESSMENT: 0-10

## 2024-04-24 ASSESSMENT — PAIN SCALES - GENERAL: PAINLEVEL_OUTOF10: 6

## 2024-04-24 NOTE — PROGRESS NOTES
Pain Management Clinic Note     Chief Complaint: Neck pain      History Of Present Illness  Allen Vela is a 58 y.o. male with a past medical history of COPD, depression, gout, HTN, rotator cuff tear, cervicalgia and chronic neck pain with evaluation neck pain.  Patient was last seen in January 2024 for neck pain, at that time patient was referred to physical therapy and is scheduled for left-sided cervical medial branch diagnostic blocks, the patient's had both diagnostic blocks and had the radiofrequency ablation left C3-C4-C5 on March 15.  Patient is coming in today for follow-up evaluation     Patient states in the month since his RFA, his neck pain is 50 to 60% better.  He states he still has radicular pain down his left arm that is worse at night.  His pain is sharp in quality, and he describes it as electrical pulses down his left arm.  He  also has a torn rotator cuff in his left shoulder.  He is completed several months physical therapy in the shoulder with no improvement in the pain.  The patient also describes some low back pain.  The pain is aching in quality, and worsens with prolonged standing and walking improves with sitting.  There is no radiation down the legs, but he does develop a weakness and heaviness in his legs with prolonged walking but improves with sitting.  He is currently on duloxetine 60 mg help his pain, and he states that it can dull the pain.    Most recent imaging 03/25/24  MRI Cervical Spine:   IMPRESSION:  * Cervical spondylosis without canal stenosis or cord compression    MRI Lumbar Spine:  IMPRESSION:  * Lumbar spondylosis as described    Previous treatments include:   Medications: Duloxetine 60 mg,   Physical Therapy: Completed   Injections: L C3-C5 RFA    The pain causes significant stress in the patient's life, specifically interferes with general activity, mood, walking ability, ability to perform tasks at home and/or work.  Patient participates in physical therapy and  continues to perform physician directed exercises at home. Denies any bowel or bladder incontinence, saddle anesthesia, worsening pain, weakness or falls.     Past Medical History  He has a past medical history of COPD (chronic obstructive pulmonary disease) (Multi), Depression, Gout, Hypertension, Torn rotator cuff, and Ulcer of abdomen wall (Multi).    Surgical History  He has no past surgical history on file.     Social History  He reports that he has quit smoking. His smoking use included cigarettes. He has a 15 pack-year smoking history. He has never used smokeless tobacco. He reports current alcohol use. He reports that he does not use drugs.    Family History  Family History   Problem Relation Name Age of Onset    COPD Mother      Heart failure Mother      Heart failure Father      Diabetes Maternal Grandmother          Allergies  Montelukast and Ace inhibitors    Review of Symptoms:   Constitutional: Negative for chills, diaphoresis or fever  HENT: Negative for neck swelling  Eyes:.  Negative for eye pain  Respiratory:.  Negative for cough, shortness of breath or wheezing    Cardiovascular:.  Negative for chest pain or palpitations  Gastrointestinal:.  Negative for abdominal pain, nausea and vomiting  Genitourinary:.  Negative for urgency  Musculoskeletal:  Positive for back pain. Positive for joint pain. Denies falls within the past 3 months.  Skin: Negative for wounds or itching   Neurological: Negative for dizziness, seizures, loss of consciousness and weakness  Endo/Heme/Allergies: Does not bruise/bleed easily  Psychiatric/Behavioral: Negative for depression. The patient does not appear anxious.       Physical Exam  Constitutional:       General: He is not in acute distress.     Appearance: Normal appearance.   HENT:      Head: Normocephalic.   Eyes:      Extraocular Movements: Extraocular movements intact.      Conjunctiva/sclera: Conjunctivae normal.      Pupils: Pupils are equal, round, and reactive  to light.   Cardiovascular:      Rate and Rhythm: Normal rate and regular rhythm.   Pulmonary:      Effort: Pulmonary effort is normal. No respiratory distress.   Abdominal:      General: Bowel sounds are normal.      Palpations: Abdomen is soft.   Musculoskeletal:         General: Tenderness present. Normal range of motion.      Cervical back: Neck supple. No rigidity.      Right lower leg: No edema.      Left lower leg: No edema.   Lymphadenopathy:      Cervical: No cervical adenopathy.   Skin:     General: Skin is warm and dry.   Neurological:      General: No focal deficit present.      Mental Status: He is alert and oriented to person, place, and time.      Cranial Nerves: No cranial nerve deficit.   Psychiatric:         Mood and Affect: Mood normal.         Behavior: Behavior normal.        Advanced Exam   Inspection: No gross deformities, no surgical scars  Palpation: No tenderness of patient of cervical midline, cervical paraspinals  ROM: Normal range of motion of the cervical flexion, extension and rotation  Motor: 5/5 strength upper and lower extremities  Sensory: Negative for sensory abnormalities in upper and lower extremities  Reflexes: 2+ reflexes bilateral upper and lower extremities, negative Mike sign  Neck: Positive Spurling sign on the left       Last Recorded Vitals  There were no vitals taken for this visit.    Relevant Results  Current Outpatient Medications   Medication Instructions    allopurinol (ZYLOPRIM) 100 mg, oral, Daily    amLODIPine (NORVASC) 5 mg, oral, Daily    atenolol (TENORMIN) 100 mg, oral, Daily RT    atorvastatin (LIPITOR) 20 mg, oral, Daily RT    BinaxNOW COVID-19 Ag Self Test kit TEST AS DIRECTED TODAY    buPROPion XL (Wellbutrin XL) 150 mg 24 hr tablet     cholecalciferol (VITAMIN D-3) 2,000 Units, oral, Daily    DULoxetine (CYMBALTA) 60 mg, oral, Daily    esketamine (SPRAVATO) 42 mg, Each Nostril, 2 times weekly    furosemide (Lasix) 20 mg tablet 1 tablet, oral, Daily     guaiFENesin (MUCINEX) 600 mg, oral, Every 12 hours PRN    hydroxychloroquine (PLAQUENIL) 200 mg, oral, Daily RT    hydrOXYzine HCL (Atarax) 50 mg tablet     ipratropium-albuteroL (Duo-Neb) 0.5-2.5 mg/3 mL nebulizer solution 3 mL, inhalation, Every 6 hours    pantoprazole (ProtoNix) 40 mg EC tablet     spironolactone (Aldactone) 25 mg tablet     tiotropium-olodateroL (Stiolto Respimat) 2.5-2.5 mcg/actuation mist inhaler INHALE 2 INHALATIONS BY MOUTH EVERY DAY    traMADol (ULTRAM) 50 mg, oral, 4 times daily PRN    Ventolin HFA 90 mcg/actuation inhaler 2 puffs, inhalation, Every 6 hours PRN         MR cervical spine wo IV contrast 03/25/2024    Narrative  Interpreted By:  Sridhar Sharma,  STUDY:  MR CERVICAL SPINE WO IV CONTRAST;  3/25/2024 2:11 pm    INDICATION:  Signs/Symptoms:neck and back pain.    COMPARISON:  None.    ACCESSION NUMBER(S):  [id number]    ORDERING CLINICIAN:  KATERYNA GAN    TECHNIQUE:  The cervical spine was studied in the sagittal and axial planes  utilizing T1 and T2 weighted images.    FINDINGS:  The craniovertebral junction is normal. The cord is normal in size  and signal. The marrow signal is slightly heterogeneous. Serial axial  images reveal the following: C2/C3  There is normal alignment and vertebral body height. The disc space  is normal. There is no evidence of canal or foraminal narrowing.  There is no evidence of bulging or herniated disc. C3/C4  Asymmetrical uncovertebral joint hypertrophy or focal disc herniation  toward the right with focal right-sided foraminal narrowing. No  measurable canal stenosis. C4/C5  Bulging intervertebral disc and bilateral uncovertebral joint  hypertrophy. Flattening of the thecal sac without measurable central  canal stenosis. Severe bilateral foraminal narrowing. C5/C6  Asymmetrical uncovertebral joint hypertrophy and/or focal disc  herniation to the left of midline without canal or foraminal  narrowing C6/C7 Bilateral facet hypertrophy without  canal or  foraminal narrowing C7/T1  There is normal alignment and vertebral body height. The disc space  is normal. There is no evidence of canal or foraminal narrowing.  There is no evidence of bulging or herniated disc.    Impression  * Cervical spondylosis without canal stenosis or cord compression    MACRO:  none    Signed by: Sridhar Sharma 3/25/2024 3:46 PM  Dictation workstation:   [id number]      MR lumbar spine wo IV contrast 03/25/2024    Narrative  Interpreted By:  Sridhar Sharma,  STUDY:  MR LUMBAR SPINE WO IV CONTRAST;  3/25/2024 2:11 pm    INDICATION:  Signs/Symptoms:low back pain.    COMPARISON:  None.    ACCESSION NUMBER(S):  [id number]    ORDERING CLINICIAN:  KATERYNA GAN    TECHNIQUE:  The lumbar spine was studied in the sagital, axial and coronal planes  utiliing T1 and T2 weighted images.    FINDINGS:  There is slightly heterogeneous marrow signal throughout the lower  thoracic and lumbar spine most consistent with abundant fatty rests  and/or osseous hemangioma. Minimal discogenic marrow signal changes  is also suspected at T12/L1 and L1/L2. The conus and sacrum are  normal. Images at each interspace reveal the following:  L1/L2  There is normal alignment and vertebral body height. The disc space  is normal. There is no evidence of canal or foraminal narrowing.  There is no evidence of bulging or herniated disc. L2/L3  There is normal alignment and vertebral body height. The disc space  is normal. There is no evidence of canal or foraminal narrowing.  There is no evidence of bulging or herniated disc. L3/L4  Circumferential bulging intervertebral disc and bilateral facet  hypertrophy without canal stenosis. Mild/moderate bilateral foraminal  narrowing. L4/L5 Circumferential bulging intervertebral disc. Bilateral facet  hypertrophy. Flattening of the thecal sac which measures 8 mm in AP  dimension in the midline. Moderate/advanced foraminal narrowing worse  on the right than the left.  L5/S1  Bulging intervertebral disc and bilateral facet hypertrophy without  canal stenosis. Moderate bilateral foraminal narrowing.    Impression  * Lumbar spondylosis as described    MACRO:  none    Signed by: Sridhar Sharma 3/25/2024 2:21 PM  Dictation workstation:   [id number]      XR cervical spine 2-3 views 11/07/2023    Narrative  Interpreted By:  Maura Darling,  STUDY:  Cervical spine, 2 views.    INDICATION:  Signs/Symptoms:cervicalgia.    COMPARISON:  None.    ACCESSION NUMBER(S):  [id number]    ORDERING CLINICIAN:  ZAYRA SHABAZZ    FINDINGS:  Alignment is within normal limits.  Moderate spondylosis at C3-4 with disc height loss and osteophytes.  Mild spondylosis at C4-5, C5-6, and C6-7 with mild disc height loss  and small anterior osteophytes evident. Vertebral body heights are  preserved. Posterior elements are intact. Prevertebral soft tissues  are unremarkable. Bilateral carotid artery vascular calcifications  noted.    Impression  1. Moderate C3-4 degenerative changes. Mild degenerative changes from  C4-5 to C6-7.    MACRO:  None.    Signed by: Maura Darling 11/8/2023 1:08 PM  Dictation workstation:   [id number]     No image results found.       No diagnosis found.     ASSESSMENT/PLAN  Allen Vela is a 58 y.o. male with a past medical history of COPD, depression, gout, HTN, rotator cuff tear, cervicalgia and chronic neck pain presenting for evaluation neck pain following left-sided radiofrequency ablation to C3-C5.  Based on history, available imaging and physical exam, the patient's primary pain etiology in his shoulder is likely related to his rotator cuff tear.  He had an injection back in June which provided some relief, but has not had any definitive management of the shoulder.  Physical therapy has not helped his shoulder pain.  At this time we will refer to Dr. Whiteside for further evaluation of his shoulder.  For the patient's low back pain, there is signs of lumbar spinal  stenosis with neurogenic claudication.  His pain in his low back with associated weakness and numbness develops after activity as well as his imaging findings help point towards his diagnosis.  At this time we will proceed with L4-L5 interlaminar epidural steroid injection to help address the pain.  Additionally to address both the back pain and the radicular neck pain we will prescribe topiramate nighttime titration.  The patient will start at 25 mg daily, and will increase nighttime dose by 25 mg each week until the patient is taking 100 mg nightly.    Our plan is as follows:  - L4/L5 Interlaminar Epidural steroid injection  - Topiramate night time titration 25mg , increase by 25mg weekly until taking 100mg nightly   - Referral to Dr. Whiteside for further evaluation of rotator cuff   - Continue to participate in physical therapy as well as physician directed home exercises  - Continue pain medications as prescribed       Dirk Coyle MD

## 2024-04-30 ENCOUNTER — HOSPITAL ENCOUNTER (OUTPATIENT)
Dept: RADIOLOGY | Facility: HOSPITAL | Age: 59
Discharge: HOME | End: 2024-04-30
Payer: COMMERCIAL

## 2024-04-30 DIAGNOSIS — J42 CHRONIC BRONCHITIS, UNSPECIFIED CHRONIC BRONCHITIS TYPE (MULTI): ICD-10-CM

## 2024-04-30 PROCEDURE — 71250 CT THORAX DX C-: CPT | Performed by: RADIOLOGY

## 2024-04-30 PROCEDURE — 71250 CT THORAX DX C-: CPT

## 2024-05-01 ENCOUNTER — HOSPITAL ENCOUNTER (OUTPATIENT)
Dept: RADIOLOGY | Facility: HOSPITAL | Age: 59
End: 2024-05-01
Payer: COMMERCIAL

## 2024-05-01 ENCOUNTER — OFFICE VISIT (OUTPATIENT)
Dept: PULMONOLOGY | Facility: HOSPITAL | Age: 59
End: 2024-05-01
Payer: COMMERCIAL

## 2024-05-01 VITALS
BODY MASS INDEX: 41.05 KG/M2 | OXYGEN SATURATION: 96 % | DIASTOLIC BLOOD PRESSURE: 84 MMHG | HEART RATE: 75 BPM | SYSTOLIC BLOOD PRESSURE: 126 MMHG | RESPIRATION RATE: 20 BRPM | TEMPERATURE: 97.3 F | WEIGHT: 270 LBS

## 2024-05-01 DIAGNOSIS — R91.1 PULMONARY NODULE: Primary | ICD-10-CM

## 2024-05-01 DIAGNOSIS — J42 CHRONIC BRONCHITIS, UNSPECIFIED CHRONIC BRONCHITIS TYPE (MULTI): ICD-10-CM

## 2024-05-01 PROCEDURE — 3079F DIAST BP 80-89 MM HG: CPT | Performed by: INTERNAL MEDICINE

## 2024-05-01 PROCEDURE — 99213 OFFICE O/P EST LOW 20 MIN: CPT | Mod: GC | Performed by: INTERNAL MEDICINE

## 2024-05-01 PROCEDURE — 3074F SYST BP LT 130 MM HG: CPT | Performed by: INTERNAL MEDICINE

## 2024-05-01 PROCEDURE — 99213 OFFICE O/P EST LOW 20 MIN: CPT | Performed by: INTERNAL MEDICINE

## 2024-05-01 ASSESSMENT — PAIN SCALES - GENERAL: PAINLEVEL: 6

## 2024-05-01 NOTE — PROGRESS NOTES
Reason For Follow Up: COPD     History Of Present Illness  Allen Vela is a 58 y.o. male with Asthma, COPD (moderate obstruction on PFTs 11/2023), SLE (on Plaquenil), CKD, HTN, DLD who presents to pulmonary clinic for follow up.     Patient was last seen in clinic 11/2023 at which time he was reporting multiple hospitalizations for pneumonia so ICS-LABA changed to LAMA-LABA which he is tolerating well. Reports overall breathing is improved. Cough is improved, not productive. No additional hospitalizations for respiratory symptoms including pneumonia. He denies chest pain, orthopnea, PND. No significant GERD symptoms or allergic rhinitis. Reports snoring, has been told he stops breathing in his sleep by family members.      12 point ROS is negative except noted above     Past Medical History  Medical History        Past Medical History:   Diagnosis Date    COPD (chronic obstructive pulmonary disease) (CMS/[facility])      Depression      Gout      Hypertension      Torn rotator cuff      Ulcer of abdomen wall (CMS/[facility])            Surgical History  Surgical History   History reviewed. No pertinent surgical history.     Family History  Family History          Family History   Problem Relation Name Age of Onset    COPD Mother        Heart failure Mother        Heart failure Father        Diabetes Maternal Grandmother             Social History  Social History            Tobacco Use    Smoking status: Former       Packs/day: 0.50       Years: 30.00       Additional pack years: 0.00       Total pack years: 15.00       Types: Cigarettes    Smokeless tobacco: Never   Substance Use Topics    Alcohol use: Never       Comment: occassional drinker    Drug use: Never      Allergies  Montelukast and Ace inhibitors     Vital Signs  There were no vitals taken for this visit.    Oxygen Therapy  SpO2: 98 %     Physical Exam  Gen: Pleasant, no acute distress  HEENT: no gross abnormalities  CV: RRR  Lungs: CTAB  GI: soft, non tender, non  distended  Ext: no LE edema  Neuro: AOx3, motor and sensory grossly intact  Psych: appears appropriate      Relevant Results  PFTs 11/2023: +moderate obstruction, +BD response, gas trapping, TLC and DLCO wnl     CT Chest: formal read pending, new 7mm nodule in NILSA     Assessment/Plan   Mr Queen is a 59 yo man with PMHx s/f Asthma, COPD (no PFTs on record), SLE (on Plaquenil), CKD, HTN, DLD who presents to pulmonary clinic to follow up.      #COPD  -continue current regimen with Stiolto, Albuterol PRN  -referred to pulmonary rehab     #Lung cancer screening  -new nodule on today's CT  -will await radiology read for final recommendations for repeat    RTC in 3-6 months

## 2024-05-01 NOTE — PATIENT INSTRUCTIONS
Thanks for coming in today. Here is what we discussed:  -your COPD looks to be moderate based off your last breathing tests  -continue Stiolto, albuterol PRN  -you have a new pulmonary  nodule on your cat scan, once formally read by radiology I will contact you for timing of repeat scan  -return to clinic after next imaging, in 3-6 months

## 2024-05-06 ENCOUNTER — TELEPHONE (OUTPATIENT)
Dept: CARDIAC REHAB | Facility: CLINIC | Age: 59
End: 2024-05-06
Payer: COMMERCIAL

## 2024-05-06 NOTE — TELEPHONE ENCOUNTER
Pt unable to do at this time due to upcoming surgery. Pt will contact physician when he is able to do program.

## 2024-05-14 ENCOUNTER — HOSPITAL ENCOUNTER (OUTPATIENT)
Dept: RADIOLOGY | Facility: HOSPITAL | Age: 59
Discharge: HOME | End: 2024-05-14
Payer: COMMERCIAL

## 2024-05-14 VITALS
OXYGEN SATURATION: 98 % | HEIGHT: 68 IN | DIASTOLIC BLOOD PRESSURE: 76 MMHG | BODY MASS INDEX: 39.4 KG/M2 | TEMPERATURE: 98.5 F | HEART RATE: 92 BPM | WEIGHT: 260 LBS | SYSTOLIC BLOOD PRESSURE: 126 MMHG | RESPIRATION RATE: 16 BRPM

## 2024-05-14 DIAGNOSIS — M47.817 LUMBOSACRAL SPONDYLOSIS WITHOUT MYELOPATHY: ICD-10-CM

## 2024-05-14 PROCEDURE — 62323 NJX INTERLAMINAR LMBR/SAC: CPT | Performed by: ANESTHESIOLOGY

## 2024-05-14 ASSESSMENT — PAIN SCALES - GENERAL
PAINLEVEL_OUTOF10: 6
PAINLEVEL_OUTOF10: 0 - NO PAIN

## 2024-05-14 ASSESSMENT — PAIN - FUNCTIONAL ASSESSMENT
PAIN_FUNCTIONAL_ASSESSMENT: 0-10
PAIN_FUNCTIONAL_ASSESSMENT: 0-10

## 2024-05-14 NOTE — PROCEDURES
Pre-Op Diagnosis: Lumbar spinal stenosis with neurogenic claudication   Post-Procedure Diagnosis: Same as preop diagnosis  Procedure: L4/L5 interlaminar epidural steroid injection with fluoroscopic guidance   Surgeon: Hany Higuera MD, PhD   Resident/Fellow/Other Assistant: Khai Henson MD     Procedure Note:     Mr. Allen Vela is a 58 y.o. male presenting today for L4/5 interlaminar epidural steroid injection.  The patient has moderate central canal stenosis partly due to epidural lipomatosis at L4/5 with symptoms of neurogenic claudication.  After informed consent was obtained, the patient was brought to the operating room and placed in the prone position.  The back area was prepped and draped in the usual sterile fashion.  Using fluoroscopic guidance, the skin and subcutaneous tissue overlying needle trajectory to the L4-L5 interlaminar epidural space were anesthetized with a total of 2 mL 0.5% lidocaine in a left paraspinous approach.  An 18-gauge Tuohy needle was then advanced under fluoroscopic guidance with a left paraspinous approach into the L4-5 interlaminar epidural space. Entry of the epidural space was confirmed using loss of resistance technique with a glass syringe and 2 mL of air.  Injection of Iohexol contrast revealed appropriate spread without vascular uptake. Subsequently, 5 mL of 0.5% lidocaine and 40 mg methylprednisolone were injected.  The needle was removed.  The patient tolerated the procedure well.  He was then transferred to recovery room in stable condition. The patient will participate in physical therapy, update us on his response, follow up with us on outpatient basis as needed.

## 2024-05-14 NOTE — H&P
"HISTORY AND PHYSICAL    History Of Present Illness  Allen Vela is a 58 y.o. male presenting with chronic pain.  Here for Lumbar interlaminar epidural steroid injection    he denies any recent antibiotic use or infections, he denies any blood thinner use , and he denies contrast or local anesthetic allergies     Past Medical History  Past Medical History:   Diagnosis Date    COPD (chronic obstructive pulmonary disease) (Multi)     Depression     Gout     Hypertension     Torn rotator cuff     Ulcer of abdomen wall (Multi)        Surgical History  History reviewed. No pertinent surgical history.     Social History  He reports that he has quit smoking. His smoking use included cigarettes. He has a 15 pack-year smoking history. He has never used smokeless tobacco. He reports current alcohol use. He reports that he does not use drugs.    Family History  Family History   Problem Relation Name Age of Onset    COPD Mother      Heart failure Mother      Heart failure Father      Diabetes Maternal Grandmother          Allergies  Montelukast and Ace inhibitors    Review of Systems   12 point ROS done and negative except for the above.   Physical Exam     General: NAD, well groomed, well nourished  Eyes: Non-icteric sclera, EOMI  Ears, Nose, Mouth, and Throat: External ears and nose appear to be without deformity or rash. No lesions or masses noted. Hearing is grossly intact.   Neck: Trachea midline  Respiratory: Nonlabored breathing   Cardiovascular: No peripheral edema   Skin: No rashes or open lesions/ulcers identified on skin.    Last Recorded Vitals  Blood pressure 140/89, pulse 88, temperature 36.3 °C (97.4 °F), resp. rate 16, height 1.727 m (5' 8\"), weight 118 kg (260 lb), SpO2 97%.    Relevant Results           Assessment/Plan       Risks, benefits, alternatives discussed. All questions answered to the best of my ability. Patient agrees to proceed.   -We will proceed with planned procedure        Mukund Henson, " MD  Chronic Pain Fellow  [facility]

## 2024-05-24 ENCOUNTER — LAB (OUTPATIENT)
Dept: LAB | Facility: LAB | Age: 59
End: 2024-05-24
Payer: COMMERCIAL

## 2024-05-24 DIAGNOSIS — N18.30 CHRONIC KIDNEY DISEASE, STAGE 3 UNSPECIFIED (MULTI): Primary | ICD-10-CM

## 2024-05-24 LAB
ALBUMIN SERPL BCP-MCNC: 4.2 G/DL (ref 3.4–5)
ALP SERPL-CCNC: 62 U/L (ref 33–136)
ALT SERPL W P-5'-P-CCNC: 16 U/L (ref 10–52)
ANION GAP SERPL CALC-SCNC: 13 MMOL/L (ref 10–20)
AST SERPL W P-5'-P-CCNC: 16 U/L (ref 9–39)
BILIRUB SERPL-MCNC: 0.7 MG/DL (ref 0–1.2)
BUN SERPL-MCNC: 20 MG/DL (ref 6–23)
CALCIUM SERPL-MCNC: 9.4 MG/DL (ref 8.6–10.6)
CHLORIDE SERPL-SCNC: 109 MMOL/L (ref 98–107)
CO2 SERPL-SCNC: 22 MMOL/L (ref 21–32)
CREAT SERPL-MCNC: 2.24 MG/DL (ref 0.5–1.3)
EGFRCR SERPLBLD CKD-EPI 2021: 33 ML/MIN/1.73M*2
GLUCOSE SERPL-MCNC: 87 MG/DL (ref 74–99)
POTASSIUM SERPL-SCNC: 4.4 MMOL/L (ref 3.5–5.3)
PROT SERPL-MCNC: 6.9 G/DL (ref 6.4–8.2)
SODIUM SERPL-SCNC: 140 MMOL/L (ref 136–145)

## 2024-05-24 PROCEDURE — 80053 COMPREHEN METABOLIC PANEL: CPT

## 2024-05-24 PROCEDURE — 36415 COLL VENOUS BLD VENIPUNCTURE: CPT

## 2024-05-31 DIAGNOSIS — M48.062 LUMBAR STENOSIS WITH NEUROGENIC CLAUDICATION: Primary | ICD-10-CM

## 2024-05-31 DIAGNOSIS — M47.817 LUMBOSACRAL SPONDYLOSIS WITHOUT MYELOPATHY: Primary | ICD-10-CM

## 2024-06-02 RX ORDER — TOPIRAMATE 50 MG/1
50 TABLET, FILM COATED ORAL 2 TIMES DAILY
Qty: 60 TABLET | Refills: 2 | Status: SHIPPED | OUTPATIENT
Start: 2024-06-02 | End: 2024-08-31

## 2024-06-02 RX ORDER — TOPIRAMATE 50 MG/1
50 TABLET, FILM COATED ORAL 2 TIMES DAILY
Qty: 180 TABLET | Refills: 3 | Status: SHIPPED | OUTPATIENT
Start: 2024-06-02 | End: 2024-06-03 | Stop reason: SDUPTHER

## 2024-06-03 ENCOUNTER — TELEPHONE (OUTPATIENT)
Dept: PULMONOLOGY | Facility: HOSPITAL | Age: 59
End: 2024-06-03
Payer: COMMERCIAL

## 2024-06-03 DIAGNOSIS — M47.817 LUMBOSACRAL SPONDYLOSIS WITHOUT MYELOPATHY: ICD-10-CM

## 2024-06-03 DIAGNOSIS — J42 CHRONIC BRONCHITIS, UNSPECIFIED CHRONIC BRONCHITIS TYPE (MULTI): ICD-10-CM

## 2024-06-03 RX ORDER — TIOTROPIUM BROMIDE AND OLODATEROL 3.124; 2.736 UG/1; UG/1
2 SPRAY, METERED RESPIRATORY (INHALATION) DAILY
Qty: 4 G | Refills: 2 | Status: SHIPPED | OUTPATIENT
Start: 2024-06-03

## 2024-06-03 RX ORDER — TOPIRAMATE 50 MG/1
50 TABLET, FILM COATED ORAL 2 TIMES DAILY
Qty: 180 TABLET | Refills: 3 | Status: SHIPPED | OUTPATIENT
Start: 2024-06-03 | End: 2025-06-03

## 2024-06-03 NOTE — TELEPHONE ENCOUNTER
Pt reached out needing a refill on his Stiolto inhaler. Refill order placed and routed to Dr. Gaxiola to sign.

## 2024-06-04 ENCOUNTER — OFFICE VISIT (OUTPATIENT)
Dept: ORTHOPEDIC SURGERY | Facility: CLINIC | Age: 59
End: 2024-06-04
Payer: COMMERCIAL

## 2024-06-04 ENCOUNTER — PREP FOR PROCEDURE (OUTPATIENT)
Dept: ORTHOPEDICS | Facility: HOSPITAL | Age: 59
End: 2024-06-04

## 2024-06-04 ENCOUNTER — HOSPITAL ENCOUNTER (OUTPATIENT)
Dept: RADIOLOGY | Facility: CLINIC | Age: 59
Discharge: HOME | End: 2024-06-04
Payer: COMMERCIAL

## 2024-06-04 DIAGNOSIS — M25.512 LEFT SHOULDER PAIN, UNSPECIFIED CHRONICITY: ICD-10-CM

## 2024-06-04 DIAGNOSIS — M75.102 LEFT ROTATOR CUFF TEAR ARTHROPATHY: Primary | ICD-10-CM

## 2024-06-04 DIAGNOSIS — M12.812 LEFT ROTATOR CUFF TEAR ARTHROPATHY: Primary | ICD-10-CM

## 2024-06-04 PROCEDURE — 99214 OFFICE O/P EST MOD 30 MIN: CPT | Performed by: STUDENT IN AN ORGANIZED HEALTH CARE EDUCATION/TRAINING PROGRAM

## 2024-06-04 PROCEDURE — 73030 X-RAY EXAM OF SHOULDER: CPT | Mod: LEFT SIDE | Performed by: STUDENT IN AN ORGANIZED HEALTH CARE EDUCATION/TRAINING PROGRAM

## 2024-06-04 PROCEDURE — 73030 X-RAY EXAM OF SHOULDER: CPT | Mod: LT

## 2024-06-04 NOTE — PROGRESS NOTES
CHIEF COMPLAINT:   Chief Complaint   Patient presents with    Left Shoulder - Pain     History: 58 y.o. male presents to the office today for follow-up of his left shoulder.  We saw him about a year ago at this point.  He has known left cuff tear arthropathy.  He also has multiple medical issues including COPD, lupus, CKD, and hypertension.  He says that these are stable.  We last saw him we prescribed him physical therapy as well as gave a cortisone injection for his shoulder.  Says the injection helped for a few weeks, but it has now worn off.  He did go to more than 6 weeks of physical therapy.  He has unfortunately now at the point where the shoulder is very dysfunctional.  Very limited range of motion of the shoulder.  Significant pain and weakness.  Feels like the shoulder is interfering with his ability to enjoy life.  Wants to discuss other options.    Past medical history, past surgical history, medications, allergies, family history, social history, and review of systems were reviewed today.    A 12 point review of systems was negative other than as stated in the HPI.    Past Medical History:   Diagnosis Date    COPD (chronic obstructive pulmonary disease) (Multi)     Depression     Gout     Hypertension     Torn rotator cuff     Ulcer of abdomen wall (Multi)         Allergies   Allergen Reactions    Montelukast Shortness of breath     Makes breathing worse    Ace Inhibitors Swelling     Pt reports he developed lip swelling when he was given accupril in the past        No past surgical history on file.     Family History   Problem Relation Name Age of Onset    COPD Mother      Heart failure Mother      Heart failure Father      Diabetes Maternal Grandmother          Social History     Socioeconomic History    Marital status: Single     Spouse name: Not on file    Number of children: Not on file    Years of education: Not on file    Highest education level: Not on file   Occupational History    Not on file    Tobacco Use    Smoking status: Former     Current packs/day: 0.50     Average packs/day: 0.5 packs/day for 30.0 years (15.0 ttl pk-yrs)     Types: Cigarettes    Smokeless tobacco: Never   Vaping Use    Vaping status: Not on file   Substance and Sexual Activity    Alcohol use: Yes     Comment: occassional drinker    Drug use: Never    Sexual activity: Not on file   Other Topics Concern    Not on file   Social History Narrative    Not on file     Social Determinants of Health     Financial Resource Strain: High Risk (12/7/2022)    Received from [facility]    Overall Financial Resource Strain (CARDIA)     Difficulty of Paying Living Expenses: Very hard   Food Insecurity: Food Insecurity Present (11/1/2023)    Hunger Vital Sign     Worried About Running Out of Food in the Last Year: Often true     Ran Out of Food in the Last Year: Often true   Transportation Needs: Unmet Transportation Needs (12/7/2022)    Received from [facility]    PRAPARE - Transportation     Lack of Transportation (Medical): Yes     Lack of Transportation (Non-Medical): Yes   Physical Activity: Inactive (12/7/2022)    Received from [facility]    Exercise Vital Sign     Days of Exercise per Week: 0 days     Minutes of Exercise per Session: 0 min   Stress: Stress Concern Present (12/7/2022)    Received from [facility]    [place] [place] [employer] - Occupational Stress Questionnaire     Feeling of Stress : Very much   Social Connections: Socially Isolated (12/7/2022)    Received from [employer]    Social Connection and Isolation Panel [NHANES]     Frequency of Communication with Friends and Family: Once a week     Frequency of Social Gatherings with Friends and Family: Once a week     Attends Holiness Services: Never     Active Member of Clubs or Organizations: No     Attends Club or Organization Meetings: Patient declined     Marital Status: Never    Intimate Partner Violence: Not At Risk (12/7/2022)    Received from  [facility]    Humiliation, Afraid, Rape, and Kick questionnaire     Fear of Current or Ex-Partner: No     Emotionally Abused: No     Physically Abused: No     Sexually Abused: No   Housing Stability: High Risk (12/7/2022)    Received from [facility]    Housing Stability Vital Sign     Unable to Pay for Housing in the Last Year: Yes     Number of Places Lived in the Last Year: 1     Unstable Housing in the Last Year: No        CURRENT MEDICATIONS:   Current Outpatient Medications   Medication Sig Dispense Refill    allopurinol (Zyloprim) 100 mg tablet Take 1 tablet (100 mg) by mouth once daily.      amLODIPine (Norvasc) 5 mg tablet Take 1 tablet (5 mg) by mouth once daily.      atenolol (Tenormin) 100 mg tablet Take 1 tablet (100 mg) by mouth once daily.      atorvastatin (Lipitor) 20 mg tablet Take 1 tablet (20 mg) by mouth once daily.      buPROPion XL (Wellbutrin XL) 150 mg 24 hr tablet       cholecalciferol (Vitamin D-3) 50 mcg (2,000 unit) capsule Take 1 capsule (50 mcg) by mouth once daily.      DULoxetine (Cymbalta) 60 mg DR capsule Take 1 capsule (60 mg) by mouth once daily.      esketamine (Spravato) 2 sprays/28 mg per device spray,non-aerosol nasal spray Administer 42 mg into each nostril 2 times a week.      furosemide (Lasix) 20 mg tablet Take 1 tablet (20 mg) by mouth once daily.      guaiFENesin (Mucinex) 600 mg 12 hr tablet Take 1 tablet (600 mg) by mouth every 12 hours if needed.      hydroxychloroquine (Plaquenil) 200 mg tablet Take 1 tablet (200 mg) by mouth once daily.      hydrOXYzine HCL (Atarax) 50 mg tablet       ipratropium-albuteroL (Duo-Neb) 0.5-2.5 mg/3 mL nebulizer solution Inhale 3 mL every 6 hours.      pantoprazole (ProtoNix) 40 mg EC tablet       spironolactone (Aldactone) 25 mg tablet       tiotropium-olodateroL (Stiolto Respimat) 2.5-2.5 mcg/actuation mist inhaler Inhale 2 Inhalations once daily. 4 g 2    topiramate (Topamax) 50 mg tablet Take 1 tablet (50 mg) by mouth 2 times a  "day. 60 tablet 2    topiramate (Topamax) 50 mg tablet Take 1 tablet (50 mg) by mouth 2 times a day. 180 tablet 3    traMADol (Ultram) 50 mg tablet Take 1 tablet (50 mg) by mouth 4 times a day as needed.      Ventolin HFA 90 mcg/actuation inhaler Inhale 2 puffs every 6 hours if needed.       No current facility-administered medications for this visit.       Physical Examination:      3/20/2024     3:17 PM 5/1/2024     2:34 PM 5/14/2024     1:44 PM 5/14/2024     2:01 PM 5/14/2024     2:06 PM 5/14/2024     2:11 PM 5/14/2024     2:14 PM   Vitals   Systolic 132 126 140 122 148  126   Diastolic 82 84 89 107 102  76   Heart Rate 83 75 88 99 87 86 92   Temp 35.6 °C (96.1 °F) 36.3 °C (97.3 °F) 36.3 °C (97.4 °F)    36.9 °C (98.5 °F)   Resp  20 16 17 17 17 16   Height (in) 1.727 m (5' 8\")  1.727 m (5' 8\")       Weight (lb) 273 270 260       BMI 41.51 kg/m2 41.05 kg/m2 39.53 kg/m2       BSA (m2) 2.44 m2 2.42 m2 2.38 m2       Visit Report Report Report           There is no height or weight on file to calculate BMI.    Well-appearing, appears stated age, pleasant and cooperative, appropriate mood and behavior. Height and weight reviewed. Alert and oriented x3.  Auditory function intact.  No acute distress.  Intact ocular function, VALE, EOMI. Breathing is unlabored .  There is no evidence of jugular venous distension. Skin appearance is normal without evidence of rash or other lesions. 2+ radial pulses bilaterally, fingers pink and wwp, good capillary refill, no pitting edema. No appreciable lymphadenopathy in bilateral upper extremities. SILT throughout both upper extremities, median/radial/ulnar/musculocutaneous/axillary nerve motor and sensory intact (except for abnormalities noted in focused musculoskeletal exam section below).     Neck exam: Full range of motion of the neck in flexion/extension and rotational movements. No significant areas of tenderness to palpation in the neck.    On exam of bilateral upper extremities, " very limited range of motion of the left shoulder compared to the right.  On the left side is active forward flexion to 40 degrees, external rotation to neutral, internal rotation to the side.  On the right he is active forward flexion 160, external rotation 50, internal Tatian to T12.  Significant weakness with left rotator cuff strength testing compared to the right, 3 out of 5 supraspinatus strength, 4 out of 5 external rotation strength.  Passively I can achieve more range of motion of the left.    Imaging: New radiographs of the left shoulder performed today.  Personally interpreted by myself.  End-stage arthritis of the right shoulder with complete loss of glenohumeral joint space.  There is significant proximal humeral migration of the humeral head as well.    Assessment: Left cuff tear arthropathy    Plan: Patient's symptoms, exam and imaging findings are consistent with cuff tear arthropathy. We discussed the natural history of this.  We discussed that with cuff tear arthropathy, there is a large chronic rotator cuff tear, superior migration of the humerus, loss of cartilage on both the humerus and glenoid, bony erosion of the glenoid, and extra bone formation called osteophytes on the humerus and glenoid. Conservative treatment for cuff tear arthropathy is limited, and can include activity modification, injections and physical therapy. The final option would be to have a reverse shoulder replacement.     At this point the patient has failed conservative options for cuff tear arthropathy including physical therapy for more than 6 weeks and cortisone injection.  His cortisone injection has been more than 3 months ago, he has completed physical therapy recently without any benefits.  He has very limited range of motion and weakness of the shoulder today and imaging consistent with cuff tear arthropathy.  At this point he is a good candidate for reverse shoulder replacement.  Like to proceed with this.  He is  interested in outpatient arthroplasty and does have good social support.    The patient has failed conservative management. At this point, the patient is a candidate for surgery.  Patient is in agreement with this.  Risks and recovery after surgery were discussed.  The proposed procedure will be a reverse shoulder arthroplasty.  Risks of surgery include bleeding, infection, instability, neurovascular injury, persistent pain, implant failure, wound complications and possible need for further surgery. All surgeries that are performed under anesthesia also have the risks of DVTs, pulmonary embolism, potentially death. Per anesthesia's evaluation, the patient will have a nerve block, the risks of which the anesthesia team will discuss with the patient.   Patient voiced understanding of these risks and wished to proceed. Postoperative recovery involves being in a sling in the early phases, with progressive increases in range of motion and strengthening exercises over a period of a few months.  We did discuss that reverse shoulder replacement has an extensive recovery, usually around 3-4 months until they are getting their strength back and probably feeling around 75% at that time. It is really 1 year until their arm is feeling completely better.  We also discussed that it is common after a reverse shoulder replacement to have some limitations with reaching behind the back as well as having a chronic mild pain (around 1 out of 10) in the shoulder.  We also discussed that with this replacement, they likely will not achieve the full range of motion of the shoulder they have when they were younger, but the range of motion should be significantly improved for them to be able to reach overhead and perform daily activities.     The patient will need PATs which will also include a CBC, BMP, PT/INR and a full work up by the PAT team as well as anesthesia evaluation.  My office will work to get this scheduled. I will see them back  2 weeks after surgery.    Dragon software was used to dictate this note, please be aware that minor errors in transcription may be present.    Tanner Padilla MD    Shoulder/Elbow Surgery  [facility] [place]

## 2024-06-13 ENCOUNTER — CLINICAL SUPPORT (OUTPATIENT)
Dept: PREADMISSION TESTING | Facility: HOSPITAL | Age: 59
End: 2024-06-13
Payer: COMMERCIAL

## 2024-06-13 DIAGNOSIS — M75.102 LEFT ROTATOR CUFF TEAR ARTHROPATHY: ICD-10-CM

## 2024-06-13 DIAGNOSIS — M12.812 LEFT ROTATOR CUFF TEAR ARTHROPATHY: ICD-10-CM

## 2024-06-13 RX ORDER — ACETAMINOPHEN 500 MG
1000 TABLET ORAL EVERY 6 HOURS PRN
COMMUNITY

## 2024-06-13 RX ORDER — LOPERAMIDE HYDROCHLORIDE 2 MG/1
2 CAPSULE ORAL 4 TIMES DAILY PRN
COMMUNITY

## 2024-06-13 RX ORDER — BISMUTH SUBSALICYLATE 262 MG
1 TABLET,CHEWABLE ORAL DAILY
COMMUNITY

## 2024-06-14 ENCOUNTER — TELEPHONE (OUTPATIENT)
Dept: PULMONOLOGY | Facility: HOSPITAL | Age: 59
End: 2024-06-14
Payer: COMMERCIAL

## 2024-06-14 NOTE — TELEPHONE ENCOUNTER
Per Dr. Bal, since the patient's chest CT results showed new pulmonary nodules with possible infectious ideology, this nurse contacted the patient to see if he was experiencing any pulmonary symptoms.  Patient denies cough, wheezing, SOB, chest congestion,  fever, chills, and body aches.

## 2024-06-17 ENCOUNTER — APPOINTMENT (OUTPATIENT)
Dept: SLEEP MEDICINE | Facility: CLINIC | Age: 59
End: 2024-06-17
Payer: COMMERCIAL

## 2024-06-17 VITALS
OXYGEN SATURATION: 97 % | SYSTOLIC BLOOD PRESSURE: 100 MMHG | HEIGHT: 68 IN | DIASTOLIC BLOOD PRESSURE: 70 MMHG | BODY MASS INDEX: 39.4 KG/M2 | HEART RATE: 77 BPM | WEIGHT: 260 LBS

## 2024-06-17 DIAGNOSIS — G47.33 OBSTRUCTIVE SLEEP APNEA (ADULT) (PEDIATRIC): Primary | ICD-10-CM

## 2024-06-17 DIAGNOSIS — G47.19 EXCESSIVE DAYTIME SLEEPINESS: ICD-10-CM

## 2024-06-17 DIAGNOSIS — I10 ESSENTIAL HYPERTENSION: ICD-10-CM

## 2024-06-17 DIAGNOSIS — J42 CHRONIC BRONCHITIS, UNSPECIFIED CHRONIC BRONCHITIS TYPE (MULTI): ICD-10-CM

## 2024-06-17 DIAGNOSIS — G47.00 PERSISTENT DISORDER OF INITIATING OR MAINTAINING SLEEP: ICD-10-CM

## 2024-06-17 PROCEDURE — 3078F DIAST BP <80 MM HG: CPT | Performed by: PHYSICIAN ASSISTANT

## 2024-06-17 PROCEDURE — 3074F SYST BP LT 130 MM HG: CPT | Performed by: PHYSICIAN ASSISTANT

## 2024-06-17 PROCEDURE — 99214 OFFICE O/P EST MOD 30 MIN: CPT | Performed by: PHYSICIAN ASSISTANT

## 2024-06-17 PROCEDURE — 3008F BODY MASS INDEX DOCD: CPT | Performed by: PHYSICIAN ASSISTANT

## 2024-06-17 PROCEDURE — 1036F TOBACCO NON-USER: CPT | Performed by: PHYSICIAN ASSISTANT

## 2024-06-17 ASSESSMENT — PAIN SCALES - GENERAL: PAINLEVEL: 0-NO PAIN

## 2024-06-17 NOTE — PATIENT INSTRUCTIONS
Great seeing you today,    I am referring you to nutrition services. Try to focus on weight loss through dietary changes until they can see you. Weight of 230 lbs will qualify you for INSPIRE.    Let me know if you want to schedule a sleep study sooner and I will send in an order.    Follow up 3 months     Amrik Moreira PA-C    IMPORTANT PHONE NUMBERS     [phone] ([phone])  [employer] ([employer]): [phone]  For clinical questions and refilling prescriptions: [phone]  Yahaira Guevara (For Destiny/Lillie): P: [phone]

## 2024-06-17 NOTE — PROGRESS NOTES
"[facility]  New Visit Note        HISTORY OF PRESENT ILLNESS     The patient's referring provider is: Hannah Bal MD    HISTORY OF PRESENT ILLNESS   Allen Vela is a 58 y.o. male who presents to a [facility] for a sleep medicine evaluation with concerns of Narcolepsy, Unusual Behaviors During Sleep At Night (Parasomnia), Unrefreshing Sleep, Difficulties Staying Asleep, Sleep Apnea, Pap Intolerance, and Inspire - Initial.     PAST SLEEP HISTORY    Patient has the following sleep-related diagnoses and sleep study results: He thinks he has had 3 previous sleep studies in the past which did show sleep apnea he cannot recall severity these results not available today    He tried CPAP with different mask options and settings and was never able to tolerate it as he states he could not keep it on her hard time falling back to sleep with CPAP on.    Additionally had problems maintaining the machine include keeping it clean.    CURRENT HISTORY    On today's visit, the patient reports he snores, stops breathing at night, feels sleepy during the day, talks in his sleep and has vivid dreams.    Followed by pulm for lung nodules and COPD.    He is interested in INSPIRE.    Does have goal to lose weight. Has done \"low carb\" diet in past with some success.    His father had sleep apnea but could not tolerate CPAP and was changed to oxygen only    STOP  4  BANG 4    Sleep schedule  on weekdays / work days:  Usual Bedtime  12 a  Falls asleep around  1 a  Wake time  3 a    Sleep schedule  on weekends/non work days :  same    Naps:   no    Average sleep duration 6 hours/day    Preferred sleeping position: back, side    Sleep-related ROS:    Sleep Initiation: no problems going to sleep    Sleep Maintenance: wakes about 3 times per night avg    Recreational drug use  Smoking: quit 2010  Alcohol consumption: 5/week  Caffeine consumption:  occasional  Marijuana: " "never    ESS: 4      PHYSICAL EXAM     VITAL SIGNS: /70   Pulse 77   Ht 1.727 m (5' 8\")   Wt 118 kg (260 lb)   SpO2 97%   BMI 39.53 kg/m²      PREVIOUS WEIGHTS:  Wt Readings from Last 3 Encounters:   06/17/24 118 kg (260 lb)   05/14/24 118 kg (260 lb)   05/01/24 122 kg (270 lb)       PHYSICAL EXAM: GENERAL: alert oriented x 3 pleasant and cooperative no acute distress  MODIFIED MALLAMPATI SCORE: 3+  LATERAL PHARYNGEAL WALL: 2+  NECK EXAM: normal supple no adenopathy    RESULTS/DATA     Iron (ug/dL)   Date Value   03/05/2023 12 (L)     Iron Saturation (%)   Date Value   03/05/2023 5 (L)     TIBC (ug/dL)   Date Value   03/05/2023 224 (L)     Ferritin (ug/L)   Date Value   03/05/2023 59       ASSESSMENT/PLAN     Mr. Vela is a 58 y.o. male and was referred to the [facility] for the following issues:    OBSTRUCTIVE SLEEP APNEA / SLEEPINESS/ FREQUENT WAKING  -Has history of AMRITA and needs treatment - has failed CPAP multiple times despite trying various mask options and settings  -Discussed INSPIRE and OAT as options  -He is interested in INSPIRE  -in-lab sleep study recommended due to COPD and INSPIRE interest  -Needs to reach wt of 230 to meet BMI req    BMI>35  -Body mass index is 39.53 kg/m².  today  -With sufficient weight loss may no longer require treatment for AMRITA   -Encouraged him to restart low-carb diet  -Referral to nutrition services made    HYPERTENSION  BP Readings from Last 3 Encounters:   06/17/24 100/70   05/14/24 126/76   05/01/24 126/84      -Well controlled with current medications    Follow up 3 months        "

## 2024-06-19 ENCOUNTER — LAB (OUTPATIENT)
Dept: LAB | Facility: LAB | Age: 59
End: 2024-06-19
Payer: COMMERCIAL

## 2024-06-19 ENCOUNTER — DOCUMENTATION (OUTPATIENT)
Dept: PREADMISSION TESTING | Facility: HOSPITAL | Age: 59
End: 2024-06-19

## 2024-06-19 ENCOUNTER — PRE-ADMISSION TESTING (OUTPATIENT)
Dept: PREADMISSION TESTING | Facility: HOSPITAL | Age: 59
End: 2024-06-19
Payer: COMMERCIAL

## 2024-06-19 VITALS
BODY MASS INDEX: 41.76 KG/M2 | HEIGHT: 67 IN | RESPIRATION RATE: 16 BRPM | HEART RATE: 77 BPM | TEMPERATURE: 97.9 F | DIASTOLIC BLOOD PRESSURE: 94 MMHG | OXYGEN SATURATION: 97 % | SYSTOLIC BLOOD PRESSURE: 137 MMHG | WEIGHT: 266.1 LBS

## 2024-06-19 DIAGNOSIS — Z01.818 PREOP TESTING: ICD-10-CM

## 2024-06-19 DIAGNOSIS — R73.9 ELEVATED BLOOD SUGAR: ICD-10-CM

## 2024-06-19 DIAGNOSIS — Z01.818 PREOP TESTING: Primary | ICD-10-CM

## 2024-06-19 LAB
ANION GAP SERPL CALC-SCNC: 15 MMOL/L (ref 10–20)
BASOPHILS # BLD AUTO: 0.04 X10*3/UL (ref 0–0.1)
BASOPHILS NFR BLD AUTO: 1.1 %
BUN SERPL-MCNC: 24 MG/DL (ref 6–23)
CALCIUM SERPL-MCNC: 9.4 MG/DL (ref 8.6–10.3)
CHLORIDE SERPL-SCNC: 106 MMOL/L (ref 98–107)
CO2 SERPL-SCNC: 24 MMOL/L (ref 21–32)
CREAT SERPL-MCNC: 2.18 MG/DL (ref 0.5–1.3)
EGFRCR SERPLBLD CKD-EPI 2021: 34 ML/MIN/1.73M*2
EOSINOPHIL # BLD AUTO: 0.28 X10*3/UL (ref 0–0.7)
EOSINOPHIL NFR BLD AUTO: 7.7 %
ERYTHROCYTE [DISTWIDTH] IN BLOOD BY AUTOMATED COUNT: 14.5 % (ref 11.5–14.5)
EST. AVERAGE GLUCOSE BLD GHB EST-MCNC: 82 MG/DL
GLUCOSE SERPL-MCNC: 85 MG/DL (ref 74–99)
HBA1C MFR BLD: 4.5 %
HCT VFR BLD AUTO: 37.9 % (ref 41–52)
HGB BLD-MCNC: 12.5 G/DL (ref 13.5–17.5)
IMM GRANULOCYTES # BLD AUTO: 0.01 X10*3/UL (ref 0–0.7)
IMM GRANULOCYTES NFR BLD AUTO: 0.3 % (ref 0–0.9)
LYMPHOCYTES # BLD AUTO: 1.04 X10*3/UL (ref 1.2–4.8)
LYMPHOCYTES NFR BLD AUTO: 28.7 %
MCH RBC QN AUTO: 33.5 PG (ref 26–34)
MCHC RBC AUTO-ENTMCNC: 33 G/DL (ref 32–36)
MCV RBC AUTO: 102 FL (ref 80–100)
MONOCYTES # BLD AUTO: 0.76 X10*3/UL (ref 0.1–1)
MONOCYTES NFR BLD AUTO: 20.9 %
NEUTROPHILS # BLD AUTO: 1.5 X10*3/UL (ref 1.2–7.7)
NEUTROPHILS NFR BLD AUTO: 41.3 %
NRBC BLD-RTO: 0 /100 WBCS (ref 0–0)
PLATELET # BLD AUTO: 233 X10*3/UL (ref 150–450)
POTASSIUM SERPL-SCNC: 4.3 MMOL/L (ref 3.5–5.3)
RBC # BLD AUTO: 3.73 X10*6/UL (ref 4.5–5.9)
SODIUM SERPL-SCNC: 141 MMOL/L (ref 136–145)
WBC # BLD AUTO: 3.6 X10*3/UL (ref 4.4–11.3)

## 2024-06-19 PROCEDURE — 93005 ELECTROCARDIOGRAM TRACING: CPT | Performed by: PHYSICIAN ASSISTANT

## 2024-06-19 PROCEDURE — 85025 COMPLETE CBC W/AUTO DIFF WBC: CPT

## 2024-06-19 PROCEDURE — 36415 COLL VENOUS BLD VENIPUNCTURE: CPT

## 2024-06-19 PROCEDURE — 80048 BASIC METABOLIC PNL TOTAL CA: CPT

## 2024-06-19 PROCEDURE — 87081 CULTURE SCREEN ONLY: CPT | Mod: AHULAB | Performed by: PHYSICIAN ASSISTANT

## 2024-06-19 PROCEDURE — 83036 HEMOGLOBIN GLYCOSYLATED A1C: CPT

## 2024-06-19 RX ORDER — CHLORHEXIDINE GLUCONATE ORAL RINSE 1.2 MG/ML
SOLUTION DENTAL
Qty: 473 ML | Refills: 0 | Status: SHIPPED | OUTPATIENT
Start: 2024-06-19

## 2024-06-19 ASSESSMENT — ENCOUNTER SYMPTOMS
PSYCHIATRIC NEGATIVE: 1
ENDOCRINE NEGATIVE: 1
HEMATOLOGIC/LYMPHATIC NEGATIVE: 1
EYES NEGATIVE: 1
CARDIOVASCULAR NEGATIVE: 1
NEUROLOGICAL NEGATIVE: 1
CONSTITUTIONAL NEGATIVE: 1
ARTHRALGIAS: 1
RESPIRATORY NEGATIVE: 1
ALLERGIC/IMMUNOLOGIC NEGATIVE: 1
GASTROINTESTINAL NEGATIVE: 1

## 2024-06-19 NOTE — CPM/PAT NURSE NOTE
CPM/PAT Nurse Note      Name: Allen Vela (Allen Vela)  /Age: 1965/58 y.o.       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 Bal new nodule on today's CT -will await radiology read for final recommendations for repeat    Lupus (systemic lupus erythematosus) (Multi)     Dr. Manley 3/20/24 Currently, no active lupus findings.    Sleep apnea     unable to tolerate CPAP    Torn rotator cuff     Ulcer of abdomen wall (Multi)        Past Surgical History:   Procedure Laterality Date    COLONOSCOPY      LUMBAR EPIDURAL INJECTION  2024    WISDOM TOOTH EXTRACTION      as a teen       Patient  has no history on file for sexual activity.    Family History   Problem Relation Name Age of Onset    COPD Mother      Heart failure Mother      Heart failure Father      No Known Problems Sister      Lupus Sister          skin    No Known Problems Sister      No Known Problems Sister      No Known Problems Brother      Diabetes Maternal Grandmother         Allergies   Allergen Reactions    Montelukast Shortness of breath     Makes breathing worse    Ace Inhibitors Swelling     Pt reports he developed lip swelling when he was given accupril and lisinopril  in the past       Prior to Admission medications    Medication Sig Start Date End Date Taking? Authorizing Provider   acetaminophen (Tylenol) 500 mg tablet Take 2 tablets (1,000 mg) by mouth every 6 hours if needed for mild pain (1 - 3). Takes 2-3 tabs    Historical Provider, MD   allopurinol (Zyloprim) 100 mg tablet Take 1 tablet (100 mg) by mouth once daily.    Historical Provider, MD   amLODIPine (Norvasc) 5 mg tablet  Take 1 tablet (5 mg) by mouth once daily.    Historical Provider, MD   atenolol (Tenormin) 100 mg tablet Take 1 tablet (100 mg) by mouth once daily. 5/6/22   Historical Provider, MD   atorvastatin (Lipitor) 20 mg tablet Take 1 tablet (20 mg) by mouth once daily. 8/11/17   Historical Provider, MD   buPROPion XL (Wellbutrin XL) 150 mg 24 hr tablet 1 tablet (150 mg) once daily in the morning. 10/11/23   Historical Provider, MD   chlorhexidine (Peridex) 0.12 % solution 15 ml swish and spit for 30 seconds night prior to surgery and morning of surgery 6/19/24   Clarissa Neri PA-C   cholecalciferol (Vitamin D-3) 50 mcg (2,000 unit) capsule Take 1 capsule (50 mcg) by mouth once daily. 10/16/23   Historical Provider, MD   DULoxetine (Cymbalta) 60 mg DR capsule Take 1 capsule (60 mg) by mouth once daily. 10/16/23   Historical Provider, MD   esketamine (Spravato) 2 sprays/28 mg per device spray,non-aerosol nasal spray Administer 42 mg into each nostril 2 times a week.    Historical Provider, MD   furosemide (Lasix) 20 mg tablet Take 1 tablet (20 mg) by mouth once daily. 4/17/18   Historical Provider, MD   guaiFENesin (Mucinex) 600 mg 12 hr tablet Take 1 tablet (600 mg) by mouth every 12 hours if needed. 2/26/24   Historical Provider, MD   hydroxychloroquine (Plaquenil) 200 mg tablet Take 1 tablet (200 mg) by mouth once daily. 3/9/23   Historical Provider, MD   hydrOXYzine HCL (Atarax) 50 mg tablet Take 1 tablet (50 mg) by mouth once daily as needed. 9/26/23   Historical Provider, MD   ipratropium-albuteroL (Duo-Neb) 0.5-2.5 mg/3 mL nebulizer solution Inhale 3 mL every 6 hours if needed. 12/5/23   Historical Provider, MD   loperamide (Imodium) 2 mg capsule Take 1 capsule (2 mg) by mouth 4 times a day as needed for diarrhea.    Historical Provider, MD   multivitamin tablet Take 1 tablet by mouth once daily.    Historical Provider, MD   pantoprazole (ProtoNix) 40 mg EC tablet Take 1 tablet (40 mg) by mouth 2 times a day.  9/25/23   Historical Provider, MD   spironolactone (Aldactone) 25 mg tablet 1 tablet (25 mg) once daily. 10/13/23   Historical Provider, MD   tiotropium-olodateroL (Stiolto Respimat) 2.5-2.5 mcg/actuation mist inhaler Inhale 2 Inhalations once daily. 6/3/24   Noemi Gaxiola MD   topiramate (Topamax) 50 mg tablet Take 1 tablet (50 mg) by mouth 2 times a day.  Patient taking differently: Take 1 tablet (50 mg) by mouth once daily. 6/2/24 8/31/24  Hany Higuera MD PhD   traMADol (Ultram) 50 mg tablet Take 1 tablet (50 mg) by mouth 4 times a day as needed. 10/12/23   Historical Provider, MD   Ventolin HFA 90 mcg/actuation inhaler Inhale 2 puffs every 6 hours if needed.    Historical Provider, MD   topiramate (Topamax) 50 mg tablet Take 1 tablet (50 mg) by mouth 2 times a day. 6/3/24 6/19/24  Hany Higuera MD PhD        PAT ROS     DASI Risk Score    No data to display       Caprini DVT Assessment    No data to display       Modified Frailty Index    No data to display       CHADS2 Stroke Risk  Current as of 6 minutes ago        N/A 3 to 100%: High Risk   2 to < 3%: Medium Risk   0 to < 2%: Low Risk     Last Change: N/A          This score determines the patient's risk of having a stroke if the patient has atrial fibrillation.        This score is not applicable to this patient. Components are not calculated.          Revised Cardiac Risk Index    No data to display       Apfel Simplified Score    No data to display       Risk Analysis Index Results This Encounter    No data found in the last 1 encounters.         Nurse Plan of Action:     After Visit Summary (AVS) reviewed and patient verbalized good understanding of medications and NPO instructions.  Pre-op infection prevention measures:  CHG showers and mouthwash reviewed, understanding voiced.  CHG soap given and patient verbalized need to pick CHG mouthwash at their preferred local pharmacy.

## 2024-06-19 NOTE — H&P (VIEW-ONLY)
[facility]/PAT Evaluation       Name: Allen Vela (Allen Vela)  /Age: 1965/58 y.o.     In-Person       Chief Complaint: Left rotator cuff tear arthropathy     HPI    Date of Consult: 24    Referring Provider: Dr. Padilla     Surgery, Date, and Length: Left Shoulder Total Reverse Arthroplasty ; 24; 150 minutes     Allen Vela  is a 58 year-old male who presents to the [facility] for perioperative risk assessment prior to surgery. Patient is right hand dominant. Presented with left shoulder pain progressive over past year. He has unfortunately now at the point where the shoulder is very dysfunctional. Very limited range of motion of the shoulder. Significant pain and weakness. Feels like the shoulder is interfering with his ability to enjoy life. He did go to more than 6 weeks of physical therapy. History cortisone injection helped for a few weeks only.    This note was created in part upon personal review of patient's medical records.      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 Bal new nodule on today's CT -will await radiology read for final recommendations for repeat    Lupus (systemic lupus erythematosus) (Multi)     Dr. Manley 3/20/24 Currently, no active lupus findings.    Sleep apnea     unable to tolerate CPAP    Torn rotator cuff         STOP BANG = +AMRITA    Caprini =   7   (age, total joint, BMI>25)    Surgical History  Past Surgical History:   Procedure Laterality Date    COLONOSCOPY      LUMBAR EPIDURAL  INJECTION  2024    WISDOM TOOTH EXTRACTION      as a teen             Family history:  Family History   Problem Relation Name Age of Onset    COPD Mother      Heart failure Mother      Heart failure Father      No Known Problems Sister      Lupus Sister          skin    No Known Problems Sister      No Known Problems Sister      No Known Problems Brother      Diabetes Maternal Grandmother          Social history:  Social History     Socioeconomic History    Marital status: Single     Spouse name: Not on file    Number of children: Not on file    Years of education: Not on file    Highest education level: Not on file   Occupational History    Not on file   Tobacco Use    Smoking status: Former     Average packs/day: 0.5 packs/day for 20.0 years (10.0 ttl pk-yrs)     Types: Cigarettes     Start date:      Quit date:      Years since quittin.4    Smokeless tobacco: Never    Tobacco comments:     Vaped x ~1 year after quit smoking   Vaping Use    Vaping status: Former   Substance and Sexual Activity    Alcohol use: Yes     Comment: social drinker, 3 beers    Drug use: Never    Sexual activity: Not on file   Other Topics Concern    Not on file   Social History Narrative    Not on file     Social Determinants of Health     Financial Resource Strain: High Risk (2022)    Received from [employer]    Overall Financial Resource Strain (CARDIA)     Difficulty of Paying Living Expenses: Very hard   Food Insecurity: Food Insecurity Present (2023)    Hunger Vital Sign     Worried About Running Out of Food in the Last Year: Often true     Ran Out of Food in the Last Year: Often true   Transportation Needs: Unmet Transportation Needs (2022)    Received from [employer]    PRAPARE - Transportation     Lack of Transportation (Medical): Yes     Lack of Transportation (Non-Medical): Yes   Physical Activity: Inactive (2022)    Received from [employer]    Exercise Vital Sign     Days of Exercise  per Week: 0 days     Minutes of Exercise per Session: 0 min   Stress: Stress Concern Present (12/7/2022)    Received from [facility]    [employer] - Occupational Stress Questionnaire     Feeling of Stress : Very much   Social Connections: Socially Isolated (12/7/2022)    Received from [employer]    Social Connection and Isolation Panel [NHANES]     Frequency of Communication with Friends and Family: Once a week     Frequency of Social Gatherings with Friends and Family: Once a week     Attends Mosque Services: Never     Active Member of Clubs or Organizations: No     Attends Club or Organization Meetings: Patient declined     Marital Status: Never    Intimate Partner Violence: Not At Risk (12/7/2022)    Received from [employer]    Humiliation, Afraid, Rape, and Kick questionnaire     Fear of Current or Ex-Partner: No     Emotionally Abused: No     Physically Abused: No     Sexually Abused: No   Housing Stability: High Risk (12/7/2022)    Received from [employer]    Housing Stability Vital Sign     Unable to Pay for Housing in the Last Year: Yes     Number of Places Lived in the Last Year: 1     Unstable Housing in the Last Year: No          Current Outpatient Medications:     acetaminophen (Tylenol) 500 mg tablet, Take 2 tablets (1,000 mg) by mouth every 6 hours if needed for mild pain (1 - 3). Takes 2-3 tabs, Disp: , Rfl:     allopurinol (Zyloprim) 100 mg tablet, Take 1 tablet (100 mg) by mouth once daily., Disp: , Rfl:     amLODIPine (Norvasc) 5 mg tablet, Take 1 tablet (5 mg) by mouth once daily., Disp: , Rfl:     atenolol (Tenormin) 100 mg tablet, Take 1 tablet (100 mg) by mouth once daily., Disp: , Rfl:     atorvastatin (Lipitor) 20 mg tablet, Take 1 tablet (20 mg) by mouth once daily., Disp: , Rfl:     buPROPion XL (Wellbutrin XL) 150 mg 24 hr tablet, 1 tablet (150 mg) once daily in the morning., Disp: , Rfl:     cholecalciferol (Vitamin D-3) 50 mcg (2,000 unit)  capsule, Take 1 capsule (50 mcg) by mouth once daily., Disp: , Rfl:     DULoxetine (Cymbalta) 60 mg DR capsule, Take 1 capsule (60 mg) by mouth once daily., Disp: , Rfl:     esketamine (Spravato) 2 sprays/28 mg per device spray,non-aerosol nasal spray, Administer 42 mg into each nostril 2 times a week., Disp: , Rfl:     furosemide (Lasix) 20 mg tablet, Take 1 tablet (20 mg) by mouth once daily., Disp: , Rfl:     hydroxychloroquine (Plaquenil) 200 mg tablet, Take 1 tablet (200 mg) by mouth once daily., Disp: , Rfl:     hydrOXYzine HCL (Atarax) 50 mg tablet, Take 1 tablet (50 mg) by mouth once daily as needed., Disp: , Rfl:     loperamide (Imodium) 2 mg capsule, Take 1 capsule (2 mg) by mouth 4 times a day as needed for diarrhea., Disp: , Rfl:     multivitamin tablet, Take 1 tablet by mouth once daily., Disp: , Rfl:     pantoprazole (ProtoNix) 40 mg EC tablet, Take 1 tablet (40 mg) by mouth 2 times a day., Disp: , Rfl:     spironolactone (Aldactone) 25 mg tablet, 1 tablet (25 mg) once daily., Disp: , Rfl:     tiotropium-olodateroL (Stiolto Respimat) 2.5-2.5 mcg/actuation mist inhaler, Inhale 2 Inhalations once daily., Disp: 4 g, Rfl: 2    topiramate (Topamax) 50 mg tablet, Take 1 tablet (50 mg) by mouth 2 times a day. (Patient taking differently: Take 1 tablet (50 mg) by mouth once daily.), Disp: 60 tablet, Rfl: 2    traMADol (Ultram) 50 mg tablet, Take 1 tablet (50 mg) by mouth 4 times a day as needed., Disp: , Rfl:     Ventolin HFA 90 mcg/actuation inhaler, Inhale 2 puffs every 6 hours if needed., Disp: , Rfl:     chlorhexidine (Peridex) 0.12 % solution, 15 ml swish and spit for 30 seconds night prior to surgery and morning of surgery, Disp: 473 mL, Rfl: 0    guaiFENesin (Mucinex) 600 mg 12 hr tablet, Take 1 tablet (600 mg) by mouth every 12 hours if needed., Disp: , Rfl:     ipratropium-albuteroL (Duo-Neb) 0.5-2.5 mg/3 mL nebulizer solution, Inhale 3 mL every 6 hours if needed., Disp: , Rfl:          Visit  "Vitals  BP (!) 137/94   Pulse 77   Temp 36.6 °C (97.9 °F)   Resp 16   Ht 1.702 m (5' 7\")   Wt 121 kg (266 lb 1.5 oz)   SpO2 97%   BMI 41.68 kg/m²   Smoking Status Former   BSA 2.39 m²        Review of Systems   Constitutional: Negative.    HENT: Negative.     Eyes: Negative.         Glasses   Respiratory: Negative.     Cardiovascular: Negative.         METS 4   +stairs / walk with no chest pain - limited due to joint pains    Gastrointestinal: Negative.    Endocrine: Negative.    Genitourinary: Negative.    Musculoskeletal:  Positive for arthralgias.        Left shoulder pain / limited ROM   Skin: Negative.    Allergic/Immunologic: Negative.    Neurological: Negative.    Hematological: Negative.    Psychiatric/Behavioral: Negative.          Physical Exam  Constitutional:       Appearance: Normal appearance.   HENT:      Head: Normocephalic and atraumatic.      Right Ear: External ear normal.      Left Ear: External ear normal.      Nose: Nose normal.      Mouth/Throat:      Pharynx: Oropharynx is clear.   Eyes:      Conjunctiva/sclera: Conjunctivae normal.   Neck:      Comments: Decreased ROM  Cardiovascular:      Rate and Rhythm: Normal rate and regular rhythm.      Heart sounds: Normal heart sounds.   Pulmonary:      Effort: Pulmonary effort is normal.      Breath sounds: Normal breath sounds.   Abdominal:      General: Abdomen is flat.      Palpations: Abdomen is soft.   Musculoskeletal:      Comments: Decreased ROM left shoulder    Skin:     General: Skin is warm and dry.   Neurological:      General: No focal deficit present.      Mental Status: He is alert and oriented to person, place, and time.   Psychiatric:         Mood and Affect: Mood normal.         Behavior: Behavior normal.         Thought Content: Thought content normal.         Judgment: Judgment normal.          PAT AIRWAY:   Airway:     Mallampati::  III    Neck ROM::  Limited    Lab Results   Component Value Date    WBC 3.6 (L) 06/19/2024    HGB " 12.5 (L) 06/19/2024    HCT 37.9 (L) 06/19/2024     (H) 06/19/2024     06/19/2024      Lab Results   Component Value Date    GLUCOSE 85 06/19/2024    CALCIUM 9.4 06/19/2024     06/19/2024    K 4.3 06/19/2024    CO2 24 06/19/2024     06/19/2024    BUN 24 (H) 06/19/2024    CREATININE 2.18 (H) 06/19/2024      Lab Results   Component Value Date    HGBA1C 4.5 06/19/2024      EKG 6/19/24  NSR  Cannot r/o inferior  infarct, age undetermined  75 BPM     ECHO 3/2/23  CONCLUSIONS:  1. Left ventricular systolic function is normal with a 60-65% estimated ejection fraction.  2. Spectral Doppler shows a pseudonormal pattern of left ventricular diastolic filling.  3. Moderately elevated right ventricular systolic pressure.     RCRI  1  , 6 % Risk of MACE    Cardiac  HTN - furosemide HOLD DOS; continue atenolol, amlodipine and spironolactone DOS   HLD - continue atorvastatin DOS     Pulmonary  COPD- continue inhalers DOS     Renal  CKD - Recommendations to avoid nephrotoxic drugs and carefully monitor fluid status to maintain euvolemia. Use dose adjusted medications as needed for the underlying level of renal function.     LUPUS - continue Plaquenil DOS    Hematology       Patient instructed to ambulate as soon as possible postoperatively to decrease thromboembolic risk.      Initiate mechanical DVT prophylaxis as soon as possible and initiate chemical prophylaxis when deemed safe from a bleeding standpoint post surgery.       VTE prophylaxis per surgical team     GI  GERD - continue pantoprazole DOS     Tests ordered in PAT: cbc, bmp, A1c, mrsa, EKG   LABS REVIEWED: BUN 24 Cr 2.18  GFR 34 - consistent with previous values and known CKD     Risk assessment complete.  Patient is scheduled for a intermediate surgical risk procedure.        Preoperative medication instructions were provided and reviewed with the patient.  Any additional testing or evaluation was explained to the patient.  Nothing by mouth  instructions were discussed and patient's questions were answered prior to conclusion to this encounter.  Patient verbalized understanding of preoperative instructions given in preadmission testing; discharge instructions available in EMR.    This note was dictated by a speech recognition.  Minor errors may have been detected in a speech recognition.

## 2024-06-19 NOTE — PREPROCEDURE INSTRUCTIONS
Medication List            Accurate as of June 19, 2024  2:58 PM. Always use your most recent med list.                acetaminophen 500 mg tablet  Commonly known as: Tylenol  Notes to patient: Use if needed morning of surgery     allopurinol 100 mg tablet  Commonly known as: Zyloprim  Medication Adjustments for Surgery: Take morning of surgery with sip of water, no other fluids     amLODIPine 5 mg tablet  Commonly known as: Norvasc  Medication Adjustments for Surgery: Take morning of surgery with sip of water, no other fluids     atenolol 100 mg tablet  Commonly known as: Tenormin  Medication Adjustments for Surgery: Take morning of surgery with sip of water, no other fluids     atorvastatin 20 mg tablet  Commonly known as: Lipitor  Medication Adjustments for Surgery: Take morning of surgery with sip of water, no other fluids     buPROPion  mg 24 hr tablet  Commonly known as: Wellbutrin XL  Medication Adjustments for Surgery: Take morning of surgery with sip of water, no other fluids     cholecalciferol 50 mcg (2,000 unit) capsule  Commonly known as: Vitamin D-3  Medication Adjustments for Surgery: Continue until night before surgery     DULoxetine 60 mg DR capsule  Commonly known as: Cymbalta  Medication Adjustments for Surgery: Take morning of surgery with sip of water, no other fluids     esketamine 2 sprays/28 mg per device spray,non-aerosol nasal spray  Commonly known as: Spravato  Notes to patient: Use if needed morning of surgery     furosemide 20 mg tablet  Commonly known as: Lasix  Medication Adjustments for Surgery: Continue until night before surgery     guaiFENesin 600 mg 12 hr tablet  Commonly known as: Mucinex  Notes to patient: Use if needed morning of surgery     hydroxychloroquine 200 mg tablet  Commonly known as: Plaquenil  Medication Adjustments for Surgery: Take morning of surgery with sip of water, no other fluids     hydrOXYzine HCL 50 mg tablet  Commonly known as: Atarax  Notes to  patient: Use if needed morning of surgery     ipratropium-albuteroL 0.5-2.5 mg/3 mL nebulizer solution  Commonly known as: Duo-Neb  Notes to patient: Use morning of surgery     loperamide 2 mg capsule  Commonly known as: Imodium  Medication Adjustments for Surgery: Continue until night before surgery     multivitamin tablet  Medication Adjustments for Surgery: Stop 7 days before surgery     pantoprazole 40 mg EC tablet  Commonly known as: ProtoNix  Medication Adjustments for Surgery: Take morning of surgery with sip of water, no other fluids     spironolactone 25 mg tablet  Commonly known as: Aldactone  Medication Adjustments for Surgery: Take morning of surgery with sip of water, no other fluids     Stiolto Respimat 2.5-2.5 mcg/actuation mist inhaler  Generic drug: tiotropium-olodateroL  Inhale 2 Inhalations once daily.  Notes to patient: Use morning of surgery     topiramate 50 mg tablet  Commonly known as: Topamax  Take 1 tablet (50 mg) by mouth 2 times a day.  Medication Adjustments for Surgery: Take morning of surgery with sip of water, no other fluids     traMADol 50 mg tablet  Commonly known as: Ultram  Medication Adjustments for Surgery: Take morning of surgery with sip of water, no other fluids     Ventolin HFA 90 mcg/actuation inhaler  Generic drug: albuterol  Notes to patient: Use morning of surgery                 Concerning above medication instructions - If medication is normally taken at night continue normal schedule - do not take night prior and morning of surgery.     CONTACT SURGEON'S OFFICE IF YOU DEVELOP:  * Fever = 100.4 F   * New respiratory symptoms (e.g. cough, shortness of breath, respiratory distress, sore throat)  * Recent loss of taste or smell  *Flu like symptoms such as headache, fatigue or gastrointestinal symptoms  * You develop any open sores, shingles, burning or painful urination   AND/OR:  * You no longer wish to have the surgery.  * Any other personal circumstances change that  may lead to the need to cancel or defer this surgery.  *You were admitted to any hospital within one week of your planned procedure.    SMOKING:  *Quitting smoking can make a huge difference to your health and recovery from surgery.    *If you need help with quitting, call 9-557-QUIT-NOW.    THE DAY BEFORE SURGERY:  *Do not eat any food after midnight the night before surgery/procedure.   *You are permitted to drink clear liquids (i.e. water, black coffee/tea, (no milk or cream) apple juice, and electrolyte drinks (Gatorade)13.5 ounces up to 2 hours before your instructed arrival time to the hospital.  *You may chew gum until  2 hours before your surgery/procedure.    SURGICAL TIME  *You will be contacted between 2 p.m. and 6 p.m. the business day before your surgery with your arrival time.  *If you haven't received a call by 6pm, call [phone].  *Scheduled surgery times may change and you will be notified if this occurs-check your personal voicemail for any updates.    ON THE MORNING OF SURGERY:  *Wear comfortable, loose fitting clothing.   *Do not use moisturizers, creams, lotions or perfume.  *All jewelry and valuables should be left at home.  *Prosthetic devices such as contact lenses, hearing aids, dentures, eyelash extensions, hairpins and body piercing must be removed before surgery.    BRING WITH YOU:  *Photo ID and insurance card  *Current list of medicines and allergies  *Pacemaker/Defibrillator/Heart stent cards  *CPAP machine and mask  *Slings/splints/crutches  *Copy of your complete Advanced Directive/DHPOA-if applicable  *Neurostimulator implant remote    PARKING AND ARRIVAL:  *Check in at the Main Entrance desk and let them know you are here for surgery.  *You will be directed to the 2nd floor surgical waiting area.    AFTER OUTPATIENT SURGERY:  *A responsible adult MUST accompany you at the time of discharge and stay with you for 24 hours after your surgery.  *You may NOT drive yourself home  after surgery.  *You may use a taxi or ride sharing service ([employer], [employer]) to return home ONLY if you are accompanied by a friend or family member.  *Instructions for resuming your medications will be provided by your surgeon.      Home Preoperative Antibacterial Shower     What is a home preoperative antibacterial shower?  This shower is a way of cleaning the skin with a germ killing soap before surgery.  The soap contains chlorhexidine, commonly known as CHG.  CHG is a soap for your skin with germ killing ability.  Let your doctor know if you are allergic to chlorhexidine.    Why do I need to take a preoperative antibacterial shower?  Skin is not sterile.  It is best to try to make your skin as free of germs as possible before surgery.  Proper cleansing with a germ killing soap before surgery can lower the number of germs on your skin.  This helps to reduce the risk of infection at the surgical site.  Following the instructions listed below will help you prepare your skin for surgery.      How do I use the CHG skin cleanser?  Steps:  Begin using your CHG soap five days before your scheduled surgery on ________________________.    Days 1-4 Shower before bed:  Wash your face and genitals with your normal soap and rinse.    Wash and rinse your hair using the CHG soap. Rinse completely, do not condition your   Hair.          3.    Apply the CHG soap to a clean wet washcloth.  Turn the water off or move away                From the water spray to avoid premature rinsing of the CHG soap as you are applying.     4.   Lather your entire body from the neck down.  Do not use on your face or genitals.   Pay special attention to the area(s) where your incision(s) will be located unless they are on your face.  Avoid scrubbing your skin too hard.  The important point is to have the CHG soap sit on your skin for 3 minutes.    When the 3 minutes are up, turn on the water and rinse the CHG soap off your body completely.   Pat  yourself dry with a clean, freshly-laundered towel.  Dress in clean, freshly laundered night clothes.    Be sure to sleep with clean, freshly laundered sheets.  Day 5:  Last shower is the morning before surgery: Follow above Instructions.    NOTE:    *Hair extensions should be removed    *Keep CHG soap out of eyes and ear canals   *DO NOT wash with regular soap on your body after you have used the CHG        soap solution  *DO NOT apply powders, lotions, or perfume.  *Deodorant may be used days 1-4, BUT NOT the day of surgery    Who should I contact if I have any questions regarding the use of CHG soap?  Call the [facility], Preadmission Testing at [phone] if you have any questions.              Patient Information: Pre-Operative Infection Prevention Measures     Why did I have my nose, under my arms and groin swabbed?  The purpose of the swab is to identify Staphylococcus aureus inside your nose or on your skin.  The swab was sent to the laboratory for culture.  A positive swab/culture for Staphylococcus aureus is called colonization or carriage.      What is Staphylococcus aureus?  Staphylococcus aureus, also known as “staph”, is a germ found on the skin or in the nose of healthy people.  Sometimes Staphylococcus aureus can get into the body and cause an infection.  This can be minor (such as pimples, boils or other skin problems).  It might also be serious (such as blood infection, pneumonia or a surgical site infection).    What is Staphylococcus aureus colonization or carriage?  Colonization or carriage means that a person has the germ but is not sick from it.  These bacteria can be spread on the hands or when breathing or sneezing.    How is Staphylococcus aureus spread?  It is most often spread by close contact with a person or item that carries it.    What happens if my culture is positive for Staphylococcus aureus?  Your doctor/medical team will use this information to  guide any antibiotic treatment which may be necessary.  Regardless of the culture results, we will clean the inside of your nose with a betadine swab just before you have your surgery.      Will I get an infection if I have Staphylococcus aureus in my nose or on my skin?  Anyone can get an infection with Staphylococcus aureus.  However, the best way to reduce your risk of infection is to follow the instructions provided to you for the use of your CHG soap and dental rinse.        Who should I contact if I have any questions?  Call the [facility], Preadmission Testing at [phone] if you have any questions.             Patient Information: Oral/Dental Rinse  **This is a prescription; pick it up at your preferred local pharmacy **  What is oral/dental rinse?   It is a mouthwash. It is a way of cleaning the mouth with a germ killing solution before your surgery.  The solution contains chlorhexidine, commonly known as CHG.   It is used inside the mouth to kill a bacteria known as Staphylococcus aureus.  Let your doctor know if you are allergic to Chlorhexidine.    Why do I need to use CHG oral/dental rinse?  The CHG oral/dental rinse helps to kill a bacteria in your mouth known a Staphylococcus aureus.     This reduces the risk of infection at the surgical site.      Using your CHG oral/dental rinse  STEPS:  Use your CHG oral/dental rinse after you brush your teeth the night before (at bedtime) and the morning of your surgery.  Follow all directions on your prescription label.    Use the cap on the container to measure 15ml (fill cap to fill line)  Swish (gargle if you can) the mouthwash in your mouth for at least 30 seconds, (do not to swallow) spit out  After you use your CHG rinse, do not rinse your mouth with water, drink or eat.  Please refer to prescription label for the appropriate time to resume oral intake  Dental rinse comes in one size bottle: 473ml ~16oz.  You will have  leftover    rinse, discard after this use.    What side effects might I have using the CHG oral/dental rinse?  CHG rinse will stick to plaque on the teeth.  Brush and floss just before use.  Teeth brushing will help avoid staining of plaque during use.    Who should I contact if I have questions about the CHG oral/dental rinse?  Please call [facility], Preadmission Testing at [phone] if you have any questions

## 2024-06-19 NOTE — CPM/PAT H&P
[facility]/PAT Evaluation       Name: Allen Vela (Allen Vela)  /Age: 1965/58 y.o.     In-Person       Chief Complaint: Left rotator cuff tear arthropathy     HPI    Date of Consult: 24    Referring Provider: Dr. Padilla     Surgery, Date, and Length: Left Shoulder Total Reverse Arthroplasty ; 24; 150 minutes     Allen Vela  is a 58 year-old male who presents to the [facility] for perioperative risk assessment prior to surgery. Patient is right hand dominant. Presented with left shoulder pain progressive over past year. He has unfortunately now at the point where the shoulder is very dysfunctional. Very limited range of motion of the shoulder. Significant pain and weakness. Feels like the shoulder is interfering with his ability to enjoy life. He did go to more than 6 weeks of physical therapy. History cortisone injection helped for a few weeks only.    This note was created in part upon personal review of patient's medical records.      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 Bal new nodule on today's CT -will await radiology read for final recommendations for repeat    Lupus (systemic lupus erythematosus) (Multi)     Dr. Manley 3/20/24 Currently, no active lupus findings.    Sleep apnea     unable to tolerate CPAP    Torn rotator cuff         STOP BANG = +AMRITA    Caprini =   7   (age, total joint, BMI>25)    Surgical History  Past Surgical History:   Procedure Laterality Date    COLONOSCOPY      LUMBAR EPIDURAL  INJECTION  2024    WISDOM TOOTH EXTRACTION      as a teen             Family history:  Family History   Problem Relation Name Age of Onset    COPD Mother      Heart failure Mother      Heart failure Father      No Known Problems Sister      Lupus Sister          skin    No Known Problems Sister      No Known Problems Sister      No Known Problems Brother      Diabetes Maternal Grandmother          Social history:  Social History     Socioeconomic History    Marital status: Single     Spouse name: Not on file    Number of children: Not on file    Years of education: Not on file    Highest education level: Not on file   Occupational History    Not on file   Tobacco Use    Smoking status: Former     Average packs/day: 0.5 packs/day for 20.0 years (10.0 ttl pk-yrs)     Types: Cigarettes     Start date:      Quit date:      Years since quittin.4    Smokeless tobacco: Never    Tobacco comments:     Vaped x ~1 year after quit smoking   Vaping Use    Vaping status: Former   Substance and Sexual Activity    Alcohol use: Yes     Comment: social drinker, 3 beers    Drug use: Never    Sexual activity: Not on file   Other Topics Concern    Not on file   Social History Narrative    Not on file     Social Determinants of Health     Financial Resource Strain: High Risk (2022)    Received from [employer]    Overall Financial Resource Strain (CARDIA)     Difficulty of Paying Living Expenses: Very hard   Food Insecurity: Food Insecurity Present (2023)    Hunger Vital Sign     Worried About Running Out of Food in the Last Year: Often true     Ran Out of Food in the Last Year: Often true   Transportation Needs: Unmet Transportation Needs (2022)    Received from [employer]    PRAPARE - Transportation     Lack of Transportation (Medical): Yes     Lack of Transportation (Non-Medical): Yes   Physical Activity: Inactive (2022)    Received from [employer]    Exercise Vital Sign     Days of Exercise  per Week: 0 days     Minutes of Exercise per Session: 0 min   Stress: Stress Concern Present (12/7/2022)    Received from [employer]    [place] [employer] - Occupational Stress Questionnaire     Feeling of Stress : Very much   Social Connections: Socially Isolated (12/7/2022)    Received from [employer]    Social Connection and Isolation Panel [NHANES]     Frequency of Communication with Friends and Family: Once a week     Frequency of Social Gatherings with Friends and Family: Once a week     Attends Druze Services: Never     Active Member of Clubs or Organizations: No     Attends Club or Organization Meetings: Patient declined     Marital Status: Never    Intimate Partner Violence: Not At Risk (12/7/2022)    Received from [employer]    Humiliation, Afraid, Rape, and Kick questionnaire     Fear of Current or Ex-Partner: No     Emotionally Abused: No     Physically Abused: No     Sexually Abused: No   Housing Stability: High Risk (12/7/2022)    Received from [employer]    Housing Stability Vital Sign     Unable to Pay for Housing in the Last Year: Yes     Number of Places Lived in the Last Year: 1     Unstable Housing in the Last Year: No          Current Outpatient Medications:     acetaminophen (Tylenol) 500 mg tablet, Take 2 tablets (1,000 mg) by mouth every 6 hours if needed for mild pain (1 - 3). Takes 2-3 tabs, Disp: , Rfl:     allopurinol (Zyloprim) 100 mg tablet, Take 1 tablet (100 mg) by mouth once daily., Disp: , Rfl:     amLODIPine (Norvasc) 5 mg tablet, Take 1 tablet (5 mg) by mouth once daily., Disp: , Rfl:     atenolol (Tenormin) 100 mg tablet, Take 1 tablet (100 mg) by mouth once daily., Disp: , Rfl:     atorvastatin (Lipitor) 20 mg tablet, Take 1 tablet (20 mg) by mouth once daily., Disp: , Rfl:     buPROPion XL (Wellbutrin XL) 150 mg 24 hr tablet, 1 tablet (150 mg) once daily in the morning., Disp: , Rfl:     cholecalciferol (Vitamin D-3) 50 mcg (2,000 unit)  capsule, Take 1 capsule (50 mcg) by mouth once daily., Disp: , Rfl:     DULoxetine (Cymbalta) 60 mg DR capsule, Take 1 capsule (60 mg) by mouth once daily., Disp: , Rfl:     esketamine (Spravato) 2 sprays/28 mg per device spray,non-aerosol nasal spray, Administer 42 mg into each nostril 2 times a week., Disp: , Rfl:     furosemide (Lasix) 20 mg tablet, Take 1 tablet (20 mg) by mouth once daily., Disp: , Rfl:     hydroxychloroquine (Plaquenil) 200 mg tablet, Take 1 tablet (200 mg) by mouth once daily., Disp: , Rfl:     hydrOXYzine HCL (Atarax) 50 mg tablet, Take 1 tablet (50 mg) by mouth once daily as needed., Disp: , Rfl:     loperamide (Imodium) 2 mg capsule, Take 1 capsule (2 mg) by mouth 4 times a day as needed for diarrhea., Disp: , Rfl:     multivitamin tablet, Take 1 tablet by mouth once daily., Disp: , Rfl:     pantoprazole (ProtoNix) 40 mg EC tablet, Take 1 tablet (40 mg) by mouth 2 times a day., Disp: , Rfl:     spironolactone (Aldactone) 25 mg tablet, 1 tablet (25 mg) once daily., Disp: , Rfl:     tiotropium-olodateroL (Stiolto Respimat) 2.5-2.5 mcg/actuation mist inhaler, Inhale 2 Inhalations once daily., Disp: 4 g, Rfl: 2    topiramate (Topamax) 50 mg tablet, Take 1 tablet (50 mg) by mouth 2 times a day. (Patient taking differently: Take 1 tablet (50 mg) by mouth once daily.), Disp: 60 tablet, Rfl: 2    traMADol (Ultram) 50 mg tablet, Take 1 tablet (50 mg) by mouth 4 times a day as needed., Disp: , Rfl:     Ventolin HFA 90 mcg/actuation inhaler, Inhale 2 puffs every 6 hours if needed., Disp: , Rfl:     chlorhexidine (Peridex) 0.12 % solution, 15 ml swish and spit for 30 seconds night prior to surgery and morning of surgery, Disp: 473 mL, Rfl: 0    guaiFENesin (Mucinex) 600 mg 12 hr tablet, Take 1 tablet (600 mg) by mouth every 12 hours if needed., Disp: , Rfl:     ipratropium-albuteroL (Duo-Neb) 0.5-2.5 mg/3 mL nebulizer solution, Inhale 3 mL every 6 hours if needed., Disp: , Rfl:          Visit  "Vitals  BP (!) 137/94   Pulse 77   Temp 36.6 °C (97.9 °F)   Resp 16   Ht 1.702 m (5' 7\")   Wt 121 kg (266 lb 1.5 oz)   SpO2 97%   BMI 41.68 kg/m²   Smoking Status Former   BSA 2.39 m²        Review of Systems   Constitutional: Negative.    HENT: Negative.     Eyes: Negative.         Glasses   Respiratory: Negative.     Cardiovascular: Negative.         METS 4   +stairs / walk with no chest pain - limited due to joint pains    Gastrointestinal: Negative.    Endocrine: Negative.    Genitourinary: Negative.    Musculoskeletal:  Positive for arthralgias.        Left shoulder pain / limited ROM   Skin: Negative.    Allergic/Immunologic: Negative.    Neurological: Negative.    Hematological: Negative.    Psychiatric/Behavioral: Negative.          Physical Exam  Constitutional:       Appearance: Normal appearance.   HENT:      Head: Normocephalic and atraumatic.      Right Ear: External ear normal.      Left Ear: External ear normal.      Nose: Nose normal.      Mouth/Throat:      Pharynx: Oropharynx is clear.   Eyes:      Conjunctiva/sclera: Conjunctivae normal.   Neck:      Comments: Decreased ROM  Cardiovascular:      Rate and Rhythm: Normal rate and regular rhythm.      Heart sounds: Normal heart sounds.   Pulmonary:      Effort: Pulmonary effort is normal.      Breath sounds: Normal breath sounds.   Abdominal:      General: Abdomen is flat.      Palpations: Abdomen is soft.   Musculoskeletal:      Comments: Decreased ROM left shoulder    Skin:     General: Skin is warm and dry.   Neurological:      General: No focal deficit present.      Mental Status: He is alert and oriented to person, place, and time.   Psychiatric:         Mood and Affect: Mood normal.         Behavior: Behavior normal.         Thought Content: Thought content normal.         Judgment: Judgment normal.          PAT AIRWAY:   Airway:     Mallampati::  III    Neck ROM::  Limited    Lab Results   Component Value Date    WBC 3.6 (L) 06/19/2024    HGB " 12.5 (L) 06/19/2024    HCT 37.9 (L) 06/19/2024     (H) 06/19/2024     06/19/2024      Lab Results   Component Value Date    GLUCOSE 85 06/19/2024    CALCIUM 9.4 06/19/2024     06/19/2024    K 4.3 06/19/2024    CO2 24 06/19/2024     06/19/2024    BUN 24 (H) 06/19/2024    CREATININE 2.18 (H) 06/19/2024      Lab Results   Component Value Date    HGBA1C 4.5 06/19/2024      EKG 6/19/24  NSR  Cannot r/o inferior  infarct, age undetermined  77 BPM     ECHO 3/2/23  CONCLUSIONS:  1. Left ventricular systolic function is normal with a 60-65% estimated ejection fraction.  2. Spectral Doppler shows a pseudonormal pattern of left ventricular diastolic filling.  3. Moderately elevated right ventricular systolic pressure.     RCRI  1  , 6 % Risk of MACE    Cardiac  HTN - furosemide HOLD DOS; continue atenolol, amlodipine and spironolactone DOS   HLD - continue atorvastatin DOS     Pulmonary  COPD- continue inhalers DOS     Renal  CKD - Recommendations to avoid nephrotoxic drugs and carefully monitor fluid status to maintain euvolemia. Use dose adjusted medications as needed for the underlying level of renal function.     LUPUS - continue Plaquenil DOS    Hematology       Patient instructed to ambulate as soon as possible postoperatively to decrease thromboembolic risk.      Initiate mechanical DVT prophylaxis as soon as possible and initiate chemical prophylaxis when deemed safe from a bleeding standpoint post surgery.       VTE prophylaxis per surgical team     GI  GERD - continue pantoprazole DOS     Tests ordered in PAT: cbc, bmp, A1c, mrsa, EKG   LABS REVIEWED: BUN 24 Cr 2.18  GFR 34 - consistent with previous values and known CKD     Risk assessment complete.  Patient is scheduled for a intermediate surgical risk procedure.        Preoperative medication instructions were provided and reviewed with the patient.  Any additional testing or evaluation was explained to the patient.  Nothing by mouth  instructions were discussed and patient's questions were answered prior to conclusion to this encounter.  Patient verbalized understanding of preoperative instructions given in preadmission testing; discharge instructions available in EMR.    This note was dictated by a speech recognition.  Minor errors may have been detected in a speech recognition.

## 2024-06-20 LAB
ATRIAL RATE: 77 BPM
P AXIS: 87 DEGREES
P OFFSET: 173 MS
P ONSET: 129 MS
PR INTERVAL: 180 MS
Q ONSET: 219 MS
QRS COUNT: 13 BEATS
QRS DURATION: 86 MS
QT INTERVAL: 374 MS
QTC CALCULATION(BAZETT): 423 MS
QTC FREDERICIA: 406 MS
R AXIS: 7 DEGREES
T AXIS: 24 DEGREES
T OFFSET: 406 MS
VENTRICULAR RATE: 77 BPM

## 2024-06-21 LAB — STAPHYLOCOCCUS SPEC CULT: ABNORMAL

## 2024-06-26 ENCOUNTER — APPOINTMENT (OUTPATIENT)
Dept: OPHTHALMOLOGY | Facility: CLINIC | Age: 59
End: 2024-06-26
Payer: COMMERCIAL

## 2024-06-26 DIAGNOSIS — H52.4 PRESBYOPIA OF BOTH EYES: ICD-10-CM

## 2024-06-26 DIAGNOSIS — H02.889 MEIBOMIAN GLAND DYSFUNCTION: ICD-10-CM

## 2024-06-26 DIAGNOSIS — Z79.899 LONG-TERM USE OF PLAQUENIL: Primary | ICD-10-CM

## 2024-06-26 DIAGNOSIS — M32.9 SYSTEMIC LUPUS ERYTHEMATOSUS, UNSPECIFIED SLE TYPE, UNSPECIFIED ORGAN INVOLVEMENT STATUS (MULTI): ICD-10-CM

## 2024-06-26 DIAGNOSIS — Z79.899 LONG-TERM USE OF PLAQUENIL: ICD-10-CM

## 2024-06-26 PROCEDURE — 92004 COMPRE OPH EXAM NEW PT 1/>: CPT | Performed by: STUDENT IN AN ORGANIZED HEALTH CARE EDUCATION/TRAINING PROGRAM

## 2024-06-26 PROCEDURE — 92015 DETERMINE REFRACTIVE STATE: CPT | Performed by: STUDENT IN AN ORGANIZED HEALTH CARE EDUCATION/TRAINING PROGRAM

## 2024-06-26 PROCEDURE — 92083 EXTENDED VISUAL FIELD XM: CPT | Performed by: STUDENT IN AN ORGANIZED HEALTH CARE EDUCATION/TRAINING PROGRAM

## 2024-06-26 PROCEDURE — 92134 CPTRZ OPH DX IMG PST SGM RTA: CPT | Performed by: STUDENT IN AN ORGANIZED HEALTH CARE EDUCATION/TRAINING PROGRAM

## 2024-06-26 ASSESSMENT — ENCOUNTER SYMPTOMS
EYES NEGATIVE: 1
CARDIOVASCULAR NEGATIVE: 0
ENDOCRINE NEGATIVE: 0
GASTROINTESTINAL NEGATIVE: 0
ALLERGIC/IMMUNOLOGIC NEGATIVE: 0
NEUROLOGICAL NEGATIVE: 0
MUSCULOSKELETAL NEGATIVE: 0
RESPIRATORY NEGATIVE: 0
HEMATOLOGIC/LYMPHATIC NEGATIVE: 0
CONSTITUTIONAL NEGATIVE: 0
PSYCHIATRIC NEGATIVE: 0

## 2024-06-26 ASSESSMENT — CONF VISUAL FIELD
OD_SUPERIOR_NASAL_RESTRICTION: 0
OD_INFERIOR_NASAL_RESTRICTION: 0
OS_SUPERIOR_TEMPORAL_RESTRICTION: 0
OS_NORMAL: 1
OD_SUPERIOR_TEMPORAL_RESTRICTION: 0
OS_SUPERIOR_NASAL_RESTRICTION: 0
OS_INFERIOR_NASAL_RESTRICTION: 0
OS_INFERIOR_TEMPORAL_RESTRICTION: 0
OD_NORMAL: 1
METHOD: COUNTING FINGERS
OD_INFERIOR_TEMPORAL_RESTRICTION: 0

## 2024-06-26 ASSESSMENT — SLIT LAMP EXAM - LIDS
COMMENTS: NORMAL, MGD UL/LL
COMMENTS: NORMAL, MGD UL/LL

## 2024-06-26 ASSESSMENT — VISUAL ACUITY
METHOD: SNELLEN - LINEAR
OD_CC: 20/30
OS_CC: 20/30

## 2024-06-26 ASSESSMENT — REFRACTION_WEARINGRX
OS_SPHERE: +0.00
OS_ADD: +2.25
OS_CYLINDER: SPHERE
OD_SPHERE: +1.00
OD_ADD: +2.25
OD_CYLINDER: -0.50
OD_AXIS: 090

## 2024-06-26 ASSESSMENT — REFRACTION_MANIFEST
OD_CYLINDER: -0.25
OD_ADD: +2.25
OD_SPHERE: +0.50
OS_ADD: +2.25
METHOD_AUTOREFRACTION: 1
OS_SPHERE: +0.00
OD_AXIS: 035
OS_CYLINDER: SPHERE
OS_CYLINDER: -0.50
OD_CYLINDER: -0.50
OD_AXIS: 056
OS_SPHERE: PLANO
OS_AXIS: 042
OD_SPHERE: +0.50

## 2024-06-26 ASSESSMENT — CUP TO DISC RATIO
OD_RATIO: .35
OS_RATIO: .35

## 2024-06-26 ASSESSMENT — TONOMETRY
OS_IOP_MMHG: 19
IOP_METHOD: GOLDMANN APPLANATION
OD_IOP_MMHG: 19

## 2024-06-26 ASSESSMENT — EXTERNAL EXAM - LEFT EYE: OS_EXAM: NORMAL

## 2024-06-26 ASSESSMENT — EXTERNAL EXAM - RIGHT EYE: OD_EXAM: NORMAL

## 2024-06-26 NOTE — PROGRESS NOTES
Assessment/Plan   Diagnoses and all orders for this visit:  Long-term use of Plaquenil  -Hydroxychloroquine PO 200mg since 2009 for Lupus  -Studies reveal that the risk of maculopathy when taking Plaquenil is 0% (0-5 years), 1% (over 5 years), 2% (over 10 years), and 20% cumulative, or 4% annual incidence (over 20 years) respectively.  Annual testing with 10-2 threshold visual field perimetry, as well as spectral domain optical coherence tomography of the macula is recommended. No signs of plaquenil toxicity today od/os, macula OCT today, HVF 10-2 today.  monitor 1 year or sooner with any acute vision change. HVF 10-2 and OCT Mac at next comprehensive exam.  Meibomian gland dysfunction  -patient has been noticing tearing OU intermittently  -discussed beginning with warm compresses or nubia mask  Presbyopia of both eyes  -New spec rx released today per patient request. Ocular health wnl for age OU. Monitor 1 year or sooner prn. Refraction billed today.    RTC 1 year for annual with DFE, MRX, OCT MAC, HVF 10-2

## 2024-06-27 RX ORDER — HYDROXYCHLOROQUINE SULFATE 200 MG/1
200 TABLET, FILM COATED ORAL
Qty: 90 TABLET | Refills: 1 | Status: SHIPPED | OUTPATIENT
Start: 2024-06-27

## 2024-06-30 ENCOUNTER — ANESTHESIA EVENT (OUTPATIENT)
Dept: OPERATING ROOM | Facility: HOSPITAL | Age: 59
End: 2024-06-30
Payer: COMMERCIAL

## 2024-06-30 RX ORDER — TRANEXAMIC ACID 100 MG/ML
1000 INJECTION, SOLUTION INTRAVENOUS ONCE
Status: CANCELLED | OUTPATIENT
Start: 2024-06-30 | End: 2024-06-30

## 2024-07-01 ENCOUNTER — PHARMACY VISIT (OUTPATIENT)
Dept: PHARMACY | Facility: CLINIC | Age: 59
End: 2024-07-01
Payer: MEDICAID

## 2024-07-01 ENCOUNTER — HOSPITAL ENCOUNTER (OUTPATIENT)
Facility: HOSPITAL | Age: 59
Setting detail: OUTPATIENT SURGERY
Discharge: HOME | End: 2024-07-01
Attending: STUDENT IN AN ORGANIZED HEALTH CARE EDUCATION/TRAINING PROGRAM | Admitting: STUDENT IN AN ORGANIZED HEALTH CARE EDUCATION/TRAINING PROGRAM
Payer: COMMERCIAL

## 2024-07-01 ENCOUNTER — ANESTHESIA (OUTPATIENT)
Dept: OPERATING ROOM | Facility: HOSPITAL | Age: 59
End: 2024-07-01
Payer: COMMERCIAL

## 2024-07-01 ENCOUNTER — APPOINTMENT (OUTPATIENT)
Dept: RADIOLOGY | Facility: HOSPITAL | Age: 59
End: 2024-07-01
Payer: COMMERCIAL

## 2024-07-01 VITALS
OXYGEN SATURATION: 97 % | RESPIRATION RATE: 15 BRPM | HEART RATE: 79 BPM | WEIGHT: 266.76 LBS | HEIGHT: 67 IN | BODY MASS INDEX: 41.87 KG/M2 | SYSTOLIC BLOOD PRESSURE: 124 MMHG | TEMPERATURE: 97.3 F | DIASTOLIC BLOOD PRESSURE: 73 MMHG

## 2024-07-01 DIAGNOSIS — M12.812 LEFT ROTATOR CUFF TEAR ARTHROPATHY: Primary | ICD-10-CM

## 2024-07-01 DIAGNOSIS — M75.102 LEFT ROTATOR CUFF TEAR ARTHROPATHY: Primary | ICD-10-CM

## 2024-07-01 DIAGNOSIS — M67.922 TENDINOPATHY OF LEFT BICEPS TENDON: ICD-10-CM

## 2024-07-01 PROBLEM — F32.A DEPRESSION: Status: ACTIVE | Noted: 2024-07-01

## 2024-07-01 PROBLEM — N18.9 CHRONIC RENAL INSUFFICIENCY: Status: ACTIVE | Noted: 2024-07-01

## 2024-07-01 PROBLEM — K27.9 PUD (PEPTIC ULCER DISEASE): Status: ACTIVE | Noted: 2024-07-01

## 2024-07-01 PROCEDURE — 2500000005 HC RX 250 GENERAL PHARMACY W/O HCPCS

## 2024-07-01 PROCEDURE — 97535 SELF CARE MNGMENT TRAINING: CPT | Mod: GO | Performed by: OCCUPATIONAL THERAPIST

## 2024-07-01 PROCEDURE — 97110 THERAPEUTIC EXERCISES: CPT | Mod: GO | Performed by: OCCUPATIONAL THERAPIST

## 2024-07-01 PROCEDURE — 7100000010 HC PHASE TWO TIME - EACH INCREMENTAL 1 MINUTE: Performed by: STUDENT IN AN ORGANIZED HEALTH CARE EDUCATION/TRAINING PROGRAM

## 2024-07-01 PROCEDURE — A23472 PR RECONSTR TOTAL SHOULDER IMPLANT

## 2024-07-01 PROCEDURE — 73020 X-RAY EXAM OF SHOULDER: CPT | Mod: LT

## 2024-07-01 PROCEDURE — 23472 RECONSTRUCT SHOULDER JOINT: CPT | Performed by: STUDENT IN AN ORGANIZED HEALTH CARE EDUCATION/TRAINING PROGRAM

## 2024-07-01 PROCEDURE — 2500000005 HC RX 250 GENERAL PHARMACY W/O HCPCS: Performed by: ANESTHESIOLOGY

## 2024-07-01 PROCEDURE — 23430 REPAIR BICEPS TENDON: CPT | Performed by: STUDENT IN AN ORGANIZED HEALTH CARE EDUCATION/TRAINING PROGRAM

## 2024-07-01 PROCEDURE — 3600000005 HC OR TIME - INITIAL BASE CHARGE - PROCEDURE LEVEL FIVE: Performed by: STUDENT IN AN ORGANIZED HEALTH CARE EDUCATION/TRAINING PROGRAM

## 2024-07-01 PROCEDURE — RXMED WILLOW AMBULATORY MEDICATION CHARGE

## 2024-07-01 PROCEDURE — 7100000001 HC RECOVERY ROOM TIME - INITIAL BASE CHARGE: Performed by: STUDENT IN AN ORGANIZED HEALTH CARE EDUCATION/TRAINING PROGRAM

## 2024-07-01 PROCEDURE — 2780000003 HC OR 278 NO HCPCS: Performed by: STUDENT IN AN ORGANIZED HEALTH CARE EDUCATION/TRAINING PROGRAM

## 2024-07-01 PROCEDURE — P9045 ALBUMIN (HUMAN), 5%, 250 ML: HCPCS | Mod: JZ

## 2024-07-01 PROCEDURE — 2500000002 HC RX 250 W HCPCS SELF ADMINISTERED DRUGS (ALT 637 FOR MEDICARE OP, ALT 636 FOR OP/ED): Performed by: ANESTHESIOLOGY

## 2024-07-01 PROCEDURE — 2500000004 HC RX 250 GENERAL PHARMACY W/ HCPCS (ALT 636 FOR OP/ED): Performed by: ANESTHESIOLOGY

## 2024-07-01 PROCEDURE — 97165 OT EVAL LOW COMPLEX 30 MIN: CPT | Mod: GO | Performed by: OCCUPATIONAL THERAPIST

## 2024-07-01 PROCEDURE — 3600000010 HC OR TIME - EACH INCREMENTAL 1 MINUTE - PROCEDURE LEVEL FIVE: Performed by: STUDENT IN AN ORGANIZED HEALTH CARE EDUCATION/TRAINING PROGRAM

## 2024-07-01 PROCEDURE — A9999 DME SUPPLY OR ACCESSORY, NOS: HCPCS | Performed by: STUDENT IN AN ORGANIZED HEALTH CARE EDUCATION/TRAINING PROGRAM

## 2024-07-01 PROCEDURE — C1776 JOINT DEVICE (IMPLANTABLE): HCPCS | Performed by: STUDENT IN AN ORGANIZED HEALTH CARE EDUCATION/TRAINING PROGRAM

## 2024-07-01 PROCEDURE — 64415 NJX AA&/STRD BRCH PLXS IMG: CPT | Performed by: ANESTHESIOLOGY

## 2024-07-01 PROCEDURE — C1713 ANCHOR/SCREW BN/BN,TIS/BN: HCPCS | Performed by: STUDENT IN AN ORGANIZED HEALTH CARE EDUCATION/TRAINING PROGRAM

## 2024-07-01 PROCEDURE — 3700000002 HC GENERAL ANESTHESIA TIME - EACH INCREMENTAL 1 MINUTE: Performed by: STUDENT IN AN ORGANIZED HEALTH CARE EDUCATION/TRAINING PROGRAM

## 2024-07-01 PROCEDURE — 3700000001 HC GENERAL ANESTHESIA TIME - INITIAL BASE CHARGE: Performed by: STUDENT IN AN ORGANIZED HEALTH CARE EDUCATION/TRAINING PROGRAM

## 2024-07-01 PROCEDURE — 2720000007 HC OR 272 NO HCPCS: Performed by: STUDENT IN AN ORGANIZED HEALTH CARE EDUCATION/TRAINING PROGRAM

## 2024-07-01 PROCEDURE — 2500000004 HC RX 250 GENERAL PHARMACY W/ HCPCS (ALT 636 FOR OP/ED)

## 2024-07-01 PROCEDURE — 7100000002 HC RECOVERY ROOM TIME - EACH INCREMENTAL 1 MINUTE: Performed by: STUDENT IN AN ORGANIZED HEALTH CARE EDUCATION/TRAINING PROGRAM

## 2024-07-01 PROCEDURE — 2500000004 HC RX 250 GENERAL PHARMACY W/ HCPCS (ALT 636 FOR OP/ED): Performed by: STUDENT IN AN ORGANIZED HEALTH CARE EDUCATION/TRAINING PROGRAM

## 2024-07-01 PROCEDURE — 73020 X-RAY EXAM OF SHOULDER: CPT | Mod: LEFT SIDE | Performed by: RADIOLOGY

## 2024-07-01 PROCEDURE — A6213 FOAM DRG >16<=48 SQ IN W/BDR: HCPCS | Performed by: STUDENT IN AN ORGANIZED HEALTH CARE EDUCATION/TRAINING PROGRAM

## 2024-07-01 PROCEDURE — A23472 PR RECONSTR TOTAL SHOULDER IMPLANT: Performed by: ANESTHESIOLOGY

## 2024-07-01 PROCEDURE — 7100000009 HC PHASE TWO TIME - INITIAL BASE CHARGE: Performed by: STUDENT IN AN ORGANIZED HEALTH CARE EDUCATION/TRAINING PROGRAM

## 2024-07-01 DEVICE — IMPLANTABLE DEVICE: Type: IMPLANTABLE DEVICE | Site: SHOULDER | Status: FUNCTIONAL

## 2024-07-01 DEVICE — GLENOID, HEAD W/RETAINING SCREW, RSP, 36MM, NEUTRAL
Type: IMPLANTABLE DEVICE | Site: SHOULDER | Status: FUNCTIONAL
Brand: DJO SURGICAL

## 2024-07-01 DEVICE — RSP BASEPLATE, 30MM, W/P2 COATING
Type: IMPLANTABLE DEVICE | Site: SHOULDER | Status: FUNCTIONAL
Brand: DJO SURGICAL

## 2024-07-01 DEVICE — SCREW, LOCKING BONE, RSP, 5 X 26MM: Type: IMPLANTABLE DEVICE | Site: SHOULDER | Status: FUNCTIONAL

## 2024-07-01 DEVICE — ALTIVATE REVERSE, HUMERAL STEM, SMALL SHELL, SZ 6X108MM
Type: IMPLANTABLE DEVICE | Site: SHOULDER | Status: FUNCTIONAL
Brand: DJO SURGICAL

## 2024-07-01 RX ORDER — HYDRALAZINE HYDROCHLORIDE 20 MG/ML
5 INJECTION INTRAMUSCULAR; INTRAVENOUS EVERY 30 MIN PRN
Status: DISCONTINUED | OUTPATIENT
Start: 2024-07-01 | End: 2024-07-01 | Stop reason: HOSPADM

## 2024-07-01 RX ORDER — SODIUM CHLORIDE, SODIUM LACTATE, POTASSIUM CHLORIDE, CALCIUM CHLORIDE 600; 310; 30; 20 MG/100ML; MG/100ML; MG/100ML; MG/100ML
100 INJECTION, SOLUTION INTRAVENOUS CONTINUOUS
Status: DISCONTINUED | OUTPATIENT
Start: 2024-07-01 | End: 2024-07-01 | Stop reason: HOSPADM

## 2024-07-01 RX ORDER — PHENYLEPHRINE HCL IN 0.9% NACL 1 MG/10 ML
SYRINGE (ML) INTRAVENOUS AS NEEDED
Status: DISCONTINUED | OUTPATIENT
Start: 2024-07-01 | End: 2024-07-01

## 2024-07-01 RX ORDER — OXYCODONE HYDROCHLORIDE 5 MG/1
5 TABLET ORAL EVERY 6 HOURS PRN
Qty: 28 TABLET | Refills: 0 | Status: SHIPPED | OUTPATIENT
Start: 2024-07-01 | End: 2024-07-08

## 2024-07-01 RX ORDER — TRANEXAMIC ACID 100 MG/ML
INJECTION, SOLUTION INTRAVENOUS AS NEEDED
Status: DISCONTINUED | OUTPATIENT
Start: 2024-07-01 | End: 2024-07-01

## 2024-07-01 RX ORDER — ONDANSETRON HYDROCHLORIDE 2 MG/ML
4 INJECTION, SOLUTION INTRAVENOUS ONCE AS NEEDED
Status: DISCONTINUED | OUTPATIENT
Start: 2024-07-01 | End: 2024-07-01 | Stop reason: HOSPADM

## 2024-07-01 RX ORDER — ONDANSETRON HYDROCHLORIDE 2 MG/ML
INJECTION, SOLUTION INTRAVENOUS AS NEEDED
Status: DISCONTINUED | OUTPATIENT
Start: 2024-07-01 | End: 2024-07-01

## 2024-07-01 RX ORDER — PHENYLEPHRINE HCL IN 0.9% NACL 0.4MG/10ML
SYRINGE (ML) INTRAVENOUS AS NEEDED
Status: DISCONTINUED | OUTPATIENT
Start: 2024-07-01 | End: 2024-07-01

## 2024-07-01 RX ORDER — CEFAZOLIN 1 G/1
INJECTION, POWDER, FOR SOLUTION INTRAVENOUS AS NEEDED
Status: DISCONTINUED | OUTPATIENT
Start: 2024-07-01 | End: 2024-07-01

## 2024-07-01 RX ORDER — ASPIRIN 81 MG/1
81 TABLET ORAL DAILY
Qty: 14 TABLET | Refills: 0 | Status: SHIPPED | OUTPATIENT
Start: 2024-07-01 | End: 2024-07-15

## 2024-07-01 RX ORDER — DOCUSATE SODIUM 100 MG/1
100 CAPSULE, LIQUID FILLED ORAL 2 TIMES DAILY
Qty: 60 CAPSULE | Refills: 0 | Status: SHIPPED | OUTPATIENT
Start: 2024-07-01 | End: 2024-07-31

## 2024-07-01 RX ORDER — ALBUTEROL SULFATE 0.83 MG/ML
2.5 SOLUTION RESPIRATORY (INHALATION) ONCE AS NEEDED
Status: COMPLETED | OUTPATIENT
Start: 2024-07-01 | End: 2024-07-01

## 2024-07-01 RX ORDER — ACETAMINOPHEN 500 MG
1000 TABLET ORAL EVERY 6 HOURS PRN
Qty: 30 TABLET | Refills: 2 | Status: SHIPPED | OUTPATIENT
Start: 2024-07-01 | End: 2024-07-12

## 2024-07-01 RX ORDER — OXYCODONE HYDROCHLORIDE 5 MG/1
5 TABLET ORAL EVERY 4 HOURS PRN
Status: DISCONTINUED | OUTPATIENT
Start: 2024-07-01 | End: 2024-07-01 | Stop reason: HOSPADM

## 2024-07-01 RX ORDER — SODIUM CHLORIDE, SODIUM LACTATE, POTASSIUM CHLORIDE, CALCIUM CHLORIDE 600; 310; 30; 20 MG/100ML; MG/100ML; MG/100ML; MG/100ML
INJECTION, SOLUTION INTRAVENOUS CONTINUOUS PRN
Status: DISCONTINUED | OUTPATIENT
Start: 2024-07-01 | End: 2024-07-01

## 2024-07-01 RX ORDER — ROCURONIUM BROMIDE 10 MG/ML
INJECTION, SOLUTION INTRAVENOUS AS NEEDED
Status: DISCONTINUED | OUTPATIENT
Start: 2024-07-01 | End: 2024-07-01

## 2024-07-01 RX ORDER — FENTANYL CITRATE 50 UG/ML
INJECTION, SOLUTION INTRAMUSCULAR; INTRAVENOUS AS NEEDED
Status: DISCONTINUED | OUTPATIENT
Start: 2024-07-01 | End: 2024-07-01

## 2024-07-01 RX ORDER — LIDOCAINE HYDROCHLORIDE 20 MG/ML
INJECTION, SOLUTION INFILTRATION; PERINEURAL AS NEEDED
Status: DISCONTINUED | OUTPATIENT
Start: 2024-07-01 | End: 2024-07-01

## 2024-07-01 RX ORDER — ALBUMIN HUMAN 50 G/1000ML
SOLUTION INTRAVENOUS AS NEEDED
Status: DISCONTINUED | OUTPATIENT
Start: 2024-07-01 | End: 2024-07-01

## 2024-07-01 RX ORDER — ACETAMINOPHEN 325 MG/1
975 TABLET ORAL ONCE
Status: COMPLETED | OUTPATIENT
Start: 2024-07-01 | End: 2024-07-01

## 2024-07-01 RX ORDER — ONDANSETRON 4 MG/1
4 TABLET, FILM COATED ORAL EVERY 8 HOURS PRN
Qty: 30 TABLET | Refills: 2 | Status: SHIPPED | OUTPATIENT
Start: 2024-07-01 | End: 2024-07-31

## 2024-07-01 RX ORDER — VANCOMYCIN HYDROCHLORIDE 1 G/20ML
INJECTION, POWDER, LYOPHILIZED, FOR SOLUTION INTRAVENOUS AS NEEDED
Status: DISCONTINUED | OUTPATIENT
Start: 2024-07-01 | End: 2024-07-01 | Stop reason: HOSPADM

## 2024-07-01 RX ORDER — BUPIVACAINE HCL/EPINEPHRINE 0.5-1:200K
VIAL (ML) INJECTION AS NEEDED
Status: DISCONTINUED | OUTPATIENT
Start: 2024-07-01 | End: 2024-07-01

## 2024-07-01 RX ORDER — MIDAZOLAM HYDROCHLORIDE 1 MG/ML
INJECTION, SOLUTION INTRAMUSCULAR; INTRAVENOUS AS NEEDED
Status: DISCONTINUED | OUTPATIENT
Start: 2024-07-01 | End: 2024-07-01

## 2024-07-01 RX ORDER — PROPOFOL 10 MG/ML
INJECTION, EMULSION INTRAVENOUS AS NEEDED
Status: DISCONTINUED | OUTPATIENT
Start: 2024-07-01 | End: 2024-07-01

## 2024-07-01 RX ORDER — DIPHENHYDRAMINE HYDROCHLORIDE 50 MG/ML
12.5 INJECTION INTRAMUSCULAR; INTRAVENOUS ONCE AS NEEDED
Status: DISCONTINUED | OUTPATIENT
Start: 2024-07-01 | End: 2024-07-01 | Stop reason: HOSPADM

## 2024-07-01 SDOH — HEALTH STABILITY: MENTAL HEALTH: CURRENT SMOKER: 0

## 2024-07-01 ASSESSMENT — PAIN - FUNCTIONAL ASSESSMENT
PAIN_FUNCTIONAL_ASSESSMENT: 0-10

## 2024-07-01 ASSESSMENT — PAIN SCALES - GENERAL
PAINLEVEL_OUTOF10: 2
PAINLEVEL_OUTOF10: 0 - NO PAIN
PAINLEVEL_OUTOF10: 2
PAINLEVEL_OUTOF10: 7

## 2024-07-01 ASSESSMENT — COLUMBIA-SUICIDE SEVERITY RATING SCALE - C-SSRS
6. HAVE YOU EVER DONE ANYTHING, STARTED TO DO ANYTHING, OR PREPARED TO DO ANYTHING TO END YOUR LIFE?: NO
2. HAVE YOU ACTUALLY HAD ANY THOUGHTS OF KILLING YOURSELF?: NO
1. IN THE PAST MONTH, HAVE YOU WISHED YOU WERE DEAD OR WISHED YOU COULD GO TO SLEEP AND NOT WAKE UP?: NO

## 2024-07-01 ASSESSMENT — ACTIVITIES OF DAILY LIVING (ADL): HOME_MANAGEMENT_TIME_ENTRY: 14

## 2024-07-01 NOTE — OP NOTE
Left Shoulder Total Reverse Arthroplasty (L) Operative Note     Date: 2024  OR Location: [facility] A OR    Name: Allen Vela : 1965, Age: 58 y.o., MRN: [id number], Sex: male    Diagnosis  Pre-op Diagnosis     * Left rotator cuff tear arthropathy [M75.102, M12.812] Post-op Diagnosis     * Left rotator cuff tear arthropathy [M75.102, M12.812]     * Tendinopathy of left biceps tendon [M67.922]     Procedures  Left Shoulder Total Reverse Arthroplasty  65030 - DC ARTHROPLASTY GLENOHUMERAL JOINT TOTAL SHOULDER    DC TENODESIS LONG TENDON BICEPS [68813]  Surgeons      * Tanner Padilla - Primary    Resident/Fellow/Other Assistant:  Surgeons and Role:     * Wilber Barnes DO - Resident - Assisting    Procedure Summary  Anesthesia: Regional, General  ASA: III  Anesthesia Staff: Anesthesiologist: Rufus Odom MD  CRNA: KERVIN Rowe-CRNA  C-AA: BLAYNE Calero  Estimated Blood Loss: 100 mL  Intra-op Medications:   Administrations occurring from 0930 to 1200 on 24:   Medication Name Total Dose   vancomycin (Vancocin) vial for injection 1 g              Anesthesia Record               Intraprocedure I/O Totals          Intake    Tranexamic Acid 0.00 mL    The total shown is the total volume documented since Anesthesia Start was filed.    Total Intake 0 mL          Specimen: No specimens collected     Staff:   Circulator: Yaneth Cheney Person: Jer Cheney Person: Brandie  Circulator: Filipe         Drains and/or Catheters: * None in log *    Tourniquet Times:         Implants:  Implants       Type Name Action Serial No.      Screw GLENOID, HEAD W/RETAINING SCREW, RSP, 36MM NEUTRAL - SNA - [id number] Implanted NA     Joint BASEPLATE, GLENOID, RSP, P2-COATED - SNA - [id number] Implanted NA     Screw SCREW, LOCKING BONE, RSP, 5 X30 - SNA - [id number] Implanted NA     Screw SCREW, LOCKING BONE, RSP, 5 X 26MM - SNA - [id number] Implanted NA     Joint STEM, HUMERAL, SMALL SHELL,  6 X 108MM - SNA -  [id number] Implanted NA     Joint Shoulder SMALL SOCKET INSERT 36MM NEUTRAL Implanted NA               IMPLANTS: DJO implants were used. DJO baseplate, 36N glenosphere, 26, 30 mm screws, 6 standard length humeral stem, neutral liner for 36 glenosphere    INDICATIONS FOR PROCEDURE: The patient was diagnosed with cuff tear arthropathy of the left shoulder. Patient was having severe limitations in daily function as well as consistent pain after attempting nonoperative options. Treatment options were discussed. The patient was proposed to have a reverse shoulder arthroplasty and other procedures as indicated. Risks and benefits of surgery and alternatives of treatment were discussed.  The patient understood all the risks and benefits and wanted to proceed with surgical option.    DESCRIPTION OF PROCEDURE: Patient was met in the preoperative holding area. Consent for surgery was reviewed and the correct operative extremity was verified and marked. The patient then underwent a preoperative nerve block, please see anesthesia records regarding this. The patient was then brought to the operating room and was placed in a supine position on the operating table.  SCDs were placed for DVT prophylaxis. General anesthesia was induced. The patient was placed in a beach chair position to around 30 degrees of inclination, and all the bony prominences were well padded. The operative upper extremity was prepped and draped in usual sterile fashion.  A time out was held confirming the correct patient, operative side, operative procedure, preoperative antibiotics, preoperative TXA, implants being present and that it was safe to proceed--all were in agreement it was safe to proceed.    Attention was directed to the operative shoulder.  A deltopectoral incision was made from just above the coracoid to the deltoid insertion.  Skin was incised with a knife and the deep dermal layer was incised using electrocautery.  Dissection was carried out  through the subcutaneous tissue to the level of the deltoid fascia.  The skin flaps medially and laterally were then made and the cephalic vein was identified.  The cephalic vein was carefully mobilized laterally and any crossing vessels were cauterized.  The interval between the deltoid and pectoralis major was developed in the pectoralis major insertion was palpated.  About a centimeter of the upper portion of the pectoralis major was released.  The subdeltoid space and subacromial space was then carefully freed of adhesions using blunt dissection.  Clavipectoral fascia was then incised and the conjoined tendon was mobilized medially.  The axillary nerve was palpated underneath the conjoined tendon.  The anterior humeral circumflex vessels were identified and cauterized electrocautery.  Biceps tendon was then identified and noted to be partially torn and inflamed and tenodesed to the upper portion of the pectoralis major.  The biceps tunnel was then unroofed to the base of the coracoid and the biceps tendon was excised leaving a small stump still attached to the superior labrum.   The lower border of the subscapularis was then defined and marked with electrocautery.  The subscapularis was peeled off of the lesser tuberosity.  The subscapularis peel was tagged with a suture to help with mobilization.  Using electrocautery and sequential external rotation of the shoulder, the capsule was removed superiorly to inferiorly and then inferiorly around the humeral neck.  Care was taken to protect the axillary nerve throughout the capsular release.  Osteophyte was visualized.  Capsule was released past the 6 o'clock position in the posterior inferior glenohumeral ligament was also released.  The shoulder was then able to be dislocated at this time. At this point, the severity of disease was clear. There was cartilage loss. The rotator cuff was torn. Osteophytes were removed using a rongeur to define the native humeral  neck. The humeral head was cut in native version and at a 135 degree neck shaft angle using a saw. A humeral head protector was placed.    Attention was then directed to the glenoid. A fukuda retractor was placed posteriorly behind the glenoid.  A blunt Hohmann was placed to protect the axillary nerve inferiorly.  The interval between subscapularis and capsule was dissected and the capsule was cut from inferior to superior using dissecting scissors.  The capsular flap was then excised, removing the majority of the inferior and anterior diseased capsule.  Retractors were then placed anteriorly and superiorly along the glenoid.  The remaining biceps stump and remaining anterior, inferior and posterior labrum were then removed using electrocautery.  Any remaining cartilage on the glenoid surface was removed using a Mcghee.  The arthritic bony anatomy of the glenoid was then well visualized.  A guidepin was then placed into the glenoid inferior to the midpoint of the glenoid and in slight inferior tilt.  A tap was then placed in the trajectory of the guidepin.  Reamers were then used to ream the glenoid to achieve 90% backside support for the baseplate.  A DJO monoblock baseplate was then placed.  Screws were then placed into the baseplate using the locking drill guide.  The glenosphere was then placed onto the baseplate and securely fixed.  After confirming that the glenosphere was fully seated into the baseplate, a locking screw was placed into the glenosphere.     Attention was then redirected to the humerus.  A central hemispherical reamer was used to prepare the proximal humerus for an inlay humeral stem.  The canal was sequentially broached by hand until adequate resistance was felt. This determined the size of the humeral stem. The final humeral stem was placed in a press fit fashion with bone graft from the cut humeral head applied proximally. A neutral liner was then placed and the shoulder was reduced.   Stability of the construct was then tested in forward flexion, external rotation, lateral shuck.  Impingement of bone was also noted.  Different sized liners were then trialed until adequate stability was achieved.  Any impinging bone was removed using a rongeur. Final range of motion exam of the final implants showed good stability and good range of motion.      The subscapularis was then repaired.  Prior to the final humeral stem insertion, 3 drill holes were placed superior to inferior in the bicipital groove and 3 FiberWire sutures were passed through these drill holes.  The sutures were then used to repair the subscapularis using a modified Trevon-Tacos type stitch.    Copious irrigation was performed. Dilute betadine was used to wash the wound as well. 1 gram of vancomycin powder was placed in the wound. #1 Vicryl was used to close the deep tissue layer superficial to the deltopectoral interval, 2-0 Vicryl was used to for subcutaneous closure, and 3-0 monocryl was used to close the skin. Dermabond was applied and a sterile, water proof dressing was placed.  The patient was placed in a sling with an abduction pillow.  The patient was brought out from general anesthesia without any complications and was transported to postanesthesia care unit in good condition.    Dragon software was used to dictate this note, please be aware that minor errors in transcription may be present.    ATTESTATION: Dr. Padilla was present for the entirety of the procedure and personally performed all aspects of this procedure.    22 modifier: The patient had a BMI over 40 which presented significant challenges more than normal with exposure of the shoulder.  This required further releases, extra use of instrumentation and retractors in order to achieve appropriate exposure and stable positioning of implants.  Due to this, 22 modifier will be added.      Tanner Padilla MD    Shoulder/Elbow Surgery  [facility]      Complications:  None; patient tolerated the procedure well.    Disposition: PACU - hemodynamically stable.  Condition: stable     Attending Attestation: I was present and scrubbed for the entire procedure.    Tanner Padilla  Phone Number: [phone]

## 2024-07-01 NOTE — ANESTHESIA PROCEDURE NOTES
Airway  Date/Time: 7/1/2024 10:32 AM  Urgency: elective    Airway not difficult    Staffing  Performed: BLAYNE   Authorized by: Rufus Odom MD    Performed by: BLAYNE Calero  Patient location during procedure: OR    Indications and Patient Condition  Indications for airway management: anesthesia  Spontaneous Ventilation: absent  Sedation level: deep  Preoxygenated: yes  Patient position: sniffing  Mask difficulty assessment: 1 - vent by mask  Planned trial extubation    Final Airway Details  Final airway type: endotracheal airway      Successful airway: ETT     Successful intubation technique: video laryngoscopy  Endotracheal tube insertion site: oral  Blade: Jj  Blade size: #4  ETT size (mm): 7.5  Cormack-Lehane Classification: grade I - full view of glottis  Placement verified by: chest auscultation and capnometry   Measured from: lips  Number of attempts at approach: 1

## 2024-07-01 NOTE — ANESTHESIA PROCEDURE NOTES
Peripheral Block    Patient location during procedure: pre-op  Start time: 7/1/2024 9:50 AM  End time: 7/1/2024 10:01 AM  Reason for block: procedure for pain, at surgeon's request and post-op pain management  Staffing  Performed: attending   Authorized by: Rufus Odom MD    Performed by: Rufus Odom MD  Preanesthetic Checklist  Completed: patient identified, IV checked, site marked, risks and benefits discussed, surgical consent, monitors and equipment checked, pre-op evaluation and timeout performed   Timeout performed at: 7/1/2024 9:50 AM  Peripheral Block  Patient position: sitting  Prep: ChloraPrep  Patient monitoring: continuous pulse ox and heart rate  Block type: interscalene  Laterality: left  Injection technique: single-shot  Local infiltration: lidocaine  Infiltration strength: 1 %  Dose: 3 mL  Needle  Needle type: pencil-tip   Needle gauge: 22 G  Needle length: 5 cm  Needle localization: nerve stimulator and ultrasound guidance  Assessment  Injection assessment: negative aspiration for heme, no paresthesia on injection, incremental injection and local visualized surrounding nerve on ultrasound  Heart rate change: no  Slow fractionated injection: yes  Additional Notes  Informed consent given and questions answered. Decadron 4 mg added to 0.5% 30cc bupivicaine. Nerves identified with ultrasound. Procedure well-tolerated.

## 2024-07-01 NOTE — ANESTHESIA POSTPROCEDURE EVALUATION
Patient: Allen Vela    Procedure Summary       Date: 07/01/24 Room / Location: [facility] OR 18 / Virtual [facility] OR    Anesthesia Start: 1024 Anesthesia Stop: 1321    Procedure: Left Shoulder Total Reverse Arthroplasty (Left: Shoulder) Diagnosis:       Left rotator cuff tear arthropathy      Tendinopathy of left biceps tendon      (Left rotator cuff tear arthropathy [M75.102, M12.812])    Surgeons: Tanner Padilla MD Responsible Provider: Rufus Odom MD    Anesthesia Type: general, regional ASA Status: 3            Anesthesia Type: general, regional    Vitals Value Taken Time   /78 07/01/24 1347   Temp 36.2 °C (97.2 °F) 07/01/24 1316   Pulse 75 07/01/24 1354   Resp 16 07/01/24 1354   SpO2 97 % 07/01/24 1354   Vitals shown include unfiled device data.    Anesthesia Post Evaluation    Patient location during evaluation: PACU  Patient participation: complete - patient participated  Level of consciousness: awake  Pain management: adequate  Multimodal analgesia pain management approach  Airway patency: patent  Cardiovascular status: acceptable  Respiratory status: acceptable  Hydration status: acceptable  Postoperative Nausea and Vomiting: none  Comments: Will continue to assess PONV status.        There were no known notable events for this encounter.

## 2024-07-01 NOTE — ANESTHESIA PREPROCEDURE EVALUATION
Patient: Allen Vela    Procedure Information       Date/Time: 07/01/24 0930    Procedure: Left Shoulder Total Reverse Arthroplasty (Left: Shoulder)    Location: [facility] 18 / Virtual [facility] OR    Surgeons: Tanner Padilla MD            Relevant Problems   Anesthesia (within normal limits)      Cardiac   (+) Essential hypertension   (+) Other hyperlipidemia      Pulmonary   (+) COPD (chronic obstructive pulmonary disease) (Multi)   (+) Obstructive sleep apnea (adult) (pediatric) (No CPAP)      Neuro   (+) Depression (On ketamine)      GI   (+) PUD (peptic ulcer disease)      /Renal   (+) Chronic renal insufficiency      Musculoskeletal   (+) Cervical spondylosis without myelopathy   (+) Lumbar stenosis with neurogenic claudication   (+) Lumbosacral spondylosis without myelopathy       Clinical information reviewed:   Tobacco  Allergies  Meds   Med Hx  Surg Hx   Fam Hx  Soc Hx        NPO Detail:  NPO/Void Status  Date of Last Liquid: 07/01/24  Time of Last Liquid: 0630  Date of Last Solid: 06/30/24  Time of Last Solid: 2345         Physical Exam    Airway  Mallampati: III  TM distance: >3 FB  Neck ROM: full     Cardiovascular    Dental    Pulmonary    Abdominal            Anesthesia Plan    History of general anesthesia?: yes  History of complications of general anesthesia?: no    ASA 3     general and regional     The patient is not a current smoker.    intravenous induction   Anesthetic plan and risks discussed with patient.    Plan discussed with CAA.

## 2024-07-01 NOTE — PROGRESS NOTES
Occupational Therapy    Evaluation    Patient Name: Allen Vela  MRN: [id number]  Today's Date: 7/1/2024  Time Calculation  Start Time: 1506  Stop Time: 1549  Time Calculation (min): 43 min          General  Reason for Referral: Pt is a 57 y/o male POD#0 L reverse TSR by Dr. Padilla. See paper chart in PACU for OT evaluation.

## 2024-07-01 NOTE — DISCHARGE INSTRUCTIONS
Shoulder and Elbow Service  Tanner Padilla MD    Discharge Instructions after Reverse Ball and Socket Arthroplasty     Surgical Dressing: You have a bandage over your shoulder. DO NOT REMOVE THIS BANDAGE. It is waterproof. OK to take showers, but do not submerge in a bath. Do not scrub the dressing, allow water to run over, and pat it dry.     Activity: Remain in your sling at all times. This includes sleeping in your sling. You should come out of the sling a 4-5x times a day to work on elbow range of motion. OK to use wrist and fingers for light activities. Do not actively move your shoulder during this time. Active reaching and lifting away from the body are not permitted. You may use the operative arm for activities of daily living that do not require the operative arm to leave the side of the body. Such as: eating, drinking and bathing. Remain non-weight bearing with the operative arm until you are seen post operatively.     Pain: Pain medication has been prescribed for you. You will likely need the strong, narcotic pain medicine (usually oxycodone) for the first 72 hours. If pain is severe in the first few days, it is OK to take the oxycodone as 2 tablets every 4 hours as needed. Otherwise, take 1 tablet every 4-6 hours. You should take the extra-strength tylenol with the oxycodone, and after a few days you should be only taking the tylenol. Use cryotherapy machine or ice on the shoulder for the first 2 weeks following surgery.    Other medications: OK to resume all other home medications the day after surgery, unless you were told otherwise. Avoid taking anti-inflammatory pain medications (Advil, Motrin, Ibuprofen, Aleve, Naproxen or Naprosyn) for 2 weeks after surgery. You will also be given anti-nausea medicine (Zofran) and anti-constipation medicine (docusate) and can use these as needed.     Blood clot prevention: Aspirin has been prescribed to prevent blood clots. Take one aspirin (81 mg) tablet once  per day for 2 weeks after surgery, unless you have an aspirin sensitivity or allergy, asthma or are on blood thinners. If Coumadin, Plavix, Xarelto or other blood thinning medication is prescribed for blood clot prevention, take this medication as directed by your medical clearance physician.     Sleeping: After shoulder surgery, it is often uncomfortable to sleep on your back or side. Recommended sleeping positions are in a recliner or in bed with a ramp pillow or multiple pillows under your back.     Swelling: Swelling around the shoulder that travels to the elbow/hand/fingers is normal after shoulder surgery. Doing your elbow/wrist/finger exercises will help reduce this.     Please call the office at [phone] for any problems. Including the following:  - Excessive redness of the incision  - ANY drainage at or around incision  - Fever of more than 101.5 F    A postoperative follow up appointment will be made for you. Please call [phone] with questions. You should see Dr Padilla 10-14 days after your surgical procedure.

## 2024-07-12 ENCOUNTER — APPOINTMENT (OUTPATIENT)
Dept: NEPHROLOGY | Facility: CLINIC | Age: 59
End: 2024-07-12
Payer: COMMERCIAL

## 2024-07-12 VITALS
WEIGHT: 273 LBS | TEMPERATURE: 98.1 F | HEART RATE: 68 BPM | BODY MASS INDEX: 42.85 KG/M2 | OXYGEN SATURATION: 95 % | DIASTOLIC BLOOD PRESSURE: 63 MMHG | HEIGHT: 67 IN | SYSTOLIC BLOOD PRESSURE: 103 MMHG

## 2024-07-12 DIAGNOSIS — M32.10 SYSTEMIC LUPUS ERYTHEMATOSUS, ORGAN OR SYSTEM INVOLVEMENT UNSPECIFIED (MULTI): ICD-10-CM

## 2024-07-12 DIAGNOSIS — N18.32 CHRONIC RENAL IMPAIRMENT, STAGE 3B (MULTI): ICD-10-CM

## 2024-07-12 DIAGNOSIS — I10 ESSENTIAL HYPERTENSION: Primary | ICD-10-CM

## 2024-07-12 DIAGNOSIS — Z79.1 NSAID LONG-TERM USE: ICD-10-CM

## 2024-07-12 PROCEDURE — 99212 OFFICE O/P EST SF 10 MIN: CPT | Performed by: NURSE PRACTITIONER

## 2024-07-12 PROCEDURE — 3008F BODY MASS INDEX DOCD: CPT | Performed by: NURSE PRACTITIONER

## 2024-07-12 PROCEDURE — 3078F DIAST BP <80 MM HG: CPT | Performed by: NURSE PRACTITIONER

## 2024-07-12 PROCEDURE — 3074F SYST BP LT 130 MM HG: CPT | Performed by: NURSE PRACTITIONER

## 2024-07-17 ENCOUNTER — HOSPITAL ENCOUNTER (OUTPATIENT)
Dept: RADIOLOGY | Facility: CLINIC | Age: 59
Discharge: HOME | End: 2024-07-17
Payer: COMMERCIAL

## 2024-07-17 ENCOUNTER — OFFICE VISIT (OUTPATIENT)
Dept: ORTHOPEDIC SURGERY | Facility: CLINIC | Age: 59
End: 2024-07-17
Payer: COMMERCIAL

## 2024-07-17 DIAGNOSIS — M25.512 LEFT SHOULDER PAIN, UNSPECIFIED CHRONICITY: ICD-10-CM

## 2024-07-17 DIAGNOSIS — Z96.612 AFTERCARE FOLLOWING LEFT SHOULDER JOINT REPLACEMENT SURGERY: Primary | ICD-10-CM

## 2024-07-17 DIAGNOSIS — Z47.1 AFTERCARE FOLLOWING LEFT SHOULDER JOINT REPLACEMENT SURGERY: Primary | ICD-10-CM

## 2024-07-17 PROCEDURE — 99211 OFF/OP EST MAY X REQ PHY/QHP: CPT | Performed by: STUDENT IN AN ORGANIZED HEALTH CARE EDUCATION/TRAINING PROGRAM

## 2024-07-17 PROCEDURE — 73030 X-RAY EXAM OF SHOULDER: CPT | Mod: LT

## 2024-07-17 PROCEDURE — 73030 X-RAY EXAM OF SHOULDER: CPT | Mod: LEFT SIDE | Performed by: RADIOLOGY

## 2024-07-17 NOTE — PROGRESS NOTES
History: Patient returns to the office status post L RSA on 2 weeks ago. Patient has been immobilized in a sling and pain is well controlled. Denies numbness/tingling.     Past medical history, past surgical history, medications, allergies, family history, social history, and review of systems were reviewed today and have been documented separately in this encounter.   A 12 point review of systems was negative other than as stated in the HPI.    Physical Examination:    On exam of the left upper extremity, incisions are well healed, without significant erythema or drainage, dressing removed today. Demonstrates full ROM of the elbow/wrist/hand. Neurovascularly intact throughout.    Imaging: Xrs of the operative shoulder were performed today. Implants in stable alignment. No acute complications noted.     Assessment: 2 weeks s/p L RSA     Plan:   At this point we will start PT and progress ROM as tolerated. Strengthening after ROM is full. We will see them back in 3-4 weeks. All questions answered. Please get new shoulder Xrs at the next visit.     Dragon software was used to dictate this note, please be aware that minor errors in transcription may be present.    Tanner Padilla MD    Shoulder/Elbow Surgery  [facility] [place]

## 2024-07-31 ASSESSMENT — ENCOUNTER SYMPTOMS
ENDOCRINE NEGATIVE: 1
CONSTITUTIONAL NEGATIVE: 1
EYES NEGATIVE: 1
GASTROINTESTINAL NEGATIVE: 1
RESPIRATORY NEGATIVE: 1
ARTHRALGIAS: 1
NEUROLOGICAL NEGATIVE: 1
PSYCHIATRIC NEGATIVE: 1
CARDIOVASCULAR NEGATIVE: 1
HEMATOLOGIC/LYMPHATIC NEGATIVE: 1

## 2024-07-31 NOTE — PROGRESS NOTES
History Of Present Illness  Allen Vela is a 59 y.o. 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     Past Medical History  As above.    Surgical History  He has a past surgical history that includes [place] tooth extraction; Colonoscopy; and Lumbar epidural injection (05/14/2024).     Social History  He reports that he quit smoking about 14 years ago. His smoking use included cigarettes. He started smoking about 34 years ago. He has a 10 pack-year smoking history. He has never used smokeless tobacco. He reports current alcohol use. He reports that he does not use drugs.    Family History  Family History   Problem Relation Name Age of Onset    COPD Mother      Heart failure Mother      Heart failure Father      No Known Problems Sister      Lupus Sister          skin    No Known Problems Sister      No Known Problems Sister      No Known Problems Brother      Diabetes Maternal Grandmother          Allergies  Montelukast and Ace inhibitors    Review of Systems   Constitutional: Negative.    HENT: Negative.     Eyes: Negative.    Respiratory: Negative.     Cardiovascular: Negative.    Gastrointestinal: Negative.    Endocrine: Negative.    Genitourinary: Negative.    Musculoskeletal:  Positive for arthralgias.   Skin: Negative.    Neurological: Negative.    Hematological: Negative.    Psychiatric/Behavioral: Negative.          Physical Exam  Constitutional:       Appearance: Normal appearance.   HENT:      Head: Normocephalic and atraumatic.      Mouth/Throat:      Mouth: Mucous membranes are moist.   Cardiovascular:      Rate and Rhythm: Normal rate and regular rhythm.      Pulses: Normal pulses.      Heart sounds: Normal heart sounds.   Pulmonary:      Effort: Pulmonary effort is normal.      Breath sounds: Normal breath sounds.   Musculoskeletal:       "Comments: Chronic joint pain   Skin:     General: Skin is warm and dry.   Neurological:      General: No focal deficit present.      Mental Status: He is alert and oriented to person, place, and time.   Psychiatric:         Mood and Affect: Mood normal.         Behavior: Behavior normal.         Thought Content: Thought content normal.         Judgment: Judgment normal.          Last Recorded Vitals  Blood pressure 103/63, pulse 68, temperature 36.7 °C (98.1 °F), temperature source Temporal, height 1.702 m (5' 7\"), weight 124 kg (273 lb), SpO2 95%.    Relevant Results       Recent Results (from the past 1344 hour(s))   ECG 12 lead (Clinic Performed)    Collection Time: 06/19/24  2:30 PM   Result Value Ref Range    Ventricular Rate 77 BPM    Atrial Rate 77 BPM    HI Interval 180 ms    QRS Duration 86 ms    QT Interval 374 ms    QTC Calculation(Bazett) 423 ms    P Axis 87 degrees    R Axis 7 degrees    T Axis 24 degrees    QRS Count 13 beats    Q Onset 219 ms    P Onset 129 ms    P Offset 173 ms    T Offset 406 ms    QTC Fredericia 406 ms   Staphylococcus aureus/MRSA colonization, Culture    Collection Time: 06/19/24  3:05 PM    Specimen: Nares/Axilla/Groin; Swab   Result Value Ref Range    Staph/MRSA Screen Culture (A)      Isolated: Methicillin Susceptible Staphylococcus aureus (MSSA)   CBC and Auto Differential    Collection Time: 06/19/24  3:38 PM   Result Value Ref Range    WBC 3.6 (L) 4.4 - 11.3 x10*3/uL    nRBC 0.0 0.0 - 0.0 /100 WBCs    RBC 3.73 (L) 4.50 - 5.90 x10*6/uL    Hemoglobin 12.5 (L) 13.5 - 17.5 g/dL    Hematocrit 37.9 (L) 41.0 - 52.0 %     (H) 80 - 100 fL    MCH 33.5 26.0 - 34.0 pg    MCHC 33.0 32.0 - 36.0 g/dL    RDW 14.5 11.5 - 14.5 %    Platelets 233 150 - 450 x10*3/uL    Neutrophils % 41.3 40.0 - 80.0 %    Immature Granulocytes %, Automated 0.3 0.0 - 0.9 %    Lymphocytes % 28.7 13.0 - 44.0 %    Monocytes % 20.9 2.0 - 10.0 %    Eosinophils % 7.7 0.0 - 6.0 %    Basophils % 1.1 0.0 - 2.0 %    " Neutrophils Absolute 1.50 1.20 - 7.70 x10*3/uL    Immature Granulocytes Absolute, Automated 0.01 0.00 - 0.70 x10*3/uL    Lymphocytes Absolute 1.04 (L) 1.20 - 4.80 x10*3/uL    Monocytes Absolute 0.76 0.10 - 1.00 x10*3/uL    Eosinophils Absolute 0.28 0.00 - 0.70 x10*3/uL    Basophils Absolute 0.04 0.00 - 0.10 x10*3/uL   Basic Metabolic Panel    Collection Time: 06/19/24  3:38 PM   Result Value Ref Range    Glucose 85 74 - 99 mg/dL    Sodium 141 136 - 145 mmol/L    Potassium 4.3 3.5 - 5.3 mmol/L    Chloride 106 98 - 107 mmol/L    Bicarbonate 24 21 - 32 mmol/L    Anion Gap 15 10 - 20 mmol/L    Urea Nitrogen 24 (H) 6 - 23 mg/dL    Creatinine 2.18 (H) 0.50 - 1.30 mg/dL    eGFR 34 (L) >60 mL/min/1.73m*2    Calcium 9.4 8.6 - 10.3 mg/dL   Hemoglobin A1C    Collection Time: 06/19/24  3:38 PM   Result Value Ref Range    Hemoglobin A1C 4.5 see below %    Estimated Average Glucose 82 Not Established mg/dL         Assessment/Plan     59 y.o. 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 Most recent sCr 2.18 mg/dL with eGFR 34 ml/min/1.73m2 (6/19/24) - at baseline. Currently stable.    # HTN: BP well controlled with current regimen.    # Lupus: dx in 2009. f/u with rheumatology.    # Hx of NSAIDs use: long-term use for chronic pain. Has significantly cut down since last visit. Patient is actively seeking alternative treatment regimen for chronic pain.    Plan:  - Continue to follow up with pain medicine for chronic pain management so that no continuous NSAIDs use.   - Continue to follow up with rheumatology for optimal lupus management.  - No  changes with current medications.  - Reviewed strategies for preserving remaining kidney function includin) Avoidance of NSAIDs including Aleve (Naprosyn), Motrin (Ibuprofen), Mobic (Meloxicam), Celebrex (Celecoxib) and aspirin as well as PPI acid blocking medications such as Prilosec (omeprazole), Protonix (pantoprazole), and Nexium (esomeprazole), as well as other nephrotoxic agents.   2) Avoidance of tobacco or alcohol use.   3) Adequate hydration daily.   4) Blood pressure target 130/80 mmHg.   5) Daily dietary sodium intake less than 2 grams per day.    6) Maintain healthy lifestyle. Healthy diet and regular exercises.   7) Maintain ideal weight.  - FUV: in 4 months or sooner if concerns arise.     Patient verbalized understanding of above. He will not hesitate to contact Division of Nephrology at [phone] with concerns/questions.    I spent 15 minutes in the professional and overall care of this patient.      Izabela Lundberg, APRN-CNP

## 2024-08-08 ENCOUNTER — APPOINTMENT (OUTPATIENT)
Dept: PULMONOLOGY | Facility: HOSPITAL | Age: 59
End: 2024-08-08
Payer: COMMERCIAL

## 2024-08-14 ENCOUNTER — LAB (OUTPATIENT)
Dept: LAB | Facility: LAB | Age: 59
End: 2024-08-14
Payer: COMMERCIAL

## 2024-08-14 ENCOUNTER — APPOINTMENT (OUTPATIENT)
Dept: RHEUMATOLOGY | Facility: CLINIC | Age: 59
End: 2024-08-14
Payer: COMMERCIAL

## 2024-08-14 VITALS
SYSTOLIC BLOOD PRESSURE: 125 MMHG | HEIGHT: 68 IN | OXYGEN SATURATION: 97 % | DIASTOLIC BLOOD PRESSURE: 81 MMHG | BODY MASS INDEX: 39.25 KG/M2 | HEART RATE: 78 BPM | TEMPERATURE: 98.1 F | WEIGHT: 259 LBS

## 2024-08-14 DIAGNOSIS — M32.9 SYSTEMIC LUPUS ERYTHEMATOSUS, UNSPECIFIED SLE TYPE, UNSPECIFIED ORGAN INVOLVEMENT STATUS (MULTI): Primary | ICD-10-CM

## 2024-08-14 DIAGNOSIS — M32.9 SYSTEMIC LUPUS ERYTHEMATOSUS, UNSPECIFIED SLE TYPE, UNSPECIFIED ORGAN INVOLVEMENT STATUS (MULTI): ICD-10-CM

## 2024-08-14 DIAGNOSIS — Z79.899 LONG-TERM USE OF PLAQUENIL: ICD-10-CM

## 2024-08-14 LAB
ALBUMIN SERPL BCP-MCNC: 4.1 G/DL (ref 3.4–5)
ALP SERPL-CCNC: 78 U/L (ref 33–136)
ALT SERPL W P-5'-P-CCNC: 13 U/L (ref 10–52)
ANION GAP SERPL CALC-SCNC: 13 MMOL/L (ref 10–20)
APPEARANCE UR: CLEAR
AST SERPL W P-5'-P-CCNC: 15 U/L (ref 9–39)
BASOPHILS # BLD AUTO: 0.03 X10*3/UL (ref 0–0.1)
BASOPHILS NFR BLD AUTO: 0.8 %
BILIRUB SERPL-MCNC: 0.6 MG/DL (ref 0–1.2)
BILIRUB UR STRIP.AUTO-MCNC: NEGATIVE MG/DL
BUN SERPL-MCNC: 16 MG/DL (ref 6–23)
C3 SERPL-MCNC: 137 MG/DL (ref 87–200)
C4 SERPL-MCNC: 33 MG/DL (ref 10–50)
CALCIUM SERPL-MCNC: 9.5 MG/DL (ref 8.6–10.6)
CHLORIDE SERPL-SCNC: 106 MMOL/L (ref 98–107)
CO2 SERPL-SCNC: 24 MMOL/L (ref 21–32)
COLOR UR: NORMAL
CREAT SERPL-MCNC: 1.84 MG/DL (ref 0.5–1.3)
CREAT UR-MCNC: 69.7 MG/DL (ref 20–370)
CRP SERPL-MCNC: 0.42 MG/DL
DSDNA AB SER-ACNC: 3 IU/ML
EGFRCR SERPLBLD CKD-EPI 2021: 41 ML/MIN/1.73M*2
EOSINOPHIL # BLD AUTO: 0.2 X10*3/UL (ref 0–0.7)
EOSINOPHIL NFR BLD AUTO: 5.5 %
ERYTHROCYTE [DISTWIDTH] IN BLOOD BY AUTOMATED COUNT: 15.4 % (ref 11.5–14.5)
ERYTHROCYTE [SEDIMENTATION RATE] IN BLOOD BY WESTERGREN METHOD: 30 MM/H (ref 0–20)
GLUCOSE SERPL-MCNC: 91 MG/DL (ref 74–99)
GLUCOSE UR STRIP.AUTO-MCNC: NORMAL MG/DL
HCT VFR BLD AUTO: 34.4 % (ref 41–52)
HGB BLD-MCNC: 11.3 G/DL (ref 13.5–17.5)
IMM GRANULOCYTES # BLD AUTO: 0.01 X10*3/UL (ref 0–0.7)
IMM GRANULOCYTES NFR BLD AUTO: 0.3 % (ref 0–0.9)
KETONES UR STRIP.AUTO-MCNC: NEGATIVE MG/DL
LEUKOCYTE ESTERASE UR QL STRIP.AUTO: NEGATIVE
LYMPHOCYTES # BLD AUTO: 1.12 X10*3/UL (ref 1.2–4.8)
LYMPHOCYTES NFR BLD AUTO: 31 %
MCH RBC QN AUTO: 34 PG (ref 26–34)
MCHC RBC AUTO-ENTMCNC: 32.8 G/DL (ref 32–36)
MCV RBC AUTO: 104 FL (ref 80–100)
MONOCYTES # BLD AUTO: 0.61 X10*3/UL (ref 0.1–1)
MONOCYTES NFR BLD AUTO: 16.9 %
NEUTROPHILS # BLD AUTO: 1.64 X10*3/UL (ref 1.2–7.7)
NEUTROPHILS NFR BLD AUTO: 45.5 %
NITRITE UR QL STRIP.AUTO: NEGATIVE
NRBC BLD-RTO: 0 /100 WBCS (ref 0–0)
PH UR STRIP.AUTO: 7 [PH]
PLATELET # BLD AUTO: 258 X10*3/UL (ref 150–450)
POTASSIUM SERPL-SCNC: 4.3 MMOL/L (ref 3.5–5.3)
PROT SERPL-MCNC: 6.7 G/DL (ref 6.4–8.2)
PROT UR STRIP.AUTO-MCNC: NEGATIVE MG/DL
PROT UR-ACNC: 5 MG/DL (ref 5–25)
PROT/CREAT UR: 0.07 MG/MG CREAT (ref 0–0.17)
RBC # BLD AUTO: 3.32 X10*6/UL (ref 4.5–5.9)
RBC # UR STRIP.AUTO: NEGATIVE /UL
SODIUM SERPL-SCNC: 139 MMOL/L (ref 136–145)
SP GR UR STRIP.AUTO: 1.01
UROBILINOGEN UR STRIP.AUTO-MCNC: NORMAL MG/DL
WBC # BLD AUTO: 3.6 X10*3/UL (ref 4.4–11.3)

## 2024-08-14 PROCEDURE — 86160 COMPLEMENT ANTIGEN: CPT

## 2024-08-14 PROCEDURE — 1036F TOBACCO NON-USER: CPT | Performed by: INTERNAL MEDICINE

## 2024-08-14 PROCEDURE — 86140 C-REACTIVE PROTEIN: CPT

## 2024-08-14 PROCEDURE — 80053 COMPREHEN METABOLIC PANEL: CPT

## 2024-08-14 PROCEDURE — 82746 ASSAY OF FOLIC ACID SERUM: CPT

## 2024-08-14 PROCEDURE — 36415 COLL VENOUS BLD VENIPUNCTURE: CPT

## 2024-08-14 PROCEDURE — 82607 VITAMIN B-12: CPT

## 2024-08-14 PROCEDURE — 85025 COMPLETE CBC W/AUTO DIFF WBC: CPT

## 2024-08-14 PROCEDURE — 81003 URINALYSIS AUTO W/O SCOPE: CPT

## 2024-08-14 PROCEDURE — 3074F SYST BP LT 130 MM HG: CPT | Performed by: INTERNAL MEDICINE

## 2024-08-14 PROCEDURE — 84156 ASSAY OF PROTEIN URINE: CPT

## 2024-08-14 PROCEDURE — 99214 OFFICE O/P EST MOD 30 MIN: CPT | Performed by: INTERNAL MEDICINE

## 2024-08-14 PROCEDURE — 85652 RBC SED RATE AUTOMATED: CPT

## 2024-08-14 PROCEDURE — 3079F DIAST BP 80-89 MM HG: CPT | Performed by: INTERNAL MEDICINE

## 2024-08-14 PROCEDURE — 86225 DNA ANTIBODY NATIVE: CPT

## 2024-08-14 PROCEDURE — 3008F BODY MASS INDEX DOCD: CPT | Performed by: INTERNAL MEDICINE

## 2024-08-14 PROCEDURE — 82570 ASSAY OF URINE CREATININE: CPT

## 2024-08-14 RX ORDER — HYDROXYCHLOROQUINE SULFATE 200 MG/1
200 TABLET, FILM COATED ORAL 2 TIMES DAILY
Qty: 90 TABLET | Refills: 1 | Status: SHIPPED | OUTPATIENT
Start: 2024-08-14 | End: 2024-12-12

## 2024-08-14 RX ORDER — TAMSULOSIN HYDROCHLORIDE 0.4 MG/1
0.4 CAPSULE ORAL
COMMUNITY
Start: 2024-07-19

## 2024-08-14 ASSESSMENT — PAIN SCALES - GENERAL: PAINLEVEL: 6

## 2024-08-14 NOTE — PROGRESS NOTES
Subjective   Patient ID: Allen Vela is a 59 y.o. male who presents for Follow-up (Pain).    HPI  60 yo male with lupus and CKD stage III  In 2009, he was diagnosed with SLE in [facility].  No known h/o nephritis  He used HCQ and PRD prn  He had fatigue, joint pain at that time, he does not remember any skin rashes.  Also, the patient reports left shoulder pain and he was diagnosed with left rotator cuff tendinitis.  He also endorses neck pain.  He is using HCQ twice daily, but his physician tapered down its dose 200 mg daily due to no f/u with Ophthalmologist.  His last eye exam was 5 years ago.     04/24:  Today, he is doing well, no active lupus findings.  He denies any active joint disease or skin rashes.     DOMINIQUE 1/1280 pos  RNP 6.7  Sm/RNP >8  Chromatin 6.3  DNA 6    Interval history:  He had shoulder replacement left site in July and no problem until now  He reports hand joint pain and lichen planus related oral ulcer  He denies any photosensitive rash, malar rash, chest pain    Current med:   mg  ROS  Joint pain in hands: positive  Joint swelling: negative  Morning stiffness and duration: negative    strength: normal  Oral ulcer: positive  Genital ulcer: negative  Raynaud phenomenon: negative  Chest pain/dyspnea: negative  Low back pain: negative  Visual problem: negative  Dry eyes/dry mouth: negative  Skin rash/scaling/psoriasis: negative       Objective     PEXAM  VS reviewed, WNL  General: Alert, no distress   HEENT: Normocephalic/atraumatic, No alopecia. PERRLA. Sclera white, conjunctiva pink, no malar rash. no oral or nasal ulcer. Oral cavity pink and moist, no erythema or exudate, dentition good.   Neck: supple  Respiratory: CTA B, no adventitious breath sounds  Cardiac: RRR, no murmurs, carotid, or bruits  Abdominal: symmetrical, soft, non-tender, non-distended, normoactive BSx4 quadrants, no CVA tenderness or suprapubic tenderness  MSK: Joints of upper and lower extremities were  assessed for synovitis and ROM.    Today she has no evidence of synovitis in the joints of her hands or wrists, tender joint count 0, swollen joint count 0   Extremities: no clubbing, no cyanosis, no edema  Skin: Skin warm and moist.   Neuro: non-focal, Strength 5/5 throughout. Normal gait. No cerebellar pathologic exam     Assessment/Plan   58 yo male with lupus and CKD stage III (not related to his SLE)  SLE dx in 2009 based on joint pain and positive autoantibody tests.  No h/o kidney biopsy or other major organ involvement  His DOMINIQUE, chromatin positive, he has h/o arthritis  Currently, no active lupus findings.  Nephrology is following him and thought that his kidney problem is due to his HTN or lupus or analgesic use  PExam showed did not reveal any active joint disease, it showed tender right 5. MCP, and ankles, +OA findings in his right knee  +Lichen planus lesion in his mouth  I think his lupus clinically is in remission, and no need additional work-up for his kidney issue. I do not think he has active lupus nephritis     -will see lupus activation markers, urine  -will use  mg daily with annual eye exam  -will see Dermatology for his Lichen planus  -will see him in 4 months

## 2024-08-15 DIAGNOSIS — M32.9 SYSTEMIC LUPUS ERYTHEMATOSUS, UNSPECIFIED SLE TYPE, UNSPECIFIED ORGAN INVOLVEMENT STATUS (MULTI): Primary | ICD-10-CM

## 2024-08-15 LAB
FOLATE SERPL-MCNC: 17 NG/ML
VIT B12 SERPL-MCNC: 450 PG/ML (ref 211–911)

## 2024-08-15 NOTE — PROGRESS NOTES
"Reason for Visit:   Patient is participating in a research study as per specific study detail below.   Study visit time point: Screening/baseline.   Study short name: CHIP-LUPUS  Type of Visit:   Outpatient.   -----------------------------------   This note is created in accordance with Standard Operating Procedures for Research Studies at [facility] which can be found on the intranet at: [email or web address]            PROTOCOL TITLE: \"Asymptomatic Coronary Artery Disease and Clonal Hematopoiesis of Indeterminate Potential (CHIP) in Patients with Systemic Lupus Erythematosus\" (CHIP-LUPUS)  :  Name: Adriano Manley MD  The following was discussed with the patient:  Purpose of the study  Patient rights and responsibilities  Study Procedures  Risks, benefits, and alternatives to study participation  Study financials including cost to patient  Confidentiality and privacy  Injury language  All questions were answered by the coordinator and Dr. Adriano Manley. Patient verbalized understanding of all the information given to her. Patient signed the consent, as copy of the document was given to the patient for her records.   Standard of care procedures and assessments were performed by Dr. Adriano Manley. Study related information was documented in source document. Labs for standard of care ordered.     Patient left the clinic without assistance.     SALVADOR LOCKHART RN    "

## 2024-08-16 ENCOUNTER — EVALUATION (OUTPATIENT)
Dept: PHYSICAL THERAPY | Facility: CLINIC | Age: 59
End: 2024-08-16
Payer: COMMERCIAL

## 2024-08-16 DIAGNOSIS — Z47.1 AFTERCARE FOLLOWING LEFT SHOULDER JOINT REPLACEMENT SURGERY: ICD-10-CM

## 2024-08-16 DIAGNOSIS — Z96.612 AFTERCARE FOLLOWING LEFT SHOULDER JOINT REPLACEMENT SURGERY: ICD-10-CM

## 2024-08-16 DIAGNOSIS — M25.512 LEFT SHOULDER PAIN, UNSPECIFIED CHRONICITY: ICD-10-CM

## 2024-08-16 PROCEDURE — 97161 PT EVAL LOW COMPLEX 20 MIN: CPT | Mod: GP

## 2024-08-16 NOTE — PROGRESS NOTES
Physical Therapy    Physical Therapy Evaluation    Patient Name: Allen Vela  MRN: [id number]  Today's Date: 8/16/2024    Time Entry:  Time Calculation  Start Time: 1100  Stop Time: 1130  Time Calculation (min): 30 min  PT Evaluation Time Entry  PT Evaluation (Low) Time Entry: 30                      Assessment  PT Assessment Results: Decreased strength, Decreased endurance, Decreased range of motion, Pain, Orthopedic restrictions  Rehab Prognosis: Tra is a 59 year old M  who participated in a physical therapy evaluation today due to L shoulder total reverse arthroplasty. His  impairments include;  tenderness, strength, pain, swelling, ROM deficits. Due to these impairments, pt has the following functional limitations L UE use, OH elevation, reaching, lifting, performing household/recreational/ occupational activities, and performing ADLs]. Pt will benefit from continued skilled physical therapy to address above impairments and progress toward therapy related goals.       Plan  Treatment/Interventions: Blood flow restriction therapy, Education/ Instruction, Electrical stimulation, Hot pack, Manual therapy, Neuromuscular re-education, Therapeutic activities, Therapeutic exercises  PT Plan: Skilled PT  PT Frequency: 2 times per week  Duration: 4 weeks  Onset Date: 07/01/24  Number of Treatments Authorized: eval only  Rehab Potential: Good  Plan of Care Agreement: Patient    Current Problem  1. Left shoulder pain, unspecified chronicity  Referral to Physical Therapy    Follow Up In Physical Therapy      2. Aftercare following left shoulder joint replacement surgery  Referral to Physical Therapy          Subjective   RFV: L Reverse TSA. Will see Dr. Padilla next week  DOS: 07/01  Pain: 4/10  Precautions:     (0 to 6 weeks)    Patients may shower immediately over clear plastic, waterproof dressing  Sutures are all underneath the skin and will dissolve on their own  Sling should be worn at night and when out of house.   May remove the sling during the day.  Do not lift anything greater than 2 to 3 lbs with the involved hand  Initiate exercise program 3 times per day immediately:    Immediate elbow, forearm and hand AROM  (2 to 6 weeks)  AAROM - Pulleys into scapular plane elevation as tolerated    Supine AAROM into flexion and ER as tolerated    Emphasize home program    No resisted IR    Avoid reaching behind the back      Acute/Chronic: Acute    Functional limitations: L UE use  Medication/injections: pain medication as needed            Objective   Shoulder    Functional Rating Scale     Observation  Shoulder Observation Comment: Incision healing well no signs of infection  Shoulder palpation/Joint Assessment  Shoulder Palpation/Joint Mobility Comment: Tenderness noted along anterior shoulder along incision  Cervical AROM     Shoulder AROM  Shoulder AROM WFL: no  Shoulder PROM  Shoulder PROM WFL: no  L shoulder flexion: (180°): 95  R shoulder abduction: (180°): 80  R shoulder ER: (90°): 35  Cervical Myotomes (MMT     Shoulder Strength  Shoulder Strength WFL: no  L shoulder flexion: (5/5): < 3/5  L shoulder abduction: (5/5): <3/5  L shoulder ER: (5/5): <3/5    OP EDUCATION:  Outpatient Education  Individual(s) Educated: Patient  Risk and Benefits Discussed with Patient/Caregiver/Other: yes  Patient/Caregiver Demonstrated Understanding: yes  Plan of Care Discussed and Agreed Upon: yes    Goals:  Active       PT Problem       Pt will demonstrate 4+/5 B/L UE  strength        Start:  08/16/24    Expected End:  11/14/24            Pt will demonstrate > 140 deg of Flexion AROM to assist with OH elevation activities        Start:  08/16/24    Expected End:  11/14/24            pt will report no greater than 2/10 pain for 3 consecutive days         Start:  08/16/24    Expected End:  11/14/24            Pt will be indep with HEP         Start:  08/16/24    Expected End:  11/14/24

## 2024-08-21 ENCOUNTER — HOSPITAL ENCOUNTER (OUTPATIENT)
Dept: RADIOLOGY | Facility: CLINIC | Age: 59
Discharge: HOME | End: 2024-08-21
Payer: COMMERCIAL

## 2024-08-21 ENCOUNTER — OFFICE VISIT (OUTPATIENT)
Dept: ORTHOPEDIC SURGERY | Facility: CLINIC | Age: 59
End: 2024-08-21
Payer: COMMERCIAL

## 2024-08-21 DIAGNOSIS — Z96.612 AFTERCARE FOLLOWING LEFT SHOULDER JOINT REPLACEMENT SURGERY: ICD-10-CM

## 2024-08-21 DIAGNOSIS — Z47.1 AFTERCARE FOLLOWING LEFT SHOULDER JOINT REPLACEMENT SURGERY: ICD-10-CM

## 2024-08-21 PROCEDURE — 73030 X-RAY EXAM OF SHOULDER: CPT | Mod: LT

## 2024-08-21 PROCEDURE — 99211 OFF/OP EST MAY X REQ PHY/QHP: CPT | Performed by: STUDENT IN AN ORGANIZED HEALTH CARE EDUCATION/TRAINING PROGRAM

## 2024-08-21 NOTE — PROGRESS NOTES
History: Patient returns to the office status post L RSA on 6 weeks ago. Denies numbness/tingling. Doing well, pain controlled. Starting PT.     Past medical history, past surgical history, medications, allergies, family history, social history, and review of systems were reviewed today and have been documented separately in this encounter.   A 12 point review of systems was negative other than as stated in the HPI.    Physical Examination:    On exam of the left upper extremity, incisions are well healed, without significant erythema or drainage. Demonstrates full ROM of the elbow/wrist/hand. Neurovascularly intact throughout. Passive ROM of the operative shoulder , ER 20.     Imaging: Xrs of the operative shoulder were performed today. Implants in stable alignment. No acute complications noted.     Assessment: 6 weeks s/p L RSA     Plan:     Patient is doing well, continue PT and ROM and strengthening as tolerated. We will see them back around 3 months postoperatively. All questions answered. Please get new shoulder Xrs at the next visit.       Dragon software was used to dictate this note, please be aware that minor errors in transcription may be present.    Tanner Padilla MD    Shoulder/Elbow Surgery  [facility]

## 2024-08-23 ENCOUNTER — APPOINTMENT (OUTPATIENT)
Dept: PHYSICAL THERAPY | Facility: CLINIC | Age: 59
End: 2024-08-23
Payer: COMMERCIAL

## 2024-08-23 ENCOUNTER — TREATMENT (OUTPATIENT)
Dept: PHYSICAL THERAPY | Facility: CLINIC | Age: 59
End: 2024-08-23
Payer: COMMERCIAL

## 2024-08-23 DIAGNOSIS — M25.512 LEFT SHOULDER PAIN, UNSPECIFIED CHRONICITY: ICD-10-CM

## 2024-08-23 PROCEDURE — 97110 THERAPEUTIC EXERCISES: CPT | Mod: GP,CQ

## 2024-08-23 PROCEDURE — 97140 MANUAL THERAPY 1/> REGIONS: CPT | Mod: GP,CQ

## 2024-08-23 ASSESSMENT — PAIN SCALES - GENERAL: PAINLEVEL_OUTOF10: 3

## 2024-08-23 ASSESSMENT — PAIN DESCRIPTION - DESCRIPTORS: DESCRIPTORS: ACHING;DULL

## 2024-08-23 NOTE — PROGRESS NOTES
"Physical Therapy Treatment    Patient Name: Allen Vela  MRN: [id number]  Today's Date: 10/1/2024  Time Calculation  Start Time: 1100  Stop Time: 1145  Time Calculation (min): 45 min  PT Therapeutic Procedures Time Entry  Manual Therapy Time Entry: 17  Therapeutic Exercise Time Entry: 23    Insurance:  Visit number: 2 of 8  Authorization info:  EVAL ONLY - [employer] / AUTH REQ / 100% COVERAGE / AVAILITY [id number] / ds 8/14/24.   Insurance Type: Payor: [employer] / Plan: [employer] / Product Type: *No Product type* /     Current Problem   1. Left shoulder pain, unspecified chronicity  Follow Up In Physical Therapy          Subjective   General   Reason for Referral: 1. Left shoulder pain, unspecified chronicity  Referral to Physical Therapy    Follow Up In Physical Therapy     2. Aftercare following left shoulder joint replacement surgery  Referral to Physical Therapy  Referred By: Tanner Padilla MD  Precautions:  Precautions  Post-Surgical Precautions: Left shoulder precautions  Precautions Comment: L reverse TSA, S/P 7/01/24  Pain   0-10 (Numeric) Pain Score: 3  Pain Type: Surgical pain  Pain Location: Shoulder  Pain Orientation: Left  Pain Descriptors: Aching, Dull  Post Treatment Pain Level 3/10    Objective   PROM shoulder flexion 109 degrees, ER 50 degrees in supine.    Treatments:  Therapeutic Exercise:  Therapeutic Exercise  Therapeutic Exercise Performed: Yes  Therapeutic Exercise Activity 1: [id number]  Therapeutic Exercise Activity 2: Pulleys flexion5\" hold, 2x10  Therapeutic Exercise Activity 3: Pulleys scaption 5\" hold 2x10  Therapeutic Exercise Activity 4: SB rollout 10\" hold x15  Therapeutic Exercise Activity 5: AAROM shoulder flexion, clasped hands, 5\" hold 2x10  Therapeutic Exercise Activity 6: AAROM ER with wand 5\" hold 2x10  Therapeutic Exercise Activity 7: Scap retractions  Therapeutic Exercise Activity 8: passive bicep stretch 30\"x3    Manual:  Manual Therapy  Manual Therapy Performed: " Yes  Manual Therapy Activity 1: Mild axial distraction with oscillarions  Manual Therapy Activity 2: Gr 2 GH mobes post and inf  Manual Therapy Activity 3: PROM to tolerance flexion, scaption, abduction, ER, with stretches at end range.     Assessment   Assessment:   PT Assessment  PT Assessment Results: Decreased strength, Decreased endurance, Decreased range of motion, Pain, Orthopedic restrictions  Rehab Prognosis: Good  Evaluation/Treatment Tolerance: Patient tolerated treatment well  Assessment Comment: Pt demonstrates improved PROM L shoulder as noted, tolerates progressions with transient increase in L shoulder irritation.    Plan:   OP PT Plan  Treatment/Interventions: Blood flow restriction therapy, Education/ Instruction, Electrical stimulation, Hot pack, Manual therapy, Neuromuscular re-education, Therapeutic activities, Therapeutic exercises  PT Plan: Skilled PT  PT Frequency: 2 times per week  Duration: 4 weeks  Onset Date: 07/01/24  Number of Treatments Authorized: eval only  Rehab Potential: Good  Plan of Care Agreement: Patient    OP EDUCATION:  Outpatient Education  Individual(s) Educated: Patient  Education Provided: Home Exercise Program, Body Mechanics, Anatomy  Patient/Caregiver Demonstrated Understanding: yes  Patient Response to Education: Patient/Caregiver Verbalized Understanding of Information, Patient/Caregiver Performed Return Demonstration of Exercises/Activities, Patient/Caregiver Asked Appropriate Questions    Goals:   Active       PT Problem       Pt will demonstrate 4+/5 B/L UE  strength  (Progressing)       Start:  08/16/24    Expected End:  11/14/24            Pt will demonstrate > 140 deg of Flexion AROM to assist with OH elevation activities  (Progressing)       Start:  08/16/24    Expected End:  11/14/24            pt will report no greater than 2/10 pain for 3 consecutive days   (Progressing)       Start:  08/16/24    Expected End:  11/14/24            Pt will be indep with HEP    (Progressing)       Start:  08/16/24    Expected End:  11/14/24

## 2024-08-27 ENCOUNTER — APPOINTMENT (OUTPATIENT)
Dept: PHYSICAL THERAPY | Facility: CLINIC | Age: 59
End: 2024-08-27
Payer: COMMERCIAL

## 2024-08-30 ENCOUNTER — TREATMENT (OUTPATIENT)
Dept: PHYSICAL THERAPY | Facility: CLINIC | Age: 59
End: 2024-08-30
Payer: COMMERCIAL

## 2024-08-30 DIAGNOSIS — M25.512 LEFT SHOULDER PAIN, UNSPECIFIED CHRONICITY: ICD-10-CM

## 2024-08-30 PROCEDURE — 97110 THERAPEUTIC EXERCISES: CPT | Mod: GP

## 2024-08-30 PROCEDURE — 97140 MANUAL THERAPY 1/> REGIONS: CPT | Mod: GP

## 2024-08-30 NOTE — PROGRESS NOTES
Physical Therapy Treatment    Patient Name: Allen eVla  MRN: [id number]  Today's Date: 8/23/2024  Time Calculation  Start Time: 1015  Stop Time: 1055  Time Calculation (min): 40 min  PT Therapeutic Procedures Time Entry  Manual Therapy Time Entry: 15  Therapeutic Exercise Time Entry: 25    Insurance:  Visit number: 3 of 8  Authorization info:  EVAL ONLY - [employer] / AUTH REQ / 100% COVERAGE / AVAILITY [id number] / ds 8/14/24.   Ins[facility]: [facility] / Plan: [employer] / Product Type: *No Product type* /     Current Problem   1. Left shoulder pain, unspecified chronicity  Follow Up In Physical Therapy          Subjective   General   Pt reports HEP is going well no questions or concerns at this time   Precautions:  Precautions  Post-Surgical Precautions: Left shoulder precautions  Precautions Comment: R reverse TSA, S/P 7/01/24  Pain   0-10 (Numeric) Pain Score: 2  Pain Type: Surgical pain  Pain Location: Shoulder  Pain Orientation: Left  Pain Descriptors: Aching, Dull  Post Treatment Pain Level 2/10    Objective   AAROM shoulder flexion 120 degrees, ER 50 degrees in supine.    Treatments:  Dowel nieves press 3 x 10  Dowel nieves flexion 3 x10  Dowel nieves ER 3 x10  Towel slide slant board multi level x 6 reps ea level  Green tband rows 3 x10  Green tband ER Walkouts 3 x10    Manual:  Manual Therapy  Manual Therapy Performed: Yes  Manual Therapy Activity 1: Mild axial distraction with oscillarions  Manual Therapy Activity 2: Gr 2 GH mobes post and inf  Manual Therapy Activity 3: PROM to tolerance flexion, scaption, abduction, ER, with stretches at end range.     Assessment   Assessment:   Emphasis of todays treatment was to focus on improving mobility PROM, AAROM at this time, and light strengthening. Pt will cont to progress with skilled PT to continue to address above impairments         Plan:   Cont to address strength, mobility deficits   OP EDUCATION:  HEP   Goals:   Active       PT Problem       Pt  will demonstrate 4+/5 B/L UE  strength        Start:  08/16/24    Expected End:  11/14/24            Pt will demonstrate > 140 deg of Flexion AROM to assist with OH elevation activities        Start:  08/16/24    Expected End:  11/14/24            pt will report no greater than 2/10 pain for 3 consecutive days         Start:  08/16/24    Expected End:  11/14/24            Pt will be indep with HEP         Start:  08/16/24    Expected End:  11/14/24

## 2024-09-03 ENCOUNTER — TREATMENT (OUTPATIENT)
Dept: PHYSICAL THERAPY | Facility: CLINIC | Age: 59
End: 2024-09-03
Payer: COMMERCIAL

## 2024-09-03 DIAGNOSIS — M25.512 LEFT SHOULDER PAIN, UNSPECIFIED CHRONICITY: ICD-10-CM

## 2024-09-03 PROCEDURE — 97110 THERAPEUTIC EXERCISES: CPT | Mod: GP,CQ

## 2024-09-03 NOTE — PROGRESS NOTES
Physical Therapy Treatment    Patient Name: Allen Vela  MRN: [id number]  Today's Date: 9/3/2024  Time Calculation  Start Time: 1300  Stop Time: 1330  Time Calculation (min): 30 min  PT Therapeutic Procedures Time Entry  Therapeutic Exercise Time Entry: 30    Insurance:  Visit number: 4 of 8  Authorization info:  EVAL ONLY - [employer] / AUTH REQ / 100% COVERAGE / AVAILITY [id number] / ds 8/14/24.   Insurance [facility]: [facility] / Plan: [employer]OURCE / Product Type: *No Product type* /     Current Problem   1. Left shoulder pain, unspecified chronicity  Follow Up In Physical Therapy          Subjective   General    Pt arrived 15' late but able to accommodate.  Precautions:   L R TSR  Pain    5  Post Treatment Pain Level same    Objective   L shoulder AAROM 135 on pulleys    Treatments:  Therapeutic Exercise:   UBE  Slant board L3 #2  Scap add with BTB x 25  Scap/deltoid isometrics x 10 each            Assessment   Assessment:    Pt needed minimal cueing for good form with new there ex.    Plan:    Continue with POC    OP EDUCATION:   Added to HEP    Goals:   Active       PT Problem       Pt will demonstrate 4+/5 B/L UE  strength  (Progressing)       Start:  08/16/24    Expected End:  11/14/24            Pt will demonstrate > 140 deg of Flexion AROM to assist with OH elevation activities  (Progressing)       Start:  08/16/24    Expected End:  11/14/24            pt will report no greater than 2/10 pain for 3 consecutive days   (Progressing)       Start:  08/16/24    Expected End:  11/14/24            Pt will be indep with HEP   (Progressing)       Start:  08/16/24    Expected End:  11/14/24

## 2024-09-06 ENCOUNTER — APPOINTMENT (OUTPATIENT)
Dept: PHYSICAL THERAPY | Facility: CLINIC | Age: 59
End: 2024-09-06
Payer: COMMERCIAL

## 2024-09-09 ENCOUNTER — TREATMENT (OUTPATIENT)
Dept: PHYSICAL THERAPY | Facility: CLINIC | Age: 59
End: 2024-09-09
Payer: COMMERCIAL

## 2024-09-09 DIAGNOSIS — M25.512 LEFT SHOULDER PAIN, UNSPECIFIED CHRONICITY: ICD-10-CM

## 2024-09-09 PROCEDURE — 97110 THERAPEUTIC EXERCISES: CPT | Mod: GP

## 2024-09-09 PROCEDURE — 97140 MANUAL THERAPY 1/> REGIONS: CPT | Mod: GP

## 2024-09-09 NOTE — PROGRESS NOTES
Physical Therapy Treatment    Patient Name: Allen Vela  MRN: [id number]  Today's Date: 9/9/2024  Time Calculation  Start Time: 1500  Stop Time: 1540  Time Calculation (min): 40 min  PT Therapeutic Procedures Time Entry  Therapeutic Exercise Time Entry: 15  Manual: 25 mins     Insurance:  Visit number: 4 of 8  Authorization info:  EVAL ONLY - [employer] / AUTH REQ / 100% COVERAGE / AVAILITY [id number] / ds 8/14/24.   Insurance Type: Payor: [facility] / Plan: [employer] / Product Type: *No Product type* /     Current Problem   1. Left shoulder pain, unspecified chronicity  Follow Up In Physical Therapy          Subjective   General    Pt reports he was doing pulleys at home, slightly tweaked shoulder over the weekend but is feeling better today   Precautions:   L R TSR  Pain    5  Post Treatment Pain Level same    Objective   TrP + tenderness noted along anterior shoulder   AROM limited 0-90 deg AROM     Treatments:  Dowel nieves press 3 x 10  Dowel nieves flexion 3 x10  Dowel nieves ER 3 x10  Green tband rows 3 x10  Green tband ER Walkouts 3 x10     Manual:  Manual Therapy  Manual Therapy Performed: Yes  Manual Therapy Activity 1: Mild axial distraction with oscillarions  Manual Therapy Activity 2: Gr 2 GH mobes post and inf  Manual Therapy Activity 3: PROM to tolerance flexion, scaption, abduction, ER, with stretches at end range.   Manual ER Iso 3 x 20s holds  Rhythmic stabilization flexion 5 x 20s        Assessment   Assessment:   Emphasis of todays treatment was to focus on Manual to help improve ROM and decrease pain, in addition to therex to help with stability and increase general strength. Pt tolerated session well,  Pt will cont to progress with skilled PT to continue to address above impairments       Plan:    Continue with POC    OP EDUCATION:   Added to HEP    Goals:   Active       PT Problem       Pt will demonstrate 4+/5 B/L UE  strength  (Progressing)       Start:  08/16/24    Expected End:  11/14/24             Pt will demonstrate > 140 deg of Flexion AROM to assist with OH elevation activities  (Progressing)       Start:  08/16/24    Expected End:  11/14/24            pt will report no greater than 2/10 pain for 3 consecutive days   (Progressing)       Start:  08/16/24    Expected End:  11/14/24            Pt will be indep with HEP   (Progressing)       Start:  08/16/24    Expected End:  11/14/24

## 2024-09-13 ENCOUNTER — APPOINTMENT (OUTPATIENT)
Dept: PHYSICAL THERAPY | Facility: CLINIC | Age: 59
End: 2024-09-13
Payer: COMMERCIAL

## 2024-09-16 ENCOUNTER — APPOINTMENT (OUTPATIENT)
Dept: SLEEP MEDICINE | Facility: CLINIC | Age: 59
End: 2024-09-16
Payer: COMMERCIAL

## 2024-09-17 ENCOUNTER — TREATMENT (OUTPATIENT)
Dept: PHYSICAL THERAPY | Facility: CLINIC | Age: 59
End: 2024-09-17
Payer: COMMERCIAL

## 2024-09-17 DIAGNOSIS — M25.512 LEFT SHOULDER PAIN, UNSPECIFIED CHRONICITY: ICD-10-CM

## 2024-09-17 PROCEDURE — 97140 MANUAL THERAPY 1/> REGIONS: CPT | Mod: GP

## 2024-09-17 PROCEDURE — 97110 THERAPEUTIC EXERCISES: CPT | Mod: GP

## 2024-09-18 NOTE — PROGRESS NOTES
Physical Therapy Treatment    Patient Name: Allen Vela  MRN: [id number]  Today's Date: 9/17/2024  Time Calculation  Start Time: 1515  Stop Time: 1555  Time Calculation (min): 40 min  PT Therapeutic Procedures Time Entry  Therapeutic Exercise Time Entry: 25  Manual: 15  mins     Insurance:  Visit number: 5 of 8  EVAL ONLY-Auth Rcvd 8 visits 8/21-11/30 CPTs [id number]     Current Problem   1. Left shoulder pain, unspecified chronicity  Follow Up In Physical Therapy          Subjective   General    Pt reports he is experiencing general soreness in L shoulder   Precautions:   L R TSR  Pain    5  Post Treatment Pain Level same    Objective   TrP + tenderness noted along anterior shoulder   AAROM limited 0-125 deg AROM     Treatments:  Ube x 4 mins  Incline towel slide progression  Pulleys  Dowel nieves press 3 x 10  Dowel nieves flexion 3 x10  Dowel nieves ER 3 x10  Green tband rows 3 x10  Green tband ER Walkouts 3 x10  Green tband flexion walkouts 2x 8  Green tband punches 2 x 8     Manual:  Manual Therapy  Manual Therapy Performed: Yes  Manual Therapy Activity 1: Mild axial distraction with oscillarions  Manual Therapy Activity 2: Gr 2 GH mobes post and inf  Manual Therapy Activity 3: PROM to tolerance flexion, scaption, abduction, ER, with stretches at end range.   Manual ER Iso 3 x 20s holds  Rhythmic stabilization flexion 5 x 20s        Assessment   Assessment:   Emphasis of todays treatment was to focus on Manual to help improve ROM and decrease pain, in addition to therex to help with stability and increase general strength. Pt tolerated session well,  Pt will cont to progress with skilled PT to continue to address above impairments       Plan:    Continue with POC    OP EDUCATION:   Added to HEP    Goals:   Active       PT Problem       Pt will demonstrate 4+/5 B/L UE  strength  (Progressing)       Start:  08/16/24    Expected End:  11/14/24            Pt will demonstrate > 140 deg of Flexion AROM to assist  with OH elevation activities  (Progressing)       Start:  08/16/24    Expected End:  11/14/24            pt will report no greater than 2/10 pain for 3 consecutive days   (Progressing)       Start:  08/16/24    Expected End:  11/14/24            Pt will be indep with HEP   (Progressing)       Start:  08/16/24    Expected End:  11/14/24

## 2024-09-23 ENCOUNTER — APPOINTMENT (OUTPATIENT)
Dept: PHYSICAL THERAPY | Facility: CLINIC | Age: 59
End: 2024-09-23
Payer: COMMERCIAL

## 2024-09-26 ENCOUNTER — TREATMENT (OUTPATIENT)
Dept: PHYSICAL THERAPY | Facility: CLINIC | Age: 59
End: 2024-09-26
Payer: COMMERCIAL

## 2024-09-26 DIAGNOSIS — M25.512 LEFT SHOULDER PAIN, UNSPECIFIED CHRONICITY: ICD-10-CM

## 2024-09-26 PROCEDURE — 97110 THERAPEUTIC EXERCISES: CPT | Mod: GP,CQ

## 2024-09-26 ASSESSMENT — PAIN SCALES - GENERAL: PAINLEVEL_OUTOF10: 6

## 2024-09-26 NOTE — PROGRESS NOTES
"Physical Therapy Treatment    Patient Name: Allen Vela  MRN: [id number]  Today's Date: 10/1/2024  Time Calculation  Start Time: 1517  Stop Time: 1605  Time Calculation (min): 48 min  PT Therapeutic Procedures Time Entry  Therapeutic Exercise Time Entry: 42    Insurance:  Visit number: 6 of 8  Authorization info:  EVAL ONLY - [employer] / AUTH REQ / 100% COVERAGE / AVAILITY [id number] / ds 8/14/24.   Ins[facility]: [facility] / Plan: [employer]OURCE / Product Type: *No Product type* /     Current Problem   1. Left shoulder pain, unspecified chronicity  Follow Up In Physical Therapy          Subjective   General   Reason for Referral: 1. Left shoulder pain, unspecified chronicity  Referral to Physical Therapy    Follow Up In Physical Therapy     2. Aftercare following left shoulder joint replacement surgery  Referral to Physical Therapy  Referred By: Tanner Padilla MD  General Comment: Pt reports moderate pain L shoulder on arrival.  Precautions:  Precautions  Post-Surgical Precautions: Left shoulder precautions  Precautions Comment: L reverse TSA, S/P 7/01/24  Pain   0-10 (Numeric) Pain Score: 6  Pain Type: Surgical pain  Pain Location: Shoulder  Pain Orientation: Left  Post Treatment Pain Level 4/10    Objective   Pt requires occasional postural cueing throughout ther ex activities.    Treatments:  Therapeutic Exercise:  Therapeutic Exercise  Therapeutic Exercise Performed: Yes  Therapeutic Exercise Activity 1: UBE BWD/FWD 2' each  Therapeutic Exercise Activity 2: Pulleys flexion 5\" hold 2x10  Therapeutic Exercise Activity 3: Pulleys scaption 5\" hold 2x10  Therapeutic Exercise Activity 4: Wall slides 2x10  Therapeutic Exercise Activity 5: Scap retractions 5\" hold 2x10  Therapeutic Exercise Activity 6: Rows PTB 2x10  Therapeutic Exercise Activity 7: Sh extension to neutral PTB 2x10  Therapeutic Exercise Activity 8: ER with GTB 2x10  Therapeutic Exercise Activity 9: ER stretch with Dowel nieves 5\" hold " 2x10  Therapeutic Exercise Activity 10: Supine flexion with dowel nieves +3# cuff 2x10  Therapeutic Exercise Activity 11: Bench press with dowel nieves +3# cuff 2x10    Manual:  Manual Therapy  Manual Therapy Performed: No       Assessment   Assessment:   PT Assessment  PT Assessment Results: Decreased strength, Decreased endurance, Decreased range of motion, Pain, Orthopedic restrictions  Rehab Prognosis: Good  Evaluation/Treatment Tolerance: Patient tolerated treatment well  Assessment Comment: Pt demonstrates fair postural awareness during ther ex activites, tolerates treatment with reduced S/S.    Plan:   OP PT Plan  Treatment/Interventions: Blood flow restriction therapy, Education/ Instruction, Electrical stimulation, Hot pack, Manual therapy, Neuromuscular re-education, Therapeutic activities, Therapeutic exercises  PT Plan: Skilled PT  PT Frequency: 2 times per week  Duration: 4 weeks  Onset Date: 07/01/24  Number of Treatments Authorized: eval only  Rehab Potential: Good  Plan of Care Agreement: Patient    OP EDUCATION:  Outpatient Education  Individual(s) Educated: Patient  Education Provided: Body Mechanics  Patient/Caregiver Demonstrated Understanding: yes  Patient Response to Education: Patient/Caregiver Verbalized Understanding of Information, Patient/Caregiver Performed Return Demonstration of Exercises/Activities, Patient/Caregiver Asked Appropriate Questions    Goals:   Active       PT Problem       Pt will demonstrate 4+/5 B/L UE  strength  (Progressing)       Start:  08/16/24    Expected End:  11/14/24            Pt will demonstrate > 140 deg of Flexion AROM to assist with OH elevation activities  (Progressing)       Start:  08/16/24    Expected End:  11/14/24            pt will report no greater than 2/10 pain for 3 consecutive days   (Progressing)       Start:  08/16/24    Expected End:  11/14/24            Pt will be indep with HEP   (Progressing)       Start:  08/16/24    Expected End:  11/14/24

## 2024-10-01 ENCOUNTER — TREATMENT (OUTPATIENT)
Dept: PHYSICAL THERAPY | Facility: CLINIC | Age: 59
End: 2024-10-01
Payer: COMMERCIAL

## 2024-10-01 DIAGNOSIS — M25.512 LEFT SHOULDER PAIN, UNSPECIFIED CHRONICITY: ICD-10-CM

## 2024-10-01 PROCEDURE — 97110 THERAPEUTIC EXERCISES: CPT | Mod: GP,CQ

## 2024-10-01 ASSESSMENT — PAIN SCALES - GENERAL: PAINLEVEL_OUTOF10: 4

## 2024-10-01 NOTE — PROGRESS NOTES
"Physical Therapy Treatment    Patient Name: Allen Vela  MRN: [id number]  Today's Date: 10/1/2024  Time Calculation  Start Time: 1433  Stop Time: 1520  Time Calculation (min): 47 min  PT Therapeutic Procedures Time Entry  Therapeutic Exercise Time Entry: 43    Insurance:  Visit number: 7 of 8  Authorization info: EVAL ONLY - [employer] / AUTH REQ / 100% COVERAGE / [employer] [id number] / ds 8/14/24.   Insurance Type: Payor: [facility] / Plan: [employer] / Product Type: *No Product type* /     Current Problem   1. Left shoulder pain, unspecified chronicity  Follow Up In Physical Therapy          Subjective   General   Reason for Referral: 1. Left shoulder pain, unspecified chronicity  Referral to Physical Therapy    Follow Up In Physical Therapy     2. Aftercare following left shoulder joint replacement surgery  Referral to Physical Therapy  Referred By: Tanner Padilla MD  General Comment: Pt reports decreased pain level on arrival this visit.  Precautions:  Precautions  Post-Surgical Precautions: Left shoulder precautions  Pain   0-10 (Numeric) Pain Score: 4  Pain Type: Surgical pain  Pain Location: Shoulder  Pain Orientation: Left  Post Treatment Pain Level 4/10    Objective   PROM L shoulder flexion 123 degrees, abduction 110 degrees.    Treatments:  Therapeutic Exercise:  Therapeutic Exercise  Therapeutic Exercise Performed: Yes  Therapeutic Exercise Activity 1: UBE BWD/FWD 2' each  Therapeutic Exercise Activity 2: Pulleys flexion 5\" hold 2x10  Therapeutic Exercise Activity 3: Pulleys scaption 5\" hold 2x10  Therapeutic Exercise Activity 4: Slide board L3 2x10  Therapeutic Exercise Activity 5: Scap retractions 5\" hold 2x10  Therapeutic Exercise Activity 6: Rows new green band 2x10  Therapeutic Exercise Activity 7: Sh extension to neutral new green band 2x10  Therapeutic Exercise Activity 8: ER with GTB 2x10  Therapeutic Exercise Activity 9: Sidelying ER with 1# weight  Therapeutic Exercise Activity 10: " Supine flexion with dowel nieves +3# cuff 2x10  Therapeutic Exercise Activity 11: Bench press with dowel nieves +3# cuff 2x10    Manual:  Manual Therapy  Manual Therapy Performed: No       Assessment   Assessment:   PT Assessment  PT Assessment Results: Decreased strength, Decreased endurance, Decreased range of motion, Pain, Orthopedic restrictions  Rehab Prognosis: Good  Evaluation/Treatment Tolerance: Patient tolerated treatment well  Assessment Comment: Pt tolerates [id number] without increased L shoulder S/S, demonstrates improvede PROM as noted    Plan:   OP PT Plan  Treatment/Interventions: Blood flow restriction therapy, Education/ Instruction, Electrical stimulation, Hot pack, Manual therapy, Neuromuscular re-education, Therapeutic activities, Therapeutic exercises  PT Plan: Skilled PT  PT Frequency: 2 times per week  Duration: 4 weeks  Onset Date: 07/01/24  Number of Treatments Authorized: eval only  Rehab Potential: Good  Plan of Care Agreement: Patient    OP EDUCATION:  Outpatient Education  Individual(s) Educated: Patient  Education Provided: Body Mechanics  Patient/Caregiver Demonstrated Understanding: yes  Patient Response to Education: Patient/Caregiver Verbalized Understanding of Information, Patient/Caregiver Performed Return Demonstration of Exercises/Activities, Patient/Caregiver Asked Appropriate Questions    Goals:   Active       PT Problem       Pt will demonstrate 4+/5 B/L UE  strength  (Progressing)       Start:  08/16/24    Expected End:  11/14/24            Pt will demonstrate > 140 deg of Flexion AROM to assist with OH elevation activities  (Progressing)       Start:  08/16/24    Expected End:  11/14/24            pt will report no greater than 2/10 pain for 3 consecutive days   (Progressing)       Start:  08/16/24    Expected End:  11/14/24            Pt will be indep with HEP   (Progressing)       Start:  08/16/24    Expected End:  11/14/24

## 2024-10-03 ENCOUNTER — TREATMENT (OUTPATIENT)
Dept: PHYSICAL THERAPY | Facility: CLINIC | Age: 59
End: 2024-10-03
Payer: COMMERCIAL

## 2024-10-03 DIAGNOSIS — M25.512 LEFT SHOULDER PAIN, UNSPECIFIED CHRONICITY: ICD-10-CM

## 2024-10-03 PROCEDURE — 97110 THERAPEUTIC EXERCISES: CPT | Mod: GP,CQ

## 2024-10-03 ASSESSMENT — PAIN SCALES - GENERAL: PAINLEVEL_OUTOF10: 2

## 2024-10-03 NOTE — PROGRESS NOTES
"Physical Therapy Treatment    Patient Name: Allen Vela  MRN: [id number]  Today's Date: 10/3/2024  Time Calculation  Start Time: 1433  Stop Time: 1517  Time Calculation (min): 44 min  PT Therapeutic Procedures Time Entry  Therapeutic Exercise Time Entry: 42    Insurance:  Visit number: 8 of 8  Authorization info:  EVAL ONLY - [employer] / AUTH REQ / 100% COVERAGE / [employer] [id number] / ds 8/14/24.   Insurance Type: Payor: [facility] / Plan: [employer] / Product Type: *No Product type* /     Current Problem   1. Left shoulder pain, unspecified chronicity  Follow Up In Physical Therapy          Subjective   General   Reason for Referral: 1. Left shoulder pain, unspecified chronicity  Referral to Physical Therapy    Follow Up In Physical Therapy     2. Aftercare following left shoulder joint replacement surgery  Referral to Physical Therapy  Referred By: Tanner Padilla MD  General Comment: Pt reports mild L shoulder S/S on arrival.  Precautions:  Precautions  Post-Surgical Precautions: Left shoulder precautions  Precautions Comment: L reverse TSA, S/P 7/01/24  Pain   0-10 (Numeric) Pain Score: 2  Pain Type: Surgical pain  Pain Location: Shoulder  Pain Orientation: Left  Post Treatment Pain Level 2/10    Objective   Improving postural control observed during scap stabilization activities.    Treatments:  Therapeutic Exercise:  Therapeutic Exercise  Therapeutic Exercise Performed: Yes  Therapeutic Exercise Activity 1: UBE BWD/FWD 2' each  Therapeutic Exercise Activity 2: Pulleys flexion 5\" hold 2x10  Therapeutic Exercise Activity 3: Pulleys scaption 5\" hold 2x10  Therapeutic Exercise Activity 4: Slide board L4 2x10  Therapeutic Exercise Activity 5: Scap retractions 5\" hold 2x10  Therapeutic Exercise Activity 6: Rows new green band 2x10  Therapeutic Exercise Activity 7: Sh extension to neutral new green band 2x10  Therapeutic Exercise Activity 8: ER with GTB 2x10  Therapeutic Exercise Activity 9: Sidelying ER " with 1# weight  Therapeutic Exercise Activity 10: Supine AROM flexion  2#, 2x10  Therapeutic Exercise Activity 11: Bench press with 3# 2x10    Manual:  Manual Therapy  Manual Therapy Performed: No       Assessment   Assessment:   PT Assessment  PT Assessment Results: Decreased strength, Decreased endurance, Decreased range of motion, Pain, Orthopedic restrictions  Rehab Prognosis: Good  Evaluation/Treatment Tolerance: Patient tolerated treatment well  Assessment Comment: Pt tolerates progressions to resisted supine elevation activites without increased pain level.    Plan:   OP PT Plan  Treatment/Interventions: Blood flow restriction therapy, Education/ Instruction, Electrical stimulation, Hot pack, Manual therapy, Neuromuscular re-education, Therapeutic activities, Therapeutic exercises  PT Plan: Skilled PT  PT Frequency: 2 times per week  Duration: 4 weeks  Onset Date: 07/01/24  Number of Treatments Authorized: eval only  Rehab Potential: Good  Plan of Care Agreement: Patient    OP EDUCATION:  Outpatient Education  Individual(s) Educated: Patient  Education Provided: Anatomy, Body Mechanics  Patient/Caregiver Demonstrated Understanding: yes  Patient Response to Education: Patient/Caregiver Verbalized Understanding of Information, Patient/Caregiver Performed Return Demonstration of Exercises/Activities, Patient/Caregiver Asked Appropriate Questions    Goals:   Active       PT Problem       Pt will demonstrate 4+/5 B/L UE  strength  (Progressing)       Start:  08/16/24    Expected End:  11/14/24            Pt will demonstrate > 140 deg of Flexion AROM to assist with OH elevation activities  (Progressing)       Start:  08/16/24    Expected End:  11/14/24            pt will report no greater than 2/10 pain for 3 consecutive days   (Progressing)       Start:  08/16/24    Expected End:  11/14/24            Pt will be indep with HEP   (Progressing)       Start:  08/16/24    Expected End:  11/14/24

## 2024-10-08 ENCOUNTER — APPOINTMENT (OUTPATIENT)
Dept: PHYSICAL THERAPY | Facility: CLINIC | Age: 59
End: 2024-10-08
Payer: COMMERCIAL

## 2024-10-09 ENCOUNTER — APPOINTMENT (OUTPATIENT)
Dept: CARDIOLOGY | Facility: HOSPITAL | Age: 59
End: 2024-10-09
Payer: COMMERCIAL

## 2024-10-09 ENCOUNTER — HOSPITAL ENCOUNTER (EMERGENCY)
Facility: HOSPITAL | Age: 59
Discharge: HOME | End: 2024-10-09
Payer: COMMERCIAL

## 2024-10-09 ENCOUNTER — APPOINTMENT (OUTPATIENT)
Dept: RADIOLOGY | Facility: HOSPITAL | Age: 59
End: 2024-10-09
Payer: COMMERCIAL

## 2024-10-09 VITALS
RESPIRATION RATE: 18 BRPM | HEART RATE: 71 BPM | BODY MASS INDEX: 39.4 KG/M2 | SYSTOLIC BLOOD PRESSURE: 129 MMHG | HEIGHT: 68 IN | TEMPERATURE: 96.8 F | WEIGHT: 260 LBS | DIASTOLIC BLOOD PRESSURE: 83 MMHG | OXYGEN SATURATION: 100 %

## 2024-10-09 DIAGNOSIS — R06.02 SHORTNESS OF BREATH: ICD-10-CM

## 2024-10-09 DIAGNOSIS — J44.1 COPD EXACERBATION (MULTI): Primary | ICD-10-CM

## 2024-10-09 LAB
ALBUMIN SERPL BCP-MCNC: 4.1 G/DL (ref 3.4–5)
ALP SERPL-CCNC: 53 U/L (ref 33–136)
ALT SERPL W P-5'-P-CCNC: 21 U/L (ref 10–52)
ANION GAP SERPL CALCULATED.3IONS-SCNC: 12 MMOL/L (ref 10–20)
APPEARANCE UR: CLEAR
AST SERPL W P-5'-P-CCNC: 22 U/L (ref 9–39)
BASOPHILS # BLD AUTO: 0 X10*3/UL (ref 0–0.1)
BASOPHILS NFR BLD AUTO: 0 %
BILIRUB SERPL-MCNC: 0.6 MG/DL (ref 0–1.2)
BILIRUB UR STRIP.AUTO-MCNC: NEGATIVE MG/DL
BNP SERPL-MCNC: 76 PG/ML (ref 0–99)
BUN SERPL-MCNC: 18 MG/DL (ref 6–23)
CALCIUM SERPL-MCNC: 9.3 MG/DL (ref 8.6–10.3)
CARDIAC TROPONIN I PNL SERPL HS: 4 NG/L (ref 0–20)
CARDIAC TROPONIN I PNL SERPL HS: 4 NG/L (ref 0–20)
CHLORIDE SERPL-SCNC: 105 MMOL/L (ref 98–107)
CO2 SERPL-SCNC: 27 MMOL/L (ref 21–32)
COLOR UR: NORMAL
CREAT SERPL-MCNC: 2.06 MG/DL (ref 0.5–1.3)
EGFRCR SERPLBLD CKD-EPI 2021: 36 ML/MIN/1.73M*2
EOSINOPHIL # BLD AUTO: 0 X10*3/UL (ref 0–0.7)
EOSINOPHIL NFR BLD AUTO: 0 %
ERYTHROCYTE [DISTWIDTH] IN BLOOD BY AUTOMATED COUNT: 15.4 % (ref 11.5–14.5)
GLUCOSE SERPL-MCNC: 126 MG/DL (ref 74–99)
GLUCOSE UR STRIP.AUTO-MCNC: NORMAL MG/DL
HCT VFR BLD AUTO: 35.5 % (ref 41–52)
HGB BLD-MCNC: 11.5 G/DL (ref 13.5–17.5)
IMM GRANULOCYTES # BLD AUTO: 0.02 X10*3/UL (ref 0–0.7)
IMM GRANULOCYTES NFR BLD AUTO: 0.4 % (ref 0–0.9)
KETONES UR STRIP.AUTO-MCNC: NEGATIVE MG/DL
LEUKOCYTE ESTERASE UR QL STRIP.AUTO: NEGATIVE
LYMPHOCYTES # BLD AUTO: 0.65 X10*3/UL (ref 1.2–4.8)
LYMPHOCYTES NFR BLD AUTO: 14.2 %
MCH RBC QN AUTO: 34.2 PG (ref 26–34)
MCHC RBC AUTO-ENTMCNC: 32.4 G/DL (ref 32–36)
MCV RBC AUTO: 106 FL (ref 80–100)
MONOCYTES # BLD AUTO: 0.39 X10*3/UL (ref 0.1–1)
MONOCYTES NFR BLD AUTO: 8.5 %
NEUTROPHILS # BLD AUTO: 3.51 X10*3/UL (ref 1.2–7.7)
NEUTROPHILS NFR BLD AUTO: 76.9 %
NITRITE UR QL STRIP.AUTO: NEGATIVE
NRBC BLD-RTO: 0 /100 WBCS (ref 0–0)
PH UR STRIP.AUTO: 7 [PH]
PLATELET # BLD AUTO: 273 X10*3/UL (ref 150–450)
POTASSIUM SERPL-SCNC: 5 MMOL/L (ref 3.5–5.3)
PROT SERPL-MCNC: 7 G/DL (ref 6.4–8.2)
PROT UR STRIP.AUTO-MCNC: NEGATIVE MG/DL
RBC # BLD AUTO: 3.36 X10*6/UL (ref 4.5–5.9)
RBC # UR STRIP.AUTO: NEGATIVE /UL
SARS-COV-2 RNA RESP QL NAA+PROBE: NOT DETECTED
SODIUM SERPL-SCNC: 139 MMOL/L (ref 136–145)
SP GR UR STRIP.AUTO: 1.01
UROBILINOGEN UR STRIP.AUTO-MCNC: NORMAL MG/DL
WBC # BLD AUTO: 4.6 X10*3/UL (ref 4.4–11.3)

## 2024-10-09 PROCEDURE — 36415 COLL VENOUS BLD VENIPUNCTURE: CPT | Performed by: PHYSICIAN ASSISTANT

## 2024-10-09 PROCEDURE — 87635 SARS-COV-2 COVID-19 AMP PRB: CPT | Performed by: PHYSICIAN ASSISTANT

## 2024-10-09 PROCEDURE — 85025 COMPLETE CBC W/AUTO DIFF WBC: CPT | Performed by: PHYSICIAN ASSISTANT

## 2024-10-09 PROCEDURE — 93005 ELECTROCARDIOGRAM TRACING: CPT

## 2024-10-09 PROCEDURE — 81003 URINALYSIS AUTO W/O SCOPE: CPT | Performed by: PHYSICIAN ASSISTANT

## 2024-10-09 PROCEDURE — 84484 ASSAY OF TROPONIN QUANT: CPT | Performed by: PHYSICIAN ASSISTANT

## 2024-10-09 PROCEDURE — 83880 ASSAY OF NATRIURETIC PEPTIDE: CPT | Performed by: PHYSICIAN ASSISTANT

## 2024-10-09 PROCEDURE — 71046 X-RAY EXAM CHEST 2 VIEWS: CPT | Performed by: RADIOLOGY

## 2024-10-09 PROCEDURE — 80053 COMPREHEN METABOLIC PANEL: CPT | Performed by: PHYSICIAN ASSISTANT

## 2024-10-09 PROCEDURE — 99283 EMERGENCY DEPT VISIT LOW MDM: CPT

## 2024-10-09 PROCEDURE — 71046 X-RAY EXAM CHEST 2 VIEWS: CPT

## 2024-10-09 PROCEDURE — 94640 AIRWAY INHALATION TREATMENT: CPT

## 2024-10-09 PROCEDURE — 2500000002 HC RX 250 W HCPCS SELF ADMINISTERED DRUGS (ALT 637 FOR MEDICARE OP, ALT 636 FOR OP/ED): Performed by: PHYSICIAN ASSISTANT

## 2024-10-09 RX ORDER — METHYLPREDNISOLONE 4 MG/1
TABLET ORAL
Qty: 21 TABLET | Refills: 0 | Status: SHIPPED | OUTPATIENT
Start: 2024-10-09 | End: 2024-10-16

## 2024-10-09 RX ORDER — IPRATROPIUM BROMIDE AND ALBUTEROL SULFATE 2.5; .5 MG/3ML; MG/3ML
3 SOLUTION RESPIRATORY (INHALATION) ONCE
Status: COMPLETED | OUTPATIENT
Start: 2024-10-09 | End: 2024-10-09

## 2024-10-09 ASSESSMENT — PAIN SCALES - GENERAL: PAINLEVEL_OUTOF10: 4

## 2024-10-09 ASSESSMENT — LIFESTYLE VARIABLES
EVER FELT BAD OR GUILTY ABOUT YOUR DRINKING: NO
EVER HAD A DRINK FIRST THING IN THE MORNING TO STEADY YOUR NERVES TO GET RID OF A HANGOVER: NO
HAVE PEOPLE ANNOYED YOU BY CRITICIZING YOUR DRINKING: NO
HAVE YOU EVER FELT YOU SHOULD CUT DOWN ON YOUR DRINKING: NO
TOTAL SCORE: 0

## 2024-10-09 ASSESSMENT — PAIN - FUNCTIONAL ASSESSMENT: PAIN_FUNCTIONAL_ASSESSMENT: 0-10

## 2024-10-09 ASSESSMENT — COLUMBIA-SUICIDE SEVERITY RATING SCALE - C-SSRS
2. HAVE YOU ACTUALLY HAD ANY THOUGHTS OF KILLING YOURSELF?: NO
6. HAVE YOU EVER DONE ANYTHING, STARTED TO DO ANYTHING, OR PREPARED TO DO ANYTHING TO END YOUR LIFE?: NO
1. IN THE PAST MONTH, HAVE YOU WISHED YOU WERE DEAD OR WISHED YOU COULD GO TO SLEEP AND NOT WAKE UP?: NO

## 2024-10-09 ASSESSMENT — PAIN DESCRIPTION - PAIN TYPE: TYPE: ACUTE PAIN

## 2024-10-09 NOTE — ED PROVIDER NOTES
HPI   Chief Complaint   Patient presents with    Shortness of Breath     SOB, cough, congestion, body aches and fevers x1 week.        59-year-old male with history of COPD presented emergency department the chief complaint of shortness of breath.  He has some burning in the chest but no chest pressure.  He does breathing treatments at home.  He states he had some lower extremity edema but that is since resolved.  He denies lightheadedness dizziness numbness weakness.  Well-appearing nontoxic afebrile.  Vital signs within normal limits.  No other complaint.              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 Bal new nodule on today's CT -will await radiology read for final recommendations for repeat    Lupus (systemic lupus erythematosus) (Multi)     Dr. Manley 3/20/24 Currently, no active lupus findings.    Sleep apnea     unable to tolerate CPAP    Torn rotator cuff      Past Surgical History:   Procedure Laterality Date    COLONOSCOPY      LUMBAR EPIDURAL INJECTION  05/14/2024    WISDOM TOOTH EXTRACTION      as a teen     Family History   Problem Relation Name Age of Onset    COPD Mother      Heart failure Mother      Heart failure Father      No Known Problems Sister      Lupus Sister          skin    No Known Problems Sister      No Known Problems Sister      No Known Problems Brother      Diabetes Maternal Grandmother       Social History     Tobacco Use    Smoking status: Former     Average packs/day: 0.5 packs/day for 20.0 years (10.0 ttl pk-yrs)     Types: Cigarettes     Start date: 1990      Quit date:      Years since quittin.7    Smokeless tobacco: Never    Tobacco comments:     Vaped x ~1 year after quit smoking   Vaping Use    Vaping status: Former   Substance Use Topics    Alcohol use: Yes     Comment: social drinker, 3 beers    Drug use: Never       Physical Exam   ED Triage Vitals [10/09/24 1448]   Temperature Heart Rate Respirations BP   36 °C (96.8 °F) 80 18 154/90      Pulse Ox Temp src Heart Rate Source Patient Position   99 % -- -- Sitting      BP Location FiO2 (%)     Left arm --       Physical Exam  Vitals and nursing note reviewed.   Constitutional:       Appearance: He is well-developed.   HENT:      Head: Normocephalic.   Cardiovascular:      Rate and Rhythm: Normal rate and regular rhythm.   Pulmonary:      Effort: Pulmonary effort is normal.      Comments: Scant wheeze  Musculoskeletal:         General: Normal range of motion.      Cervical back: Normal range of motion.   Skin:     General: Skin is warm.   Neurological:      General: No focal deficit present.      Mental Status: He is alert and oriented to person, place, and time.   Psychiatric:         Mood and Affect: Mood normal.           ED Course & MDM   ED Course as of 10/09/24 1755   Wed Oct 09, 2024   1456 EKG interpreted by me: Normal sinus rhythm, rate 79.  Normal axis.  No ST or T wave abnormalities. [ML]      ED Course User Index  [ML] Louie Giles MD         Diagnoses as of 10/09/24 175   COPD exacerbation (Multi)   Shortness of breath                 No data recorded                                 Medical Decision Making  I have seen and evaluated this patient.  Physician available for consultation.  Vital signs have been reviewed.  All laboratory and diagnostic imaging is reviewed by myself and interpreted by myself unless otherwise stated.  Additionally imaging is interpreted by radiologist.    CBC without significant leukocytosis or anemia, metabolic panel without significant renal impairment or  electrolyte abnormality.  Troponin within an appropriate range without significant delta change, chest x-ray without actionable cardiopulmonary process, EKG without evidence of acute ischemia.  Patient improved after treatment in emergency department most consistent with COPD exacerbation.  Will continue on steroid.  Outpatient follow-up.  Released in stable condition.          Labs Reviewed   CBC WITH AUTO DIFFERENTIAL - Abnormal       Result Value    WBC 4.6      nRBC 0.0      RBC 3.36 (*)     Hemoglobin 11.5 (*)     Hematocrit 35.5 (*)      (*)     MCH 34.2 (*)     MCHC 32.4      RDW 15.4 (*)     Platelets 273      Neutrophils % 76.9      Immature Granulocytes %, Automated 0.4      Lymphocytes % 14.2      Monocytes % 8.5      Eosinophils % 0.0      Basophils % 0.0      Neutrophils Absolute 3.51      Immature Granulocytes Absolute, Automated 0.02      Lymphocytes Absolute 0.65 (*)     Monocytes Absolute 0.39      Eosinophils Absolute 0.00      Basophils Absolute 0.00     COMPREHENSIVE METABOLIC PANEL - Abnormal    Glucose 126 (*)     Sodium 139      Potassium 5.0      Chloride 105      Bicarbonate 27      Anion Gap 12      Urea Nitrogen 18      Creatinine 2.06 (*)     eGFR 36 (*)     Calcium 9.3      Albumin 4.1      Alkaline Phosphatase 53      Total Protein 7.0      AST 22      Bilirubin, Total 0.6      ALT 21     B-TYPE NATRIURETIC PEPTIDE - Normal    BNP 76      Narrative:        <100 pg/mL - Heart failure unlikely  100-299 pg/mL - Intermediate probability of acute heart                  failure exacerbation. Correlate with clinical                  context and patient history.    >=300 pg/mL - Heart Failure likely. Correlate with clinical                  context and patient history.    BNP testing is performed using different testing methodology at [facility] than at other [facility] hospitals. Direct result comparisons should only be made within the same method.      SARS-COV-2 PCR - Normal     Coronavirus 2019, PCR Not Detected      Narrative:     This assay has received FDA Emergency Use Authorization (EUA) and is only authorized for the duration of time that circumstances exist to justify the authorization of the emergency use of in vitro diagnostic tests for the detection of SARS-CoV-2 virus and/or diagnosis of COVID-19 infection under section 564(b)(1) of the Act, 21 U.S.C. 360bbb-3(b)(1). This assay is an in vitro diagnostic nucleic acid amplification test for the qualitative detection of SARS-CoV-2 from nasopharyngeal specimens and has been validated for use at [facility]. Negative results do not preclude COVID-19 infections and should not be used as the sole basis for diagnosis, treatment, or other management decisions.     SERIAL TROPONIN-INITIAL - Normal    Troponin I, High Sensitivity 4      Narrative:     Less than 99th percentile of normal range cutoff-  Female and children under 18 years old <14 ng/L; Male <21 ng/L: Negative  Repeat testing should be performed if clinically indicated.     Female and children under 18 years old 14-50 ng/L; Male 21-50 ng/L:  Consistent with possible cardiac damage and possible increased clinical   risk. Serial measurements may help to assess extent of myocardial damage.     >50 ng/L: Consistent with cardiac damage, increased clinical risk and  myocardial infarction. Serial measurements may help assess extent of   myocardial damage.      NOTE: Children less than 1 year old may have higher baseline troponin   levels and results should be interpreted in conjunction with the overall   clinical context.     NOTE: Troponin I testing is performed using a different   testing methodology at [facility] than at other   [facility]. Direct result comparisons should only   be made within the same method.   URINALYSIS WITH REFLEX CULTURE AND MICROSCOPIC - Normal    Color, Urine Light-Yellow      Appearance, Urine Clear       Specific Gravity, Urine 1.015      pH, Urine 7.0      Protein, Urine NEGATIVE      Glucose, Urine Normal      Blood, Urine NEGATIVE      Ketones, Urine NEGATIVE      Bilirubin, Urine NEGATIVE      Urobilinogen, Urine Normal      Nitrite, Urine NEGATIVE      Leukocyte Esterase, Urine NEGATIVE     SERIAL TROPONIN, 1 HOUR - Normal    Troponin I, High Sensitivity 4      Narrative:     Less than 99th percentile of normal range cutoff-  Female and children under 18 years old <14 ng/L; Male <21 ng/L: Negative  Repeat testing should be performed if clinically indicated.     Female and children under 18 years old 14-50 ng/L; Male 21-50 ng/L:  Consistent with possible cardiac damage and possible increased clinical   risk. Serial measurements may help to assess extent of myocardial damage.     >50 ng/L: Consistent with cardiac damage, increased clinical risk and  myocardial infarction. Serial measurements may help assess extent of   myocardial damage.      NOTE: Children less than 1 year old may have higher baseline troponin   levels and results should be interpreted in conjunction with the overall   clinical context.     NOTE: Troponin I testing is performed using a different   testing methodology at [facility] than at other   [facility]. Direct result comparisons should only   be made within the same method.   TROPONIN SERIES- (INITIAL, 1 HR)    Narrative:     The following orders were created for panel order Troponin Series, (0, 1 HR).  Procedure                               Abnormality         Status                     ---------                               -----------         ------                     Troponin I, High Sensiti...[[id number]]  Normal              Final result               Troponin, High Sensitivi...[[id number]]  Normal              Final result                 Please view results for these tests on the individual orders.   URINALYSIS WITH REFLEX CULTURE AND MICROSCOPIC    Narrative:      The following orders were created for panel order Urinalysis with Reflex Culture and Microscopic.  Procedure                               Abnormality         Status                     ---------                               -----------         ------                     Urinalysis with Reflex C...[[id number]]  Normal              Final result               Extra Urine Gray Tube[[id number]]                            In process                   Please view results for these tests on the individual orders.   EXTRA URINE GRAY TUBE     XR chest 2 views   Final Result   1.  No evidence of acute cardiopulmonary process.                  MACRO:   None        Signed by: Chato Gasca 10/9/2024 3:31 PM   Dictation workstation:   [id number]        Medications   ipratropium-albuteroL (Duo-Neb) 0.5-2.5 mg/3 mL nebulizer solution 3 mL (3 mL nebulization Given 10/9/24 1520)     New Prescriptions    METHYLPREDNISOLONE (MEDROL DOSPAK) 4 MG TABLETS    Follow schedule on package instructions         Procedure  Procedures     Jules Alvarenga PA-C  10/09/24 [id number]

## 2024-10-10 ENCOUNTER — APPOINTMENT (OUTPATIENT)
Dept: PHYSICAL THERAPY | Facility: CLINIC | Age: 59
End: 2024-10-10
Payer: COMMERCIAL

## 2024-10-10 LAB — HOLD SPECIMEN: NORMAL

## 2024-10-15 ENCOUNTER — APPOINTMENT (OUTPATIENT)
Dept: PHYSICAL THERAPY | Facility: CLINIC | Age: 59
End: 2024-10-15
Payer: COMMERCIAL

## 2024-10-15 LAB
ATRIAL RATE: 79 BPM
P AXIS: 24 DEGREES
P OFFSET: 196 MS
P ONSET: 133 MS
PR INTERVAL: 168 MS
Q ONSET: 217 MS
QRS COUNT: 13 BEATS
QRS DURATION: 84 MS
QT INTERVAL: 356 MS
QTC CALCULATION(BAZETT): 408 MS
QTC FREDERICIA: 390 MS
R AXIS: 14 DEGREES
T AXIS: 45 DEGREES
T OFFSET: 395 MS
VENTRICULAR RATE: 79 BPM

## 2024-10-18 ENCOUNTER — DOCUMENTATION (OUTPATIENT)
Dept: PHYSICAL THERAPY | Facility: CLINIC | Age: 59
End: 2024-10-18
Payer: COMMERCIAL

## 2024-10-18 NOTE — PROGRESS NOTES
Physical Therapy                 Therapy Communication Note    Patient Name: Allen Vela  MRN: [id number]  Department:   Room: Room/bed info not found  Today's Date: 10/18/2024     Discipline: Physical Therapy    Missed Visit Reason:      Missed Time: No Show    Comment:

## 2024-10-23 ENCOUNTER — OFFICE VISIT (OUTPATIENT)
Dept: ORTHOPEDIC SURGERY | Facility: CLINIC | Age: 59
End: 2024-10-23
Payer: COMMERCIAL

## 2024-10-23 ENCOUNTER — HOSPITAL ENCOUNTER (OUTPATIENT)
Dept: RADIOLOGY | Facility: CLINIC | Age: 59
Discharge: HOME | End: 2024-10-23
Payer: COMMERCIAL

## 2024-10-23 DIAGNOSIS — M25.519 SHOULDER PAIN, UNSPECIFIED CHRONICITY, UNSPECIFIED LATERALITY: Primary | ICD-10-CM

## 2024-10-23 DIAGNOSIS — M25.519 SHOULDER PAIN, UNSPECIFIED CHRONICITY, UNSPECIFIED LATERALITY: ICD-10-CM

## 2024-10-23 PROCEDURE — 99213 OFFICE O/P EST LOW 20 MIN: CPT | Performed by: STUDENT IN AN ORGANIZED HEALTH CARE EDUCATION/TRAINING PROGRAM

## 2024-10-23 PROCEDURE — 73030 X-RAY EXAM OF SHOULDER: CPT | Mod: LT

## 2024-10-23 PROCEDURE — 73030 X-RAY EXAM OF SHOULDER: CPT | Mod: LEFT SIDE | Performed by: RADIOLOGY

## 2024-10-23 NOTE — PROGRESS NOTES
History: Patient returns to the office status post L RSA on 3 months ago. Patient has been working with therapy. Still having pain and stiffness. Denies numbness/tingling.     Past medical history, past surgical history, medications, allergies, family history, social history, and review of systems were reviewed today and have been documented separately in this encounter.   A 12 point review of systems was negative other than as stated in the HPI.    Physical Examination:    On exam of the left upper extremity, incisions are well healed, without significant erythema or drainage. Demonstrates full ROM of the elbow/wrist/hand. Neurovascularly intact throughout. AROM of the shoulder today to FF 90, Er 10.     Imaging: Xrs of the operative shoulder were performed today. Implants in stable alignment. No acute complications noted.     Assessment: 3 months s/p L RSA     Plan: Patient progressing slowly after recent surgery. Continue to work with PT as long as they are finding this beneficial, otherwise can do exercises on their own. We will see them back around 6 months postoperatively. All questions answered.  Please get new shoulder Xrs at the next visit.     Dragon software was used to dictate this note, please be aware that minor errors in transcription may be present.    Tanner Padilla MD    Shoulder/Elbow Surgery  [facility] [place]

## 2024-11-02 ENCOUNTER — HOSPITAL ENCOUNTER (OUTPATIENT)
Dept: RADIOLOGY | Facility: HOSPITAL | Age: 59
Discharge: HOME | End: 2024-11-02
Payer: COMMERCIAL

## 2024-11-02 DIAGNOSIS — R91.1 PULMONARY NODULE: ICD-10-CM

## 2024-11-02 PROCEDURE — 71250 CT THORAX DX C-: CPT | Performed by: RADIOLOGY

## 2024-11-02 PROCEDURE — 71250 CT THORAX DX C-: CPT

## 2024-11-11 ENCOUNTER — OFFICE VISIT (OUTPATIENT)
Dept: PULMONOLOGY | Facility: HOSPITAL | Age: 59
End: 2024-11-11
Payer: COMMERCIAL

## 2024-11-11 VITALS
BODY MASS INDEX: 41.97 KG/M2 | HEART RATE: 72 BPM | WEIGHT: 276 LBS | TEMPERATURE: 97 F | DIASTOLIC BLOOD PRESSURE: 77 MMHG | SYSTOLIC BLOOD PRESSURE: 111 MMHG | OXYGEN SATURATION: 98 %

## 2024-11-11 DIAGNOSIS — R91.1 PULMONARY NODULE: ICD-10-CM

## 2024-11-11 DIAGNOSIS — J42 CHRONIC BRONCHITIS, UNSPECIFIED CHRONIC BRONCHITIS TYPE (MULTI): Primary | ICD-10-CM

## 2024-11-11 PROCEDURE — 99214 OFFICE O/P EST MOD 30 MIN: CPT | Performed by: NURSE PRACTITIONER

## 2024-11-11 PROCEDURE — 1036F TOBACCO NON-USER: CPT | Performed by: NURSE PRACTITIONER

## 2024-11-11 PROCEDURE — 3078F DIAST BP <80 MM HG: CPT | Performed by: NURSE PRACTITIONER

## 2024-11-11 PROCEDURE — 3074F SYST BP LT 130 MM HG: CPT | Performed by: NURSE PRACTITIONER

## 2024-11-11 RX ORDER — PREDNISONE 10 MG/1
TABLET ORAL
Qty: 30 TABLET | Refills: 0 | Status: SHIPPED | OUTPATIENT
Start: 2024-11-11 | End: 2024-12-11

## 2024-11-11 ASSESSMENT — ENCOUNTER SYMPTOMS
RHINORRHEA: 0
NERVOUS/ANXIOUS: 1
VOMITING: 0
PALPITATIONS: 0
NUMBNESS: 0
WEAKNESS: 0
NAUSEA: 0
ABDOMINAL PAIN: 0
DIZZINESS: 0
HEADACHES: 0
AGITATION: 0
FEVER: 0
SINUS PRESSURE: 0
BACK PAIN: 0
MYALGIAS: 1
JOINT SWELLING: 0
SLEEP DISTURBANCE: 1
DIARRHEA: 0
ARTHRALGIAS: 0
EYE PAIN: 0
FATIGUE: 1
VOICE CHANGE: 0

## 2024-11-11 ASSESSMENT — PAIN SCALES - GENERAL: PAINLEVEL_OUTOF10: 0-NO PAIN

## 2024-11-11 ASSESSMENT — LIFESTYLE VARIABLES
HOW OFTEN DO YOU HAVE A DRINK CONTAINING ALCOHOL: MONTHLY OR LESS
HOW MANY STANDARD DRINKS CONTAINING ALCOHOL DO YOU HAVE ON A TYPICAL DAY: 1 OR 2

## 2024-11-11 NOTE — PROGRESS NOTES
Patient: Allen Vela    [id number]  : 1965 -- AGE 59 y.o.    Provider: KERVIN Emanuel-CNP     Location [facility]   Service Date: 2024              [facility]  Follow up Visit Note      HISTORY OF PRESENT ILLNESS     The patient's referring provider is: Danny Balderrama DO    HISTORY OF PRESENT ILLNESS   Allen Vela is a 59 y.o. male who presents to a [facility] for an evaluation with concerns of No chief complaint on file.. I have independently interviewed and examined the patient in the office and reviewed available records.    Current History    On today's visit, the patient reports he was previously using advair -- was switched to stiolto related to concern for pneumonia. He states it was after his breathing tests. He has not had pneumonia over the last year. He was hospitalized in  for pneumonia for two weeks.  He states he had pneumonia 2-3 x prior to that and each time he had to be hospitalized. He is using stiolto daily and albuterol HFA (ventolin) - lately several times per day. He has a nebulizer machine, but has been using and it does not seem to be helping. His PCP gave him a medrol dose pack last week - has not seemed to help. He feels he might have bronchitis. He has an occasional cough that can be productive. He states his chest feels irritated/ tight. He has been noticing wheezing. He feels his RUCKER has been worse over the last 2-3 weeks. He lives on the 3rd floor (of his sisters home). He has had issues with doing the steps. Normally he gets a little bit winded vs no issues. He has had SOB at rest. He had a slight sore throat - comes and goes. He has sinus congestion - uses a nasal spray if he absolutely has to. He feels this has been slightly worse since turning the heat on. He feels his GERD is under good control with daily pantoprazole. He has tooth pain - has an appt with  the dentist tomorrow. He states the tooth has been bothering him more over the last month. He was supposed to have it pulled, but hasn't yet. Dentist pulled 3 other teeth recently. He has stabbing pain with eating/ drinking. He has not received any antibiotics from the dentist related to his teeth.  He denies any LE edema.  He got his flu shot 9/11 - he is not sure if that could have been the start of his breathing getting worse.  He did feel run down after receiving it.      CAT today 19     8/30/23 - Dr. Gaxiola - Mr Queen is a 57 yo man with PMHx s/f Asthma, COPD (no PFTs on record), SLE (on Plaquenil), CKD, HTN, DLD who presents to pulmonary clinic to establish care.   Patient reports diagnosed with childhood asthma, only prescribed PRN inhaler up to 4-5 years ago at which time he was diagnosed with COPD at [facility] based on PFTs. At that time he was started on Advair BID, continued on Albuterol PRN. He reports breathing is normally at baseline unless he is admitted with a respiratory infection. Last hospitalization was 03/2023 for pneumonia. He required supplemental oxygen while hospitalized but that was stopped on discharged. He does report significant worsening of his breathing immediately post hospitalization that is now most improved. He currently denies any limitations in terms of his breathing, except with prolonged exertion (vacuuming, etc). He has a seldom cough, non productive mostly. He denies fevers, chills. Prior pneumonia was 2 years prior. Denies ever being admitted to ICU or requiring intubation for asthma/COPD. He denies chest pain, orthopnea, PND. Previously had LE edema which is now resolved. He previously had severe GERD symptoms which are now under well controlled. He was previously staying at an apartment with mold issues, had severe allergic rhinitis symptoms which are now resolved.    Previous pulmonary history: He was previously told he has asthma/ COPD.     Inhalers/nebulized  medications: advair, albuterol     Hospitalization History: He has not been hospitalized over the last year for breathing related problem.    Sleep history: Hx of AMRITA - not able to use CPAP     ALLERGIES AND MEDICATIONS     ALLERGIES  Allergies   Allergen Reactions    Montelukast Shortness of breath     Makes breathing worse    Ace Inhibitors Swelling     Pt reports he developed lip swelling when he was given accupril and lisinopril  in the past       MEDICATIONS  Current Outpatient Medications   Medication Sig Dispense Refill    acetaminophen (Tylenol) 500 mg tablet Take 2 tablets (1,000 mg) by mouth every 6 hours if needed for mild pain (1 - 3). Takes 2-3 tabs      allopurinol (Zyloprim) 100 mg tablet Take 1 tablet (100 mg) by mouth once daily.      amLODIPine (Norvasc) 5 mg tablet Take 1 tablet (5 mg) by mouth once daily.      atenolol (Tenormin) 100 mg tablet Take 1 tablet (100 mg) by mouth once daily.      atorvastatin (Lipitor) 20 mg tablet Take 1 tablet (20 mg) by mouth once daily.      buPROPion XL (Wellbutrin XL) 150 mg 24 hr tablet 1 tablet (150 mg) once daily in the morning.      cholecalciferol (Vitamin D-3) 50 mcg (2,000 unit) capsule Take 1 capsule (50 mcg) by mouth once daily.      DULoxetine (Cymbalta) 60 mg DR capsule Take 1 capsule (60 mg) by mouth once daily.      furosemide (Lasix) 20 mg tablet Take 1 tablet (20 mg) by mouth once daily.      guaiFENesin (Mucinex) 600 mg 12 hr tablet Take 1 tablet (600 mg) by mouth every 12 hours if needed.      hydroxychloroquine (Plaquenil) 200 mg tablet Take 1 tablet (200 mg) by mouth 2 times a day. 90 tablet 1    hydrOXYzine HCL (Atarax) 50 mg tablet Take 1 tablet (50 mg) by mouth once daily as needed.      ipratropium-albuteroL (Duo-Neb) 0.5-2.5 mg/3 mL nebulizer solution Inhale 3 mL every 6 hours if needed.      multivitamin tablet Take 1 tablet by mouth once daily.      pantoprazole (ProtoNix) 40 mg EC tablet Take 1 tablet (40 mg) by mouth 2 times a day.       spironolactone (Aldactone) 25 mg tablet 1 tablet (25 mg) once daily.      tamsulosin (Flomax) 0.4 mg 24 hr capsule Take 1 capsule (0.4 mg) by mouth. AT BEDTIME      tiotropium-olodateroL (Stiolto Respimat) 2.5-2.5 mcg/actuation mist inhaler Inhale 2 Inhalations once daily. 4 g 2    traMADol (Ultram) 50 mg tablet Take 1 tablet (50 mg) by mouth 4 times a day as needed.      Ventolin HFA 90 mcg/actuation inhaler Inhale 2 puffs every 6 hours if needed.      loperamide (Imodium) 2 mg capsule Take 1 capsule (2 mg) by mouth 4 times a day as needed for diarrhea.      topiramate (Topamax) 50 mg tablet Take 1 tablet (50 mg) by mouth 2 times a day. (Patient taking differently: Take 1 tablet (50 mg) by mouth once daily.) 60 tablet 2     No current facility-administered medications for this visit.         PAST HISTORY     PAST MEDICAL HISTORY  - anemia   - anxiety / depression (on ketamine for depression)  - CKD   - asthma/ COPD   - HTN   - HLD   - SLE - on plaquenil   - AMRITA - not on CPAP     PAST SURGICAL HISTORY  Past Surgical History:   Procedure Laterality Date    COLONOSCOPY      LUMBAR EPIDURAL INJECTION  05/14/2024    WISDOM TOOTH EXTRACTION      as a teen       IMMUNIZATION HISTORY  Immunization History   Administered Date(s) Administered    Moderna SARS-CoV-2 Vaccination 03/10/2021    Pfizer COVID-19 vaccine, 12 years and older, (30mcg/0.3mL) (Comirnaty) 11/10/2023    Pfizer COVID-19 vaccine, bivalent, age 12 years and older (30 mcg/0.3 mL) 06/22/2023       SOCIAL HISTORY  Smoking: Former smoker 25 pack years (1.5ppd)- quit in 2010   Alcohol: none   Illicit drugs:  THC/ CBD edibles     OCCUPATIONAL/ENVIRONMENTAL HISTORY  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 KEVIN 2     Chest Radiograph     XR chest 2 views 10/09/2024  Impression  1.  No evidence of acute cardiopulmonary process.        Chest CT Scan     3/7/23 -   Impression   Bilateral infiltrates consistent with pneumonia. Follow-up to ensure  resolution after appropriate treatment recommended.     5/13/23 - Resolution of infiltrates in the chest. No acute finding seen   detected. Enlarged main pulmonary artery compatible pulmonary   arterial hypertension     4/30/24 - Compared to prior examination of 05/12/2023, there has been  interval development of several new nodules throughout lungs  bilaterally, measuring up to 7 mm in the left upper lobe. Overall,  the findings are favored to represent an infectious/inflammatory in  etiology. Follow-up with chest CT in 6 months is recommended to  document resolution.  2. No focal consolidation.  3. Additional findings as above.    11/2/24 - Resolution of previously seen 7 mm left upper lobe nodule and  resolution/improvement of additional minute nodularities. No new  suspicious lesions seen. Additional chronic findings including  atherosclerotic vascular calcifications and colonic diverticulosis.    Echocardiogram     3/2/23- Left ventricular systolic function is normal with a 60-65% estimated ejection fraction.  2. Spectral Doppler shows a pseudonormal pattern of left ventricular diastolic filling.  3. Moderately elevated right ventricular systolic pressure.  RA mildly dilated. RV normal size and function        Other testing/ Labs      Eosinophils Absolute (x10*3/uL)   Date Value   10/09/2024 0.00   08/14/2024 0.20   06/19/2024 0.28     No results found for: \"IGE\"    Respiratory Culture  No results found for: \"RESPCULTSM\", \"GRAMSTAIN\"  No results found for the last 90 days.      Fungal Culture  No results found for: \"FUNGALCULTSM\", \"FUNGALSMEAR\"    AFB Culture  No results found for: \"AFBCX\", \"AFBSTAIN\"      REVIEW OF SYSTEMS     REVIEW OF " SYSTEMS  Review of Systems   Constitutional:  Positive for fatigue. Negative for fever.   HENT:  Negative for congestion, postnasal drip, rhinorrhea, sinus pressure and voice change.    Eyes:  Negative for pain and visual disturbance.   Cardiovascular:  Negative for chest pain, palpitations and leg swelling.   Gastrointestinal:  Negative for abdominal pain, diarrhea, nausea and vomiting.   Endocrine: Negative for cold intolerance and heat intolerance.   Musculoskeletal:  Positive for myalgias. Negative for arthralgias, back pain and joint swelling.   Skin:  Negative for rash.   Neurological:  Negative for dizziness, weakness, numbness and headaches.   Psychiatric/Behavioral:  Positive for sleep disturbance. Negative for agitation. The patient is nervous/anxious.         + depression         PHYSICAL EXAM     VITAL SIGNS: There were no vitals taken for this visit.     CURRENT WEIGHT: [unfilled]  BMI: [unfilled]  PREVIOUS WEIGHTS:  Wt Readings from Last 3 Encounters:   10/09/24 118 kg (260 lb)   08/14/24 117 kg (259 lb)   07/12/24 124 kg (273 lb)       Physical Exam  Vitals reviewed.   Constitutional:       General: He is not in acute distress.     Appearance: Normal appearance. He is not ill-appearing or toxic-appearing.   HENT:      Head: Normocephalic.      Nose: No rhinorrhea.   Cardiovascular:      Rate and Rhythm: Normal rate and regular rhythm.      Heart sounds: Normal heart sounds.   Pulmonary:      Effort: Pulmonary effort is normal. No respiratory distress.      Breath sounds: Normal breath sounds. No stridor. No wheezing, rhonchi or rales.      Comments: Slight expiratory wheeze   Abdominal:      General: Abdomen is flat.   Musculoskeletal:         General: Normal range of motion.      Right lower leg: No edema.      Left lower leg: No edema.   Skin:     General: Skin is warm and dry.      Nails: There is no clubbing.   Neurological:      General: No focal deficit present.      Mental Status: He is alert and  oriented to person, place, and time.   Psychiatric:         Mood and Affect: Mood normal.         Behavior: Behavior normal.         Judgment: Judgment normal.         ASSESSMENT/PLAN     #COPD: Pfts : mild obstruction with BD resp. Recurrent pneumonias - switched from advair to stiolto related to this. May need intermittent ICS depending on symptom improvement with steroids.    -PFTs ordered- done in 11/2023 - need to get a copy   - will get updated PFTs   - continue stiolto daily  - continue albuterol HFA inhaler 2 puffs or albuterol nebulizer treatment every 4-6 hours as needed for shortness of breath     -referral to pulmonary rehab pending PFTs   - will give prednisone course today    -UTD with vaccines     #Lung cancer screening: CT 11/2 with resolution of 7mm nodule   - lung cancer screening in 11/2025     Thank you for visiting the Pulmonary clinic today!   Return to clinic 2-3 months with PFTs or sooner if needed   Yoanna Soliz CNP  My office -  [phone]- Giovanny is my . April is my nurse [phone].   Radiology scheduling [phone]   Appointment scheduling [phone]   Pulmonary function testing - [phone]

## 2024-11-11 NOTE — PATIENT INSTRUCTIONS
#COPD: Pfts : none on record   -PFTs ordered- done in 11/2023 - need to get a copy   - will get updated PFTs   - continue stiolto daily  - continue albuterol HFA inhaler 2 puffs or albuterol nebulizer treatment every 4-6 hours as needed for shortness of breath     -referral to pulmonary rehab pending PFTs   - will give prednisone course today    -UTD with vaccines     #Lung cancer screening: CT 11/2 with resolution of 7mm nodule   - lung cancer screening in 11/2025     Thank you for visiting the Pulmonary clinic today!   Return to clinic 2-3 months with PFTs or sooner if needed   Yoanna Soliz CNP  My office -  [phone]- Giovanny is my . April is my nurse [phone].   Radiology scheduling [phone]   Appointment scheduling [phone]   Pulmonary function testing - [phone]

## 2024-11-15 ENCOUNTER — APPOINTMENT (OUTPATIENT)
Dept: OTOLARYNGOLOGY | Facility: CLINIC | Age: 59
End: 2024-11-15
Payer: COMMERCIAL

## 2024-11-15 ENCOUNTER — APPOINTMENT (OUTPATIENT)
Dept: AUDIOLOGY | Facility: CLINIC | Age: 59
End: 2024-11-15
Payer: COMMERCIAL

## 2024-11-23 DIAGNOSIS — Z79.899 LONG-TERM USE OF PLAQUENIL: ICD-10-CM

## 2024-11-23 DIAGNOSIS — M32.9 SYSTEMIC LUPUS ERYTHEMATOSUS, UNSPECIFIED SLE TYPE, UNSPECIFIED ORGAN INVOLVEMENT STATUS (MULTI): ICD-10-CM

## 2024-11-25 RX ORDER — HYDROXYCHLOROQUINE SULFATE 200 MG/1
200 TABLET, FILM COATED ORAL 2 TIMES DAILY
Qty: 90 TABLET | Refills: 1 | Status: SHIPPED | OUTPATIENT
Start: 2024-11-25 | End: 2025-03-25

## 2024-11-26 ENCOUNTER — DOCUMENTATION (OUTPATIENT)
Dept: PHYSICAL THERAPY | Facility: CLINIC | Age: 59
End: 2024-11-26
Payer: COMMERCIAL

## 2024-11-26 NOTE — PROGRESS NOTES
Physical Therapy    Discharge Summary    Name: Allen Vela  MRN: [id number]  : 1965  Date: 24    Discharge Summary: PT    Discharge Information: Date of discharge     Rehab Discharge Reason: Achieved all and/or the most significant goals(s)

## 2024-12-04 ENCOUNTER — LAB (OUTPATIENT)
Dept: LAB | Facility: LAB | Age: 59
End: 2024-12-04
Payer: COMMERCIAL

## 2024-12-04 ENCOUNTER — APPOINTMENT (OUTPATIENT)
Dept: RHEUMATOLOGY | Facility: CLINIC | Age: 59
End: 2024-12-04
Payer: COMMERCIAL

## 2024-12-04 VITALS
BODY MASS INDEX: 42.59 KG/M2 | SYSTOLIC BLOOD PRESSURE: 167 MMHG | WEIGHT: 281 LBS | HEART RATE: 89 BPM | HEIGHT: 68 IN | DIASTOLIC BLOOD PRESSURE: 88 MMHG

## 2024-12-04 DIAGNOSIS — M32.9 SYSTEMIC LUPUS ERYTHEMATOSUS, UNSPECIFIED SLE TYPE, UNSPECIFIED ORGAN INVOLVEMENT STATUS (MULTI): ICD-10-CM

## 2024-12-04 DIAGNOSIS — M32.9 SYSTEMIC LUPUS ERYTHEMATOSUS, UNSPECIFIED SLE TYPE, UNSPECIFIED ORGAN INVOLVEMENT STATUS (MULTI): Primary | ICD-10-CM

## 2024-12-04 LAB
ALBUMIN SERPL BCP-MCNC: 4 G/DL (ref 3.4–5)
ALP SERPL-CCNC: 57 U/L (ref 33–136)
ALT SERPL W P-5'-P-CCNC: 20 U/L (ref 10–52)
ANION GAP SERPL CALC-SCNC: 14 MMOL/L (ref 10–20)
APPEARANCE UR: CLEAR
AST SERPL W P-5'-P-CCNC: 20 U/L (ref 9–39)
BASOPHILS # BLD AUTO: 0.03 X10*3/UL (ref 0–0.1)
BASOPHILS NFR BLD AUTO: 0.9 %
BILIRUB SERPL-MCNC: 0.7 MG/DL (ref 0–1.2)
BILIRUB UR STRIP.AUTO-MCNC: NEGATIVE MG/DL
BUN SERPL-MCNC: 17 MG/DL (ref 6–23)
C3 SERPL-MCNC: 157 MG/DL (ref 87–200)
C4 SERPL-MCNC: 33 MG/DL (ref 10–50)
CALCIUM SERPL-MCNC: 9.2 MG/DL (ref 8.6–10.6)
CHLORIDE SERPL-SCNC: 105 MMOL/L (ref 98–107)
CO2 SERPL-SCNC: 26 MMOL/L (ref 21–32)
COLOR UR: NORMAL
CREAT SERPL-MCNC: 1.98 MG/DL (ref 0.5–1.3)
CREAT UR-MCNC: 67.9 MG/DL (ref 20–370)
CRP SERPL-MCNC: 0.8 MG/DL
DSDNA AB SER-ACNC: 3 IU/ML
EGFRCR SERPLBLD CKD-EPI 2021: 38 ML/MIN/1.73M*2
EOSINOPHIL # BLD AUTO: 0.11 X10*3/UL (ref 0–0.7)
EOSINOPHIL NFR BLD AUTO: 3.4 %
ERYTHROCYTE [DISTWIDTH] IN BLOOD BY AUTOMATED COUNT: 13.9 % (ref 11.5–14.5)
ERYTHROCYTE [SEDIMENTATION RATE] IN BLOOD BY WESTERGREN METHOD: 22 MM/H (ref 0–20)
GLUCOSE SERPL-MCNC: 86 MG/DL (ref 74–99)
GLUCOSE UR STRIP.AUTO-MCNC: NORMAL MG/DL
HCT VFR BLD AUTO: 35.7 % (ref 41–52)
HGB BLD-MCNC: 11.5 G/DL (ref 13.5–17.5)
IMM GRANULOCYTES # BLD AUTO: 0.01 X10*3/UL (ref 0–0.7)
IMM GRANULOCYTES NFR BLD AUTO: 0.3 % (ref 0–0.9)
KETONES UR STRIP.AUTO-MCNC: NEGATIVE MG/DL
LEUKOCYTE ESTERASE UR QL STRIP.AUTO: NEGATIVE
LYMPHOCYTES # BLD AUTO: 0.92 X10*3/UL (ref 1.2–4.8)
LYMPHOCYTES NFR BLD AUTO: 28.8 %
MCH RBC QN AUTO: 34.1 PG (ref 26–34)
MCHC RBC AUTO-ENTMCNC: 32.2 G/DL (ref 32–36)
MCV RBC AUTO: 106 FL (ref 80–100)
MONOCYTES # BLD AUTO: 0.67 X10*3/UL (ref 0.1–1)
MONOCYTES NFR BLD AUTO: 20.9 %
NEUTROPHILS # BLD AUTO: 1.46 X10*3/UL (ref 1.2–7.7)
NEUTROPHILS NFR BLD AUTO: 45.7 %
NITRITE UR QL STRIP.AUTO: NEGATIVE
NRBC BLD-RTO: 0 /100 WBCS (ref 0–0)
PH UR STRIP.AUTO: 6 [PH]
PLATELET # BLD AUTO: 197 X10*3/UL (ref 150–450)
POTASSIUM SERPL-SCNC: 4.3 MMOL/L (ref 3.5–5.3)
PROT SERPL-MCNC: 6.8 G/DL (ref 6.4–8.2)
PROT UR STRIP.AUTO-MCNC: NEGATIVE MG/DL
PROT UR-ACNC: 4 MG/DL (ref 5–25)
PROT/CREAT UR: 0.06 MG/MG CREAT (ref 0–0.17)
RBC # BLD AUTO: 3.37 X10*6/UL (ref 4.5–5.9)
RBC # UR STRIP.AUTO: NEGATIVE /UL
SODIUM SERPL-SCNC: 141 MMOL/L (ref 136–145)
SP GR UR STRIP.AUTO: 1.01
UROBILINOGEN UR STRIP.AUTO-MCNC: NORMAL MG/DL
WBC # BLD AUTO: 3.2 X10*3/UL (ref 4.4–11.3)

## 2024-12-04 PROCEDURE — 80053 COMPREHEN METABOLIC PANEL: CPT

## 2024-12-04 PROCEDURE — 3079F DIAST BP 80-89 MM HG: CPT | Performed by: INTERNAL MEDICINE

## 2024-12-04 PROCEDURE — 84156 ASSAY OF PROTEIN URINE: CPT

## 2024-12-04 PROCEDURE — 86225 DNA ANTIBODY NATIVE: CPT

## 2024-12-04 PROCEDURE — 81003 URINALYSIS AUTO W/O SCOPE: CPT

## 2024-12-04 PROCEDURE — 1036F TOBACCO NON-USER: CPT | Performed by: INTERNAL MEDICINE

## 2024-12-04 PROCEDURE — 86160 COMPLEMENT ANTIGEN: CPT

## 2024-12-04 PROCEDURE — 82570 ASSAY OF URINE CREATININE: CPT

## 2024-12-04 PROCEDURE — 86140 C-REACTIVE PROTEIN: CPT

## 2024-12-04 PROCEDURE — 3077F SYST BP >= 140 MM HG: CPT | Performed by: INTERNAL MEDICINE

## 2024-12-04 PROCEDURE — 82746 ASSAY OF FOLIC ACID SERUM: CPT

## 2024-12-04 PROCEDURE — 82607 VITAMIN B-12: CPT

## 2024-12-04 PROCEDURE — 36415 COLL VENOUS BLD VENIPUNCTURE: CPT

## 2024-12-04 PROCEDURE — 99214 OFFICE O/P EST MOD 30 MIN: CPT | Performed by: INTERNAL MEDICINE

## 2024-12-04 PROCEDURE — 85652 RBC SED RATE AUTOMATED: CPT

## 2024-12-04 PROCEDURE — 85025 COMPLETE CBC W/AUTO DIFF WBC: CPT

## 2024-12-04 PROCEDURE — 3008F BODY MASS INDEX DOCD: CPT | Performed by: INTERNAL MEDICINE

## 2024-12-04 NOTE — PROGRESS NOTES
Subjective   Patient ID: Allen Vela is a 59 y.o. male who presents for Follow-up.    HPI  58 yo male with lupus and CKD stage III  In 2009, he was diagnosed with SLE in [facility].  No known h/o nephritis  He used HCQ and PRD prn  He had fatigue, joint pain at that time, he does not remember any skin rashes.  Also, the patient reports left shoulder pain and he was diagnosed with left rotator cuff tendinitis.  He also endorses neck pain.  He is using HCQ twice daily, but his physician tapered down its dose 200 mg daily due to no f/u with Ophthalmologist.  His last eye exam was 5 years ago.     04/24:  Today, he is doing well, no active lupus findings.  He denies any active joint disease or skin rashes.     DOMINIQUE 1/1280 pos  RNP 6.7  Sm/RNP >8  Chromatin 6.3  DNA 6    Interval history:  He had shoulder replacement left site in July and no problem until now  He reports hand joint pain and lichen planus related oral ulcer  He denies any photosensitive rash, malar rash, chest pain     12/24:  He is doing well, no active SLE findings now.     Current med:   mg  ROS  Joint pain in hands: negative   Joint swelling: negative  Morning stiffness and duration: negative   strength: normal  Oral ulcer: negative  Genital ulcer: negative  Raynaud phenomenon: negative  Chest pain/dyspnea: negative  Low back pain: negative  Visual problem: negative  Dry eyes/dry mouth: negative  Skin rash/scaling/psoriasis: negative       Objective     PEXAM  VS reviewed, WNL  General: Alert, no distress   HEENT: Normocephalic/atraumatic, No alopecia. PERRLA. Sclera white, conjunctiva pink, no malar rash. no oral or nasal ulcer. Oral cavity pink and moist, no erythema or exudate, dentition good.   Neck: supple  Respiratory: CTA B, no adventitious breath sounds  Cardiac: RRR, no murmurs, carotid, or bruits  Abdominal: symmetrical, soft, non-tender, non-distended, normoactive BSx4 quadrants, no CVA tenderness or suprapubic tenderness  MSK:  Joints of upper and lower extremities were assessed for synovitis and ROM.    Today she has no evidence of synovitis in the joints of her hands or wrists, tender joint count 0, swollen joint count 0   Extremities: no clubbing, no cyanosis, no edema  Skin: Skin warm and moist.   Neuro: non-focal, Strength 5/5 throughout. Normal gait. No cerebellar pathologic exam     Assessment/Plan   60 yo male with lupus and CKD stage III (not related to his SLE)  SLE dx in 2009 based on joint pain and positive autoantibody tests.  No h/o kidney biopsy or other major organ involvement  His DOMINIQUE, chromatin positive, he has h/o arthritis  Currently, no active lupus findings.  Nephrology is following him and thought that his kidney problem is due to his HTN or lupus or analgesic use  PExam showed did not reveal any active joint disease  +OA findings in his right knee  I think his lupus clinically is in remission, and no need additional work-up for his kidney issue. I do not think he has active lupus nephritis     -will see lupus activation markers, urine  -will use  mg daily with annual eye exam  -will see Dermatology for his Lichen planus  -will see him in 4-5 months

## 2024-12-06 ENCOUNTER — APPOINTMENT (OUTPATIENT)
Dept: OTOLARYNGOLOGY | Facility: CLINIC | Age: 59
End: 2024-12-06
Payer: COMMERCIAL

## 2024-12-06 ENCOUNTER — APPOINTMENT (OUTPATIENT)
Dept: AUDIOLOGY | Facility: CLINIC | Age: 59
End: 2024-12-06
Payer: COMMERCIAL

## 2024-12-09 DIAGNOSIS — D64.9 ANEMIA, UNSPECIFIED TYPE: Primary | ICD-10-CM

## 2024-12-09 LAB
FOLATE SERPL-MCNC: >24 NG/ML
VIT B12 SERPL-MCNC: 825 PG/ML (ref 211–911)

## 2024-12-13 ENCOUNTER — APPOINTMENT (OUTPATIENT)
Dept: NEPHROLOGY | Facility: CLINIC | Age: 59
End: 2024-12-13
Payer: COMMERCIAL

## 2024-12-13 VITALS
WEIGHT: 275 LBS | DIASTOLIC BLOOD PRESSURE: 83 MMHG | OXYGEN SATURATION: 91 % | TEMPERATURE: 97.5 F | HEART RATE: 79 BPM | SYSTOLIC BLOOD PRESSURE: 129 MMHG | BODY MASS INDEX: 41.81 KG/M2

## 2024-12-13 DIAGNOSIS — I10 ESSENTIAL HYPERTENSION: ICD-10-CM

## 2024-12-13 DIAGNOSIS — Z79.1 NSAID LONG-TERM USE: ICD-10-CM

## 2024-12-13 DIAGNOSIS — N18.30 STAGE 3 CHRONIC KIDNEY DISEASE, UNSPECIFIED WHETHER STAGE 3A OR 3B CKD (MULTI): Primary | ICD-10-CM

## 2024-12-13 DIAGNOSIS — M32.10 SYSTEMIC LUPUS ERYTHEMATOSUS, ORGAN OR SYSTEM INVOLVEMENT UNSPECIFIED (MULTI): ICD-10-CM

## 2024-12-13 PROCEDURE — 99212 OFFICE O/P EST SF 10 MIN: CPT | Performed by: NURSE PRACTITIONER

## 2024-12-13 PROCEDURE — 3074F SYST BP LT 130 MM HG: CPT | Performed by: NURSE PRACTITIONER

## 2024-12-13 PROCEDURE — 3079F DIAST BP 80-89 MM HG: CPT | Performed by: NURSE PRACTITIONER

## 2024-12-13 ASSESSMENT — ENCOUNTER SYMPTOMS
ARTHRALGIAS: 1
EYES NEGATIVE: 1
NEUROLOGICAL NEGATIVE: 1
GASTROINTESTINAL NEGATIVE: 1
BACK PAIN: 1
ENDOCRINE NEGATIVE: 1
CONSTITUTIONAL NEGATIVE: 1
RESPIRATORY NEGATIVE: 1
CARDIOVASCULAR NEGATIVE: 1
PSYCHIATRIC NEGATIVE: 1
HEMATOLOGIC/LYMPHATIC NEGATIVE: 1

## 2024-12-13 NOTE — PROGRESS NOTES
History Of Present Illness  Allen Vela is a 59 y.o. male with medical history significant for CKD3b/4, HTN, HLD, asthma, COPD, cervical spondylosis, gout, AMRITA, excessive NSAIDs use, and systemic lupus erythematous(SLE) who presents for a 5-month fuv for CKD.     Past Medical History  As above.    Surgical History  He has a past surgical history that includes [place] tooth extraction; Colonoscopy; and Lumbar epidural injection (05/14/2024).     Social History  He reports that he quit smoking about 14 years ago. His smoking use included cigarettes. He started smoking about 34 years ago. He has a 10 pack-year smoking history. He has never used smokeless tobacco. He reports current alcohol use. He reports that he does not use drugs.    Family History  Family History   Problem Relation Name Age of Onset    COPD Mother      Heart failure Mother      Heart failure Father      No Known Problems Sister      Lupus Sister          skin    No Known Problems Sister      No Known Problems Sister      No Known Problems Brother      Diabetes Maternal Grandmother          Allergies  Montelukast and Ace inhibitors    Review of Systems   Constitutional: Negative.    HENT: Negative.     Eyes: Negative.    Respiratory: Negative.     Cardiovascular: Negative.    Gastrointestinal: Negative.    Endocrine: Negative.    Genitourinary: Negative.    Musculoskeletal:  Positive for arthralgias and back pain.   Skin: Negative.    Neurological: Negative.    Hematological: Negative.    Psychiatric/Behavioral: Negative.          Physical Exam  Constitutional:       Appearance: Normal appearance.   HENT:      Head: Normocephalic and atraumatic.      Mouth/Throat:      Mouth: Mucous membranes are moist.   Cardiovascular:      Rate and Rhythm: Normal rate and regular rhythm.      Pulses: Normal pulses.      Heart sounds: Normal heart sounds.   Pulmonary:      Effort: Pulmonary effort is normal.      Breath sounds: Normal breath sounds.    Musculoskeletal:      Comments: Chronic joint pain   Skin:     General: Skin is warm and dry.   Neurological:      General: No focal deficit present.      Mental Status: He is alert and oriented to person, place, and time.   Psychiatric:         Mood and Affect: Mood normal.         Behavior: Behavior normal.         Thought Content: Thought content normal.         Judgment: Judgment normal.          Last Recorded Vitals  Blood pressure 129/83, pulse 79, temperature 36.4 °C (97.5 °F), temperature source Temporal, weight 125 kg (275 lb), SpO2 91%.    Relevant Results       Recent Results (from the past 8 weeks)   Sedimentation Rate    Collection Time: 12/04/24  3:17 PM   Result Value Ref Range    Sedimentation Rate 22 (H) 0 - 20 mm/h   C-Reactive Protein    Collection Time: 12/04/24  3:17 PM   Result Value Ref Range    C-Reactive Protein 0.80 <1.00 mg/dL   CBC and Auto Differential    Collection Time: 12/04/24  3:17 PM   Result Value Ref Range    WBC 3.2 (L) 4.4 - 11.3 x10*3/uL    nRBC 0.0 0.0 - 0.0 /100 WBCs    RBC 3.37 (L) 4.50 - 5.90 x10*6/uL    Hemoglobin 11.5 (L) 13.5 - 17.5 g/dL    Hematocrit 35.7 (L) 41.0 - 52.0 %     (H) 80 - 100 fL    MCH 34.1 (H) 26.0 - 34.0 pg    MCHC 32.2 32.0 - 36.0 g/dL    RDW 13.9 11.5 - 14.5 %    Platelets 197 150 - 450 x10*3/uL    Neutrophils % 45.7 40.0 - 80.0 %    Immature Granulocytes %, Automated 0.3 0.0 - 0.9 %    Lymphocytes % 28.8 13.0 - 44.0 %    Monocytes % 20.9 2.0 - 10.0 %    Eosinophils % 3.4 0.0 - 6.0 %    Basophils % 0.9 0.0 - 2.0 %    Neutrophils Absolute 1.46 1.20 - 7.70 x10*3/uL    Immature Granulocytes Absolute, Automated 0.01 0.00 - 0.70 x10*3/uL    Lymphocytes Absolute 0.92 (L) 1.20 - 4.80 x10*3/uL    Monocytes Absolute 0.67 0.10 - 1.00 x10*3/uL    Eosinophils Absolute 0.11 0.00 - 0.70 x10*3/uL    Basophils Absolute 0.03 0.00 - 0.10 x10*3/uL   Comprehensive Metabolic Panel    Collection Time: 12/04/24  3:17 PM   Result Value Ref Range    Glucose 86 74 -  99 mg/dL    Sodium 141 136 - 145 mmol/L    Potassium 4.3 3.5 - 5.3 mmol/L    Chloride 105 98 - 107 mmol/L    Bicarbonate 26 21 - 32 mmol/L    Anion Gap 14 10 - 20 mmol/L    Urea Nitrogen 17 6 - 23 mg/dL    Creatinine 1.98 (H) 0.50 - 1.30 mg/dL    eGFR 38 (L) >60 mL/min/1.73m*2    Calcium 9.2 8.6 - 10.6 mg/dL    Albumin 4.0 3.4 - 5.0 g/dL    Alkaline Phosphatase 57 33 - 136 U/L    Total Protein 6.8 6.4 - 8.2 g/dL    AST 20 9 - 39 U/L    Bilirubin, Total 0.7 0.0 - 1.2 mg/dL    ALT 20 10 - 52 U/L   C3 Complement    Collection Time: 12/04/24  3:17 PM   Result Value Ref Range    C3 Complement 157 87 - 200 mg/dL   C4 Complement    Collection Time: 12/04/24  3:17 PM   Result Value Ref Range    C4 Complement 33 10 - 50 mg/dL   Anti-DNA Antibody, Double-Stranded    Collection Time: 12/04/24  3:17 PM   Result Value Ref Range    Anti-DNA (DS) 3.0 <5.0 IU/mL   Folate    Collection Time: 12/04/24  3:17 PM   Result Value Ref Range    Folate, Serum >24.0 >5.0 ng/mL   Vitamin B12    Collection Time: 12/04/24  3:17 PM   Result Value Ref Range    Vitamin B12 825 211 - 911 pg/mL   Protein, Urine Random    Collection Time: 12/04/24  3:31 PM   Result Value Ref Range    Total Protein, Urine Random 4 (L) 5 - 25 mg/dL    Creatinine, Urine Random 67.9 20.0 - 370.0 mg/dL    T. Protein/Creatinine Ratio 0.06 0.00 - 0.17 mg/mg Creat   Urinalysis with Reflex Microscopic    Collection Time: 12/04/24  3:31 PM   Result Value Ref Range    Color, Urine Light-Yellow Light-Yellow, Yellow, Dark-Yellow    Appearance, Urine Clear Clear    Specific Gravity, Urine 1.011 1.005 - 1.035    pH, Urine 6.0 5.0, 5.5, 6.0, 6.5, 7.0, 7.5, 8.0    Protein, Urine NEGATIVE NEGATIVE, 10 (TRACE), 20 (TRACE) mg/dL    Glucose, Urine Normal Normal mg/dL    Blood, Urine NEGATIVE NEGATIVE    Ketones, Urine NEGATIVE NEGATIVE mg/dL    Bilirubin, Urine NEGATIVE NEGATIVE    Urobilinogen, Urine Normal Normal mg/dL    Nitrite, Urine NEGATIVE NEGATIVE    Leukocyte Esterase, Urine  NEGATIVE NEGATIVE         Assessment/Plan     59 y.o. male with medical history significant for CKD3b/4, HTN, HLD, asthma, COPD, cervical spondylosis, gout, AMRITA, excessive NSAIDs use, and systemic lupus erythematous(SLE) who presents for a 5-month fuv for CKD.     # CKD3b/4: baseline sCr 1.8 - 2.8 mg/dL with eGFR 25 - 44 ml/min/1.73m2. 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 Most recent sCr 1.98 mg/dL with eGFR 38 ml/min/1.73m2 (24) - at baseline. Currently stable.      # HTN: BP well controlled with current regimen.     # Lupus: dx in . f/u with rheumatology.     # Hx of NSAIDs use: long-term use for chronic pain. Has stopped since last visit.     Plan:   - Continue to follow up with PCP for optimal HTN management.  - Continue to follow up with rheumatology for optimal lupus management.   - No changes with current medications.   - Reviewed strategies for preserving remaining kidney function includin) Avoidance of NSAIDs including Aleve (Naprosyn), Motrin (Ibuprofen), Mobic (Meloxicam), Celebrex (Celecoxib) and aspirin as well as PPI acid blocking medications such as Prilosec (omeprazole), Protonix (pantoprazole), and Nexium (esomeprazole), as well as other nephrotoxic agents.   2) Avoidance of tobacco or alcohol use.   3) Adequate hydration daily.   4) Blood pressure target 130/80 mmHg.   5) Daily dietary sodium intake less than 2 grams per day.    6) Maintain healthy lifestyle. Healthy diet and regular exercises.   7) Maintain ideal weight.   - FUV: in 6 months or sooner if concerns arise.     Patient verbalized understanding of above. He will not hesitate to contact Division of Nephrology at [phone] with concerns/questions.    I spent 15 minutes in the professional and overall care of this patient.      Izabela Lundberg, KERVIN-CNP

## 2024-12-19 ENCOUNTER — HOSPITAL ENCOUNTER (OUTPATIENT)
Dept: RESPIRATORY THERAPY | Facility: HOSPITAL | Age: 59
End: 2024-12-19
Payer: COMMERCIAL

## 2024-12-23 ENCOUNTER — APPOINTMENT (OUTPATIENT)
Dept: PULMONOLOGY | Facility: HOSPITAL | Age: 59
End: 2024-12-23
Payer: COMMERCIAL

## 2025-01-06 ENCOUNTER — APPOINTMENT (OUTPATIENT)
Dept: PULMONOLOGY | Facility: HOSPITAL | Age: 60
End: 2025-01-06
Payer: COMMERCIAL

## 2025-01-06 VITALS
HEART RATE: 76 BPM | TEMPERATURE: 97 F | WEIGHT: 271 LBS | SYSTOLIC BLOOD PRESSURE: 133 MMHG | DIASTOLIC BLOOD PRESSURE: 89 MMHG | OXYGEN SATURATION: 95 % | BODY MASS INDEX: 41.21 KG/M2

## 2025-01-06 DIAGNOSIS — R91.1 PULMONARY NODULE: ICD-10-CM

## 2025-01-06 DIAGNOSIS — J42 CHRONIC BRONCHITIS, UNSPECIFIED CHRONIC BRONCHITIS TYPE (MULTI): Primary | ICD-10-CM

## 2025-01-06 PROCEDURE — 99213 OFFICE O/P EST LOW 20 MIN: CPT | Performed by: NURSE PRACTITIONER

## 2025-01-06 PROCEDURE — 3075F SYST BP GE 130 - 139MM HG: CPT | Performed by: NURSE PRACTITIONER

## 2025-01-06 PROCEDURE — 1036F TOBACCO NON-USER: CPT | Performed by: NURSE PRACTITIONER

## 2025-01-06 PROCEDURE — 3079F DIAST BP 80-89 MM HG: CPT | Performed by: NURSE PRACTITIONER

## 2025-01-06 ASSESSMENT — ENCOUNTER SYMPTOMS
NAUSEA: 0
DIARRHEA: 0
VOICE CHANGE: 0
NERVOUS/ANXIOUS: 1
HEADACHES: 0
ABDOMINAL PAIN: 0
DIZZINESS: 0
ARTHRALGIAS: 0
SLEEP DISTURBANCE: 1
FATIGUE: 0
FEVER: 0
WEAKNESS: 0
PALPITATIONS: 0
RHINORRHEA: 0
SINUS PRESSURE: 0
MYALGIAS: 0
BACK PAIN: 0
VOMITING: 0
AGITATION: 0
JOINT SWELLING: 0
EYE PAIN: 0
NUMBNESS: 0

## 2025-01-06 ASSESSMENT — PAIN SCALES - GENERAL: PAINLEVEL_OUTOF10: 0-NO PAIN

## 2025-01-06 NOTE — PROGRESS NOTES
Patient: Allen Vela    [id number]  : 1965 -- AGE 59 y.o.    Provider: KERVIN Emanuel-CNP     Location [facility]   Service Date: 2025              [facility]  Follow up Visit Note      HISTORY OF PRESENT ILLNESS     The patient's referring provider is: Danny Balderrama DO    HISTORY OF PRESENT ILLNESS   Allen Vela is a 59 y.o. male who presents to a [facility] for an evaluation with concerns of Follow-up. I have independently interviewed and examined the patient in the office and reviewed available records.    Current History    Since last visit he has been doing well. He received a prednisone course last visit. He felt at that time he was having fluid retention - had LE edema at that time. He has been using his stiolto daily, albuterol HFA 2x a day, and albuterol nebulizers not since our last visit. PFTs scheduled last this month.  He denies any cough, wheezing, SOB at rest, or CP. RUCKER has improved back to his baseline - has RUCKER with going up stairs. He lives on the 3rd floor. He has intermittent nasal congestion - uses an OTC nasal spray PRN. His GERD is under good control with daily pantoprazole.     CAT Today 18     24: On today's visit, the patient reports he was previously using advair -- was switched to stiolto related to concern for pneumonia. He states it was after his breathing tests. He has not had pneumonia over the last year. He was hospitalized in  for pneumonia for two weeks.  He states he had pneumonia 2-3 x prior to that and each time he had to be hospitalized. He is using stiolto daily and albuterol HFA (ventolin) - lately several times per day. He has a nebulizer machine, but has been using and it does not seem to be helping. His PCP gave him a medrol dose pack last week - has not seemed to help. He feels he might have bronchitis. He has an occasional cough that can  be productive. He states his chest feels irritated/ tight. He has been noticing wheezing. He feels his RUCKER has been worse over the last 2-3 weeks. He lives on the 3rd floor (of his sisters home). He has had issues with doing the steps. Normally he gets a little bit winded vs no issues. He has had SOB at rest. He had a slight sore throat - comes and goes. He has sinus congestion - uses a nasal spray if he absolutely has to. He feels this has been slightly worse since turning the heat on. He feels his GERD is under good control with daily pantoprazole. He has tooth pain - has an appt with the dentist tomorrow. He states the tooth has been bothering him more over the last month. He was supposed to have it pulled, but hasn't yet. Dentist pulled 3 other teeth recently. He has stabbing pain with eating/ drinking. He has not received any antibiotics from the dentist related to his teeth.  He denies any LE edema.  He got his flu shot 9/11 - he is not sure if that could have been the start of his breathing getting worse.  He did feel run down after receiving it.      CAT today 19     8/30/23 - Dr. Gaxiola - Mr Queen is a 57 yo man with PMHx s/f Asthma, COPD (no PFTs on record), SLE (on Plaquenil), CKD, HTN, DLD who presents to pulmonary clinic to establish care.   Patient reports diagnosed with childhood asthma, only prescribed PRN inhaler up to 4-5 years ago at which time he was diagnosed with COPD at [facility] based on PFTs. At that time he was started on Advair BID, continued on Albuterol PRN. He reports breathing is normally at baseline unless he is admitted with a respiratory infection. Last hospitalization was 03/2023 for pneumonia. He required supplemental oxygen while hospitalized but that was stopped on discharged. He does report significant worsening of his breathing immediately post hospitalization that is now most improved. He currently denies any limitations in terms of his breathing, except with prolonged  exertion (vacuuming, etc). He has a seldom cough, non productive mostly. He denies fevers, chills. Prior pneumonia was 2 years prior. Denies ever being admitted to ICU or requiring intubation for asthma/COPD. He denies chest pain, orthopnea, PND. Previously had LE edema which is now resolved. He previously had severe GERD symptoms which are now under well controlled. He was previously staying at an apartment with mold issues, had severe allergic rhinitis symptoms which are now resolved.    Previous pulmonary history: He was previously told he has asthma/ COPD.     Inhalers/nebulized medications: advair, albuterol     Hospitalization History: He has not been hospitalized over the last year for breathing related problem.    Sleep history: Hx of AMRITA - not able to use CPAP     ALLERGIES AND MEDICATIONS     ALLERGIES  Allergies   Allergen Reactions    Montelukast Shortness of breath     Makes breathing worse    Ace Inhibitors Swelling     Pt reports he developed lip swelling when he was given accupril and lisinopril  in the past       MEDICATIONS  Current Outpatient Medications   Medication Sig Dispense Refill    acetaminophen (Tylenol) 500 mg tablet Take 2 tablets (1,000 mg) by mouth every 6 hours if needed for mild pain (1 - 3). Takes 2-3 tabs      allopurinol (Zyloprim) 100 mg tablet Take 1 tablet (100 mg) by mouth once daily.      amLODIPine (Norvasc) 5 mg tablet Take 1 tablet (5 mg) by mouth once daily.      atenolol (Tenormin) 100 mg tablet Take 1 tablet (100 mg) by mouth once daily.      atorvastatin (Lipitor) 20 mg tablet Take 1 tablet (20 mg) by mouth once daily.      buPROPion XL (Wellbutrin XL) 150 mg 24 hr tablet 1 tablet (150 mg) once daily in the morning.      cholecalciferol (Vitamin D-3) 50 mcg (2,000 unit) capsule Take 1 capsule (50 mcg) by mouth once daily.      DULoxetine (Cymbalta) 60 mg DR capsule Take 1 capsule (60 mg) by mouth once daily.      furosemide (Lasix) 20 mg tablet Take 1 tablet (20 mg)  by mouth once daily.      guaiFENesin (Mucinex) 600 mg 12 hr tablet Take 1 tablet (600 mg) by mouth every 12 hours if needed.      hydroxychloroquine (Plaquenil) 200 mg tablet Take 1 tablet (200 mg) by mouth 2 times a day. 90 tablet 1    hydrOXYzine HCL (Atarax) 50 mg tablet Take 1 tablet (50 mg) by mouth once daily as needed.      ipratropium-albuteroL (Duo-Neb) 0.5-2.5 mg/3 mL nebulizer solution Inhale 3 mL every 6 hours if needed.      multivitamin tablet Take 1 tablet by mouth once daily.      pantoprazole (ProtoNix) 40 mg EC tablet Take 1 tablet (40 mg) by mouth 2 times a day.      spironolactone (Aldactone) 25 mg tablet 1 tablet (25 mg) once daily.      tamsulosin (Flomax) 0.4 mg 24 hr capsule Take 1 capsule (0.4 mg) by mouth. AT BEDTIME      tiotropium-olodateroL (Stiolto Respimat) 2.5-2.5 mcg/actuation mist inhaler Inhale 2 Inhalations once daily. 4 g 2    traMADol (Ultram) 50 mg tablet Take 1 tablet (50 mg) by mouth 4 times a day as needed.      Ventolin HFA 90 mcg/actuation inhaler Inhale 2 puffs every 6 hours if needed.       No current facility-administered medications for this visit.         PAST HISTORY     PAST MEDICAL HISTORY  - anemia   - anxiety / depression (on ketamine for depression)  - CKD   - asthma/ COPD   - HTN   - HLD   - SLE - on plaquenil   - AMRITA - not on CPAP     PAST SURGICAL HISTORY  Past Surgical History:   Procedure Laterality Date    COLONOSCOPY      LUMBAR EPIDURAL INJECTION  05/14/2024    WISDOM TOOTH EXTRACTION      as a teen       IMMUNIZATION HISTORY  Immunization History   Administered Date(s) Administered    Moderna SARS-CoV-2 Vaccination 03/10/2021    Pfizer COVID-19 vaccine, 12 years and older, (30mcg/0.3mL) (Comirnaty) 11/10/2023    Pfizer COVID-19 vaccine, bivalent, age 12 years and older (30 mcg/0.3 mL) 06/22/2023       SOCIAL HISTORY  Smoking: Former smoker 25 pack years (1.5ppd)- quit in 2010   Alcohol: none   Illicit drugs:  THC/ CBD edibles      OCCUPATIONAL/ENVIRONMENTAL HISTORY  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 KEVIN 2     Chest Radiograph     XR chest 2 views 10/09/2024  Impression  1.  No evidence of acute cardiopulmonary process.        Chest CT Scan     3/7/23 -   Impression   Bilateral infiltrates consistent with pneumonia. Follow-up to ensure  resolution after appropriate treatment recommended.     5/13/23 - Resolution of infiltrates in the chest. No acute finding seen   detected. Enlarged main pulmonary artery compatible pulmonary   arterial hypertension     4/30/24 - Compared to prior examination of 05/12/2023, there has been  interval development of several new nodules throughout lungs  bilaterally, measuring up to 7 mm in the left upper lobe. Overall,  the findings are favored to represent an infectious/inflammatory in  etiology. Follow-up with chest CT in 6 months is recommended to  document resolution.  2. No focal consolidation.  3. Additional findings as above.    11/2/24 - Resolution of previously seen 7 mm left upper lobe nodule and  resolution/improvement of additional minute nodularities. No new  suspicious lesions seen. Additional chronic findings including  atherosclerotic vascular calcifications and colonic diverticulosis.    Echocardiogram     3/2/23- Left ventricular systolic function is normal with a 60-65% estimated ejection fraction.  2. Spectral Doppler shows a pseudonormal pattern of left ventricular diastolic filling.  3. Moderately elevated right ventricular systolic pressure.  RA mildly dilated. RV normal size and function        Other testing/ Labs      Eosinophils Absolute (x10*3/uL)   Date Value   12/04/2024 0.11   10/09/2024 0.00  "  08/14/2024 0.20     No results found for: \"IGE\"    Respiratory Culture  No results found for: \"RESPCULTSM\", \"GRAMSTAIN\"  No results found for the last 90 days.      Fungal Culture  No results found for: \"FUNGALCULTSM\", \"FUNGALSMEAR\"    AFB Culture  No results found for: \"AFBCX\", \"AFBSTAIN\"      REVIEW OF SYSTEMS     REVIEW OF SYSTEMS  Review of Systems   Constitutional:  Negative for fatigue and fever.   HENT:  Positive for congestion. Negative for postnasal drip, rhinorrhea, sinus pressure and voice change.    Eyes:  Negative for pain and visual disturbance.   Cardiovascular:  Negative for chest pain, palpitations and leg swelling.   Gastrointestinal:  Negative for abdominal pain, diarrhea, nausea and vomiting.   Endocrine: Negative for cold intolerance and heat intolerance.   Musculoskeletal:  Negative for arthralgias, back pain, joint swelling and myalgias.   Skin:  Negative for rash.   Neurological:  Negative for dizziness, weakness, numbness and headaches.   Psychiatric/Behavioral:  Positive for sleep disturbance. Negative for agitation. The patient is nervous/anxious.         + depression          PHYSICAL EXAM     VITAL SIGNS: There were no vitals taken for this visit.     CURRENT WEIGHT: [unfilled]  BMI: [unfilled]  PREVIOUS WEIGHTS:  Wt Readings from Last 3 Encounters:   12/13/24 125 kg (275 lb)   12/04/24 127 kg (281 lb)   11/11/24 125 kg (276 lb)       Physical Exam  Vitals reviewed.   Constitutional:       General: He is not in acute distress.     Appearance: Normal appearance. He is not ill-appearing or toxic-appearing.   HENT:      Head: Normocephalic.      Nose: No rhinorrhea.   Cardiovascular:      Rate and Rhythm: Normal rate and regular rhythm.      Heart sounds: Normal heart sounds.   Pulmonary:      Effort: Pulmonary effort is normal. No respiratory distress.      Breath sounds: Normal breath sounds. No stridor. No wheezing, rhonchi or rales.   Abdominal:      General: Abdomen is flat. "   Musculoskeletal:         General: Normal range of motion.      Right lower leg: No edema.      Left lower leg: No edema.   Skin:     General: Skin is warm and dry.      Nails: There is no clubbing.   Neurological:      General: No focal deficit present.      Mental Status: He is alert and oriented to person, place, and time.   Psychiatric:         Mood and Affect: Mood normal.         Behavior: Behavior normal.         Judgment: Judgment normal.         ASSESSMENT/PLAN     #COPD: Pfts : mild obstruction with BD resp. Recurrent pneumonias - switched from advair to stiolto related to this. May need intermittent ICS depending on symptom improvement with steroids.    - PFTs ordered- scheduled upcoming    - continue stiolto daily  - continue albuterol HFA inhaler 2 puffs or albuterol nebulizer treatment every 4-6 hours as needed for shortness of breath     -referral to pulmonary rehab pending PFTs   -UTD with vaccines     #Lung cancer screening: CT 11/2 with resolution of 7mm nodule   - lung cancer screening in 11/2025     # Allergic rhinitis:   - continue nasal saline spray each nostril 1-2 x per day - remember to aim towards your ear -- and whichever OTC nasal spray you have been using     Thank you for visiting the Pulmonary clinic today!   Return to clinic 6 months or sooner if needed   Yoanna Soliz CNP  My office -  [phone]- Giovanny is my . April is my nurse [phone].   Radiology scheduling [phone]   Appointment scheduling [phone]   Pulmonary function testing - [phone]

## 2025-01-06 NOTE — PATIENT INSTRUCTIONS
#COPD: Pfts : mild obstruction with BD resp. Recurrent pneumonias - switched from advair to stiolto related to this. May need intermittent ICS depending on symptom improvement with steroids.    - PFTs ordered- scheduled upcoming    - continue stiolto daily  - continue albuterol HFA inhaler 2 puffs or albuterol nebulizer treatment every 4-6 hours as needed for shortness of breath     -referral to pulmonary rehab pending PFTs   -UTD with vaccines     #Lung cancer screening: CT 11/2 with resolution of 7mm nodule   - lung cancer screening in 11/2025     # Allergic rhinitis:   - continue nasal saline spray each nostril 1-2 x per day - remember to aim towards your ear -- and whichever OTC nasal spray you have been using     Thank you for visiting the Pulmonary clinic today!   Return to clinic 6 months or sooner if needed   Yoanna Soliz CNP  My office -  [phone]- Giovanny is my . April is my nurse [phone].   Radiology scheduling [phone]   Appointment scheduling [phone]   Pulmonary function testing - [phone]

## 2025-01-15 ENCOUNTER — LAB (OUTPATIENT)
Dept: LAB | Facility: LAB | Age: 60
End: 2025-01-15
Payer: COMMERCIAL

## 2025-01-15 DIAGNOSIS — M32.9 SYSTEMIC LUPUS ERYTHEMATOSUS, UNSPECIFIED SLE TYPE, UNSPECIFIED ORGAN INVOLVEMENT STATUS (MULTI): ICD-10-CM

## 2025-01-15 LAB
ERYTHROCYTE [DISTWIDTH] IN BLOOD BY AUTOMATED COUNT: 14 % (ref 11.5–14.5)
ERYTHROCYTE [SEDIMENTATION RATE] IN BLOOD BY WESTERGREN METHOD: 35 MM/H (ref 0–20)
HCT VFR BLD AUTO: 39.1 % (ref 41–52)
HGB BLD-MCNC: 12.5 G/DL (ref 13.5–17.5)
MCH RBC QN AUTO: 34.1 PG (ref 26–34)
MCHC RBC AUTO-ENTMCNC: 32 G/DL (ref 32–36)
MCV RBC AUTO: 107 FL (ref 80–100)
NRBC BLD-RTO: 0 /100 WBCS (ref 0–0)
PLATELET # BLD AUTO: 288 X10*3/UL (ref 150–450)
RBC # BLD AUTO: 3.67 X10*6/UL (ref 4.5–5.9)
WBC # BLD AUTO: 4.1 X10*3/UL (ref 4.4–11.3)

## 2025-01-15 PROCEDURE — 86160 COMPLEMENT ANTIGEN: CPT

## 2025-01-15 PROCEDURE — 86140 C-REACTIVE PROTEIN: CPT

## 2025-01-15 PROCEDURE — 86225 DNA ANTIBODY NATIVE: CPT

## 2025-01-15 PROCEDURE — 80053 COMPREHEN METABOLIC PANEL: CPT

## 2025-01-15 PROCEDURE — 85652 RBC SED RATE AUTOMATED: CPT

## 2025-01-15 PROCEDURE — 85027 COMPLETE CBC AUTOMATED: CPT

## 2025-01-16 LAB
ALBUMIN SERPL BCP-MCNC: 4.2 G/DL (ref 3.4–5)
ALP SERPL-CCNC: 53 U/L (ref 33–136)
ALT SERPL W P-5'-P-CCNC: 15 U/L (ref 10–52)
ANION GAP SERPL CALC-SCNC: 18 MMOL/L (ref 10–20)
AST SERPL W P-5'-P-CCNC: 16 U/L (ref 9–39)
BILIRUB SERPL-MCNC: 0.7 MG/DL (ref 0–1.2)
BUN SERPL-MCNC: 19 MG/DL (ref 6–23)
C3 SERPL-MCNC: 154 MG/DL (ref 87–200)
C4 SERPL-MCNC: 33 MG/DL (ref 10–50)
CALCIUM SERPL-MCNC: 9.7 MG/DL (ref 8.6–10.6)
CHLORIDE SERPL-SCNC: 105 MMOL/L (ref 98–107)
CO2 SERPL-SCNC: 24 MMOL/L (ref 21–32)
CREAT SERPL-MCNC: 1.99 MG/DL (ref 0.5–1.3)
CRP SERPL-MCNC: 1.09 MG/DL
DSDNA AB SER-ACNC: 2 IU/ML
EGFRCR SERPLBLD CKD-EPI 2021: 37 ML/MIN/1.73M*2
GLUCOSE SERPL-MCNC: 99 MG/DL (ref 74–99)
POTASSIUM SERPL-SCNC: 4.7 MMOL/L (ref 3.5–5.3)
PROT SERPL-MCNC: 6.7 G/DL (ref 6.4–8.2)
SODIUM SERPL-SCNC: 142 MMOL/L (ref 136–145)

## 2025-01-21 ENCOUNTER — HOSPITAL ENCOUNTER (OUTPATIENT)
Dept: RESPIRATORY THERAPY | Facility: HOSPITAL | Age: 60
End: 2025-01-21
Payer: COMMERCIAL

## 2025-01-27 ENCOUNTER — APPOINTMENT (OUTPATIENT)
Dept: RESPIRATORY THERAPY | Facility: HOSPITAL | Age: 60
End: 2025-01-27
Payer: COMMERCIAL

## 2025-01-28 ENCOUNTER — HOSPITAL ENCOUNTER (OUTPATIENT)
Dept: RESPIRATORY THERAPY | Facility: HOSPITAL | Age: 60
Discharge: HOME | End: 2025-01-28
Payer: COMMERCIAL

## 2025-01-28 DIAGNOSIS — J42 CHRONIC BRONCHITIS, UNSPECIFIED CHRONIC BRONCHITIS TYPE (MULTI): ICD-10-CM

## 2025-01-28 PROCEDURE — 94618 PULMONARY STRESS TESTING: CPT

## 2025-01-28 PROCEDURE — 94726 PLETHYSMOGRAPHY LUNG VOLUMES: CPT | Performed by: INTERNAL MEDICINE

## 2025-01-28 PROCEDURE — 94060 EVALUATION OF WHEEZING: CPT | Performed by: INTERNAL MEDICINE

## 2025-01-28 PROCEDURE — 94618 PULMONARY STRESS TESTING: CPT | Performed by: INTERNAL MEDICINE

## 2025-01-28 PROCEDURE — 94729 DIFFUSING CAPACITY: CPT | Performed by: INTERNAL MEDICINE

## 2025-01-29 ENCOUNTER — APPOINTMENT (OUTPATIENT)
Dept: ORTHOPEDIC SURGERY | Facility: CLINIC | Age: 60
End: 2025-01-29
Payer: COMMERCIAL

## 2025-01-29 LAB
MGC ASCENT PFT - FEV1 - POST: 1.65
MGC ASCENT PFT - FEV1 - PRE: 1.28
MGC ASCENT PFT - FEV1 - PREDICTED: 3
MGC ASCENT PFT - FVC - POST: 3.11
MGC ASCENT PFT - FVC - PRE: 2.94
MGC ASCENT PFT - FVC - PREDICTED: 3.82

## 2025-02-06 ENCOUNTER — APPOINTMENT (OUTPATIENT)
Dept: DERMATOLOGY | Facility: CLINIC | Age: 60
End: 2025-02-06
Payer: COMMERCIAL

## 2025-02-26 ENCOUNTER — OFFICE VISIT (OUTPATIENT)
Dept: ORTHOPEDIC SURGERY | Facility: CLINIC | Age: 60
End: 2025-02-26
Payer: COMMERCIAL

## 2025-02-26 ENCOUNTER — HOSPITAL ENCOUNTER (OUTPATIENT)
Dept: RADIOLOGY | Facility: CLINIC | Age: 60
Discharge: HOME | End: 2025-02-26
Payer: COMMERCIAL

## 2025-02-26 DIAGNOSIS — M25.512 LEFT SHOULDER PAIN, UNSPECIFIED CHRONICITY: ICD-10-CM

## 2025-02-26 DIAGNOSIS — Z96.612 AFTERCARE FOLLOWING LEFT SHOULDER JOINT REPLACEMENT SURGERY: ICD-10-CM

## 2025-02-26 DIAGNOSIS — M25.512 LEFT SHOULDER PAIN, UNSPECIFIED CHRONICITY: Primary | ICD-10-CM

## 2025-02-26 DIAGNOSIS — Z47.1 AFTERCARE FOLLOWING LEFT SHOULDER JOINT REPLACEMENT SURGERY: ICD-10-CM

## 2025-02-26 PROCEDURE — 99213 OFFICE O/P EST LOW 20 MIN: CPT | Performed by: STUDENT IN AN ORGANIZED HEALTH CARE EDUCATION/TRAINING PROGRAM

## 2025-02-26 PROCEDURE — 73030 X-RAY EXAM OF SHOULDER: CPT | Mod: LT

## 2025-02-26 NOTE — PROGRESS NOTES
History: Patient returns to the office status post L RSA on 6 months ago. Patient has completed therapy and pain is well controlled. Denies numbness/tingling.  Feeling better than when I saw him 3 months ago.    Past medical history, past surgical history, medications, allergies, family history, social history, and review of systems were reviewed today and have been documented separately in this encounter.   A 12 point review of systems was negative other than as stated in the HPI.    Physical Examination:    On exam of the left upper extremity, incisions are well healed, without significant erythema or drainage. Demonstrates full ROM of the elbow/wrist/hand. Neurovascularly intact throughout. AROM of the shoulder today to , Er 30.     Imaging: Xrs of the operative shoulder were performed today. Personally interpreted by myself. Implants in stable alignment. No acute complications noted.     Assessment: 6 months s/p L RSA     Plan: Patient progressing as expected after surgery.  Patient did have some stiffness at the last visit, but has definitely turned a corner at this point.  Feels much better than before surgery.  I do suspect that he may continue to make gains over the next few months.  We will have him follow-up as needed.     All questions answered.     Dragon software was used to dictate this note, please be aware that minor errors in transcription may be present.    Tanner Padilla MD    Shoulder/Elbow Surgery  [facility]

## 2025-03-05 DIAGNOSIS — M32.9 SYSTEMIC LUPUS ERYTHEMATOSUS, UNSPECIFIED SLE TYPE, UNSPECIFIED ORGAN INVOLVEMENT STATUS (MULTI): ICD-10-CM

## 2025-03-05 DIAGNOSIS — Z79.899 LONG-TERM USE OF PLAQUENIL: ICD-10-CM

## 2025-03-05 RX ORDER — HYDROXYCHLOROQUINE SULFATE 200 MG/1
TABLET, FILM COATED ORAL 2 TIMES DAILY
Qty: 90 TABLET | Refills: 1 | Status: SHIPPED | OUTPATIENT
Start: 2025-03-05

## 2025-04-04 ENCOUNTER — APPOINTMENT (OUTPATIENT)
Dept: RHEUMATOLOGY | Facility: CLINIC | Age: 60
End: 2025-04-04
Payer: COMMERCIAL

## 2025-04-09 ENCOUNTER — APPOINTMENT (OUTPATIENT)
Dept: RHEUMATOLOGY | Facility: CLINIC | Age: 60
End: 2025-04-09
Payer: COMMERCIAL

## 2025-04-14 DIAGNOSIS — J42 CHRONIC BRONCHITIS, UNSPECIFIED CHRONIC BRONCHITIS TYPE (MULTI): ICD-10-CM

## 2025-04-14 RX ORDER — TIOTROPIUM BROMIDE AND OLODATEROL 3.124; 2.736 UG/1; UG/1
2 SPRAY, METERED RESPIRATORY (INHALATION) DAILY
Qty: 4 G | Refills: 5 | Status: SHIPPED | OUTPATIENT
Start: 2025-04-14

## 2025-04-14 RX ORDER — ALBUTEROL SULFATE 90 UG/1
2 AEROSOL, METERED RESPIRATORY (INHALATION) EVERY 6 HOURS PRN
Qty: 18 G | Refills: 5 | Status: SHIPPED | OUTPATIENT
Start: 2025-04-14

## 2025-04-15 DIAGNOSIS — N18.30 CHRONIC RENAL IMPAIRMENT, STAGE 3 (MODERATE), UNSPECIFIED WHETHER STAGE 3A OR 3B CKD (MULTI): Primary | ICD-10-CM

## 2025-04-15 RX ORDER — DAPAGLIFLOZIN 10 MG/1
10 TABLET, FILM COATED ORAL DAILY
Qty: 30 TABLET | Refills: 2 | Status: SHIPPED | OUTPATIENT
Start: 2025-04-15 | End: 2025-07-14

## 2025-05-21 ENCOUNTER — APPOINTMENT (OUTPATIENT)
Dept: RHEUMATOLOGY | Facility: CLINIC | Age: 60
End: 2025-05-21
Payer: COMMERCIAL

## 2025-05-27 DIAGNOSIS — J42 CHRONIC BRONCHITIS, UNSPECIFIED CHRONIC BRONCHITIS TYPE (MULTI): ICD-10-CM

## 2025-05-27 RX ORDER — ALBUTEROL SULFATE 90 UG/1
2 AEROSOL, METERED RESPIRATORY (INHALATION) EVERY 6 HOURS PRN
Qty: 18 G | Refills: 5 | Status: SHIPPED | OUTPATIENT
Start: 2025-05-27

## 2025-05-27 RX ORDER — TIOTROPIUM BROMIDE AND OLODATEROL 3.124; 2.736 UG/1; UG/1
2 SPRAY, METERED RESPIRATORY (INHALATION) DAILY
Qty: 4 G | Refills: 5 | Status: SHIPPED | OUTPATIENT
Start: 2025-05-27

## 2025-06-06 ENCOUNTER — APPOINTMENT (OUTPATIENT)
Dept: NEPHROLOGY | Facility: CLINIC | Age: 60
End: 2025-06-06
Payer: COMMERCIAL

## 2025-06-06 DIAGNOSIS — N18.30 STAGE 3 CHRONIC KIDNEY DISEASE, UNSPECIFIED WHETHER STAGE 3A OR 3B CKD (MULTI): Primary | ICD-10-CM

## 2025-06-09 ENCOUNTER — APPOINTMENT (OUTPATIENT)
Dept: PULMONOLOGY | Facility: HOSPITAL | Age: 60
End: 2025-06-09
Payer: MEDICARE

## 2025-06-09 NOTE — PROGRESS NOTES
Patient: Allen Vela    [id number]  : 1965 -- AGE 59 y.o.    Provider: KERVIN Emanuel-CNP     Location [facility]   Service Date: 2025              [facility]  Follow up Visit Note      HISTORY OF PRESENT ILLNESS     The patient's referring provider is: No ref. provider found    HISTORY OF PRESENT ILLNESS   Allen Vela is a 59 y.o. male who presents to a [facility] for an evaluation with concerns of No chief complaint on file.. I have independently interviewed and examined the patient in the office and reviewed available records.    Current History    Mr. Vela is a 59 year old AAM () here for follow up for COPD. Last seen in clinic on 25.     Since last visit     cough   wheezing   RUCKER   SOB at rest   allergies   GERD   chest pain   snoring     25: Since last visit he has been doing well. He received a prednisone course last visit. He felt at that time he was having fluid retention - had LE edema at that time. He has been using his stiolto daily, albuterol HFA 2x a day, and albuterol nebulizers not since our last visit. PFTs scheduled last this month.  He denies any cough, wheezing, SOB at rest, or CP. RUCKER has improved back to his baseline - has RUCKER with going up stairs. He lives on the 3rd floor. He has intermittent nasal congestion - uses an OTC nasal spray PRN. His GERD is under good control with daily pantoprazole.   CAT Today 18     24: On today's visit, the patient reports he was previously using advair -- was switched to stiolto related to concern for pneumonia. He states it was after his breathing tests. He has not had pneumonia over the last year. He was hospitalized in  for pneumonia for two weeks.  He states he had pneumonia 2-3 x prior to that and each time he had to be hospitalized. He is using stiolto daily and albuterol HFA (ventolin) - lately several times per  day. He has a nebulizer machine, but has been using and it does not seem to be helping. His PCP gave him a medrol dose pack last week - has not seemed to help. He feels he might have bronchitis. He has an occasional cough that can be productive. He states his chest feels irritated/ tight. He has been noticing wheezing. He feels his RUCKER has been worse over the last 2-3 weeks. He lives on the 3rd floor (of his sisters home). He has had issues with doing the steps. Normally he gets a little bit winded vs no issues. He has had SOB at rest. He had a slight sore throat - comes and goes. He has sinus congestion - uses a nasal spray if he absolutely has to. He feels this has been slightly worse since turning the heat on. He feels his GERD is under good control with daily pantoprazole. He has tooth pain - has an appt with the dentist tomorrow. He states the tooth has been bothering him more over the last month. He was supposed to have it pulled, but hasn't yet. Dentist pulled 3 other teeth recently. He has stabbing pain with eating/ drinking. He has not received any antibiotics from the dentist related to his teeth.  He denies any LE edema.  He got his flu shot 9/11 - he is not sure if that could have been the start of his breathing getting worse.  He did feel run down after receiving it.    CAT today 19     8/30/23 - Dr. Gaxiola - Mr Queen is a 59 yo man with PMHx s/f Asthma, COPD (no PFTs on record), SLE (on Plaquenil), CKD, HTN, DLD who presents to pulmonary clinic to establish care.   Patient reports diagnosed with childhood asthma, only prescribed PRN inhaler up to 4-5 years ago at which time he was diagnosed with COPD at [facility] based on PFTs. At that time he was started on Advair BID, continued on Albuterol PRN. He reports breathing is normally at baseline unless he is admitted with a respiratory infection. Last hospitalization was 03/2023 for pneumonia. He required supplemental oxygen while hospitalized but that was  stopped on discharged. He does report significant worsening of his breathing immediately post hospitalization that is now most improved. He currently denies any limitations in terms of his breathing, except with prolonged exertion (vacuuming, etc). He has a seldom cough, non productive mostly. He denies fevers, chills. Prior pneumonia was 2 years prior. Denies ever being admitted to ICU or requiring intubation for asthma/COPD. He denies chest pain, orthopnea, PND. Previously had LE edema which is now resolved. He previously had severe GERD symptoms which are now under well controlled. He was previously staying at an apartment with mold issues, had severe allergic rhinitis symptoms which are now resolved.    Previous pulmonary history: He was previously told he has asthma/ COPD.     Inhalers/nebulized medications: advair, albuterol     Hospitalization History: He has not been hospitalized over the last year for breathing related problem.    Sleep history: Hx of AMRITA - not able to use CPAP     ALLERGIES AND MEDICATIONS     ALLERGIES  Allergies   Allergen Reactions    Montelukast Shortness of breath     Makes breathing worse    Ace Inhibitors Swelling     Pt reports he developed lip swelling when he was given accupril and lisinopril  in the past       MEDICATIONS  Current Outpatient Medications   Medication Sig Dispense Refill    acetaminophen (Tylenol) 500 mg tablet Take 2 tablets (1,000 mg) by mouth every 6 hours if needed for mild pain (1 - 3). Takes 2-3 tabs      allopurinol (Zyloprim) 100 mg tablet Take 1 tablet (100 mg) by mouth once daily.      amLODIPine (Norvasc) 5 mg tablet Take 1 tablet (5 mg) by mouth once daily.      atenolol (Tenormin) 100 mg tablet Take 1 tablet (100 mg) by mouth once daily.      atorvastatin (Lipitor) 20 mg tablet Take 1 tablet (20 mg) by mouth once daily.      buPROPion XL (Wellbutrin XL) 150 mg 24 hr tablet 1 tablet (150 mg) once daily in the morning.      cholecalciferol (Vitamin D-3)  50 mcg (2,000 unit) capsule Take 1 capsule (50 mcg) by mouth once daily.      dapagliflozin propanediol (Farxiga) 10 mg tablet Take 1 tablet (10 mg) by mouth once daily. 30 tablet 2    DULoxetine (Cymbalta) 60 mg DR capsule Take 1 capsule (60 mg) by mouth once daily.      furosemide (Lasix) 20 mg tablet Take 1 tablet (20 mg) by mouth once daily.      guaiFENesin (Mucinex) 600 mg 12 hr tablet Take 1 tablet (600 mg) by mouth every 12 hours if needed.      hydroxychloroquine (Plaquenil) 200 mg tablet TAKE 1 TABLET BY MOUTH TWICE DAILY 90 tablet 1    hydrOXYzine HCL (Atarax) 50 mg tablet Take 1 tablet (50 mg) by mouth once daily as needed.      ipratropium-albuteroL (Duo-Neb) 0.5-2.5 mg/3 mL nebulizer solution Inhale 3 mL every 6 hours if needed.      multivitamin tablet Take 1 tablet by mouth once daily.      pantoprazole (ProtoNix) 40 mg EC tablet Take 1 tablet (40 mg) by mouth 2 times a day.      spironolactone (Aldactone) 25 mg tablet 1 tablet (25 mg) once daily.      tamsulosin (Flomax) 0.4 mg 24 hr capsule Take 1 capsule (0.4 mg) by mouth. AT BEDTIME      tiotropium-olodateroL (Stiolto Respimat) 2.5-2.5 mcg/actuation mist inhaler Inhale 2 Inhalations once daily. 4 g 5    traMADol (Ultram) 50 mg tablet Take 1 tablet (50 mg) by mouth 4 times a day as needed.      Ventolin HFA 90 mcg/actuation inhaler Inhale 2 puffs every 6 hours if needed for wheezing or shortness of breath. 18 g 5     No current facility-administered medications for this visit.         PAST HISTORY     PAST MEDICAL HISTORY  - anemia   - anxiety / depression (on ketamine for depression)  - CKD   - asthma/ COPD   - HTN   - HLD   - SLE - on plaquenil   - AMRITA - not on CPAP     PAST SURGICAL HISTORY  Past Surgical History:   Procedure Laterality Date    COLONOSCOPY      LUMBAR EPIDURAL INJECTION  05/14/2024    WISDOM TOOTH EXTRACTION      as a teen       IMMUNIZATION HISTORY  Immunization History   Administered Date(s) Administered    Moderna COVID-19  vaccine, 12 years and older (50mcg/0.5mL)(Spikevax) 02/06/2025    Moderna SARS-CoV-2 Vaccination 03/10/2021    Pfizer COVID-19 vaccine, 12 years and older, (30mcg/0.3mL) (Comirnaty) 11/10/2023    Pfizer COVID-19 vaccine, bivalent, age 12 years and older (30 mcg/0.3 mL) 06/22/2023       SOCIAL HISTORY  Smoking: Former smoker 25 pack years (1.5ppd)- quit in 2010   Alcohol: none   Illicit drugs:  THC/ CBD edibles     OCCUPATIONAL/ENVIRONMENTAL HISTORY  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 KEVIN 2     Chest Radiograph     XR chest 2 views 10/09/2024  Impression  1.  No evidence of acute cardiopulmonary process.        Chest CT Scan     3/7/23 -   Impression   Bilateral infiltrates consistent with pneumonia. Follow-up to ensure  resolution after appropriate treatment recommended.     5/13/23 - Resolution of infiltrates in the chest. No acute finding seen   detected. Enlarged main pulmonary artery compatible pulmonary   arterial hypertension     4/30/24 - Compared to prior examination of 05/12/2023, there has been  interval development of several new nodules throughout lungs  bilaterally, measuring up to 7 mm in the left upper lobe. Overall,  the findings are favored to represent an infectious/inflammatory in  etiology. Follow-up with chest CT in 6 months is recommended to  document resolution.  2. No focal consolidation.  3. Additional findings as above.    11/2/24 - Resolution of previously seen 7 mm left upper lobe nodule and  resolution/improvement of additional minute nodularities. No new  suspicious lesions seen. Additional chronic findings including  atherosclerotic vascular calcifications and colonic  "diverticulosis.    Echocardiogram     3/2/23- Left ventricular systolic function is normal with a 60-65% estimated ejection fraction.  2. Spectral Doppler shows a pseudonormal pattern of left ventricular diastolic filling.  3. Moderately elevated right ventricular systolic pressure.  RA mildly dilated. RV normal size and function        Other testing/ Labs      Eosinophils Absolute (x10*3/uL)   Date Value   12/04/2024 0.11   10/09/2024 0.00   08/14/2024 0.20     No results found for: \"IGE\"    Respiratory Culture  No results found for: \"RESPCULTSM\", \"GRAMSTAIN\"  No results found for the last 90 days.      Fungal Culture  No results found for: \"FUNGALCULTSM\", \"FUNGALSMEAR\"    AFB Culture  No results found for: \"AFBCX\", \"AFBSTAIN\"      REVIEW OF SYSTEMS     REVIEW OF SYSTEMS  Review of Systems      PHYSICAL EXAM     VITAL SIGNS: There were no vitals taken for this visit.     CURRENT WEIGHT: [unfilled]  BMI: [unfilled]  PREVIOUS WEIGHTS:  Wt Readings from Last 3 Encounters:   01/06/25 123 kg (271 lb)   12/13/24 125 kg (275 lb)   12/04/24 127 kg (281 lb)       Physical Exam    ASSESSMENT/PLAN     #COPD: Pfts : mild obstruction with BD resp. Recurrent pneumonias - switched from advair to stiolto related to this. May need intermittent ICS depending on symptom improvement with steroids.    - PFTs ordered- scheduled upcoming    - continue stiolto daily  - continue albuterol HFA inhaler 2 puffs or albuterol nebulizer treatment every 4-6 hours as needed for shortness of breath     -referral to pulmonary rehab pending PFTs   -UTD with vaccines     #Lung cancer screening: CT 11/2 with resolution of 7mm nodule   - lung cancer screening in 11/2025     # Allergic rhinitis:   - continue nasal saline spray each nostril 1-2 x per day - remember to aim towards your ear -- and whichever OTC nasal spray you have been using     Thank you for visiting the Pulmonary clinic today!   Return to clinic 6 months or sooner if needed   Yoanna" Martínez CANCHOLA  My office -  [phone]- Giovanny is my . April is my nurse [phone].   Radiology scheduling [phone]   Appointment scheduling [phone]   Pulmonary function testing - [phone]

## 2025-06-15 DIAGNOSIS — M32.9 SYSTEMIC LUPUS ERYTHEMATOSUS, UNSPECIFIED SLE TYPE, UNSPECIFIED ORGAN INVOLVEMENT STATUS (MULTI): ICD-10-CM

## 2025-06-15 DIAGNOSIS — Z79.899 LONG-TERM USE OF PLAQUENIL: ICD-10-CM

## 2025-06-17 RX ORDER — HYDROXYCHLOROQUINE SULFATE 200 MG/1
TABLET, FILM COATED ORAL 2 TIMES DAILY
Qty: 90 TABLET | Refills: 1 | Status: SHIPPED | OUTPATIENT
Start: 2025-06-17

## 2025-06-27 DIAGNOSIS — N18.30 STAGE 3 CHRONIC KIDNEY DISEASE, UNSPECIFIED WHETHER STAGE 3A OR 3B CKD (MULTI): Primary | ICD-10-CM

## 2025-07-02 ENCOUNTER — APPOINTMENT (OUTPATIENT)
Dept: OPHTHALMOLOGY | Facility: CLINIC | Age: 60
End: 2025-07-02
Payer: COMMERCIAL

## 2025-07-02 DIAGNOSIS — H43.812 PVD (POSTERIOR VITREOUS DETACHMENT), LEFT EYE: ICD-10-CM

## 2025-07-02 DIAGNOSIS — Z79.899 LONG-TERM USE OF PLAQUENIL: ICD-10-CM

## 2025-07-02 DIAGNOSIS — J42 CHRONIC BRONCHITIS, UNSPECIFIED CHRONIC BRONCHITIS TYPE (MULTI): ICD-10-CM

## 2025-07-02 DIAGNOSIS — H52.4 PRESBYOPIA OF BOTH EYES: Primary | ICD-10-CM

## 2025-07-02 PROCEDURE — 92015 DETERMINE REFRACTIVE STATE: CPT | Performed by: STUDENT IN AN ORGANIZED HEALTH CARE EDUCATION/TRAINING PROGRAM

## 2025-07-02 PROCEDURE — 92134 CPTRZ OPH DX IMG PST SGM RTA: CPT | Performed by: STUDENT IN AN ORGANIZED HEALTH CARE EDUCATION/TRAINING PROGRAM

## 2025-07-02 PROCEDURE — 92014 COMPRE OPH EXAM EST PT 1/>: CPT | Performed by: STUDENT IN AN ORGANIZED HEALTH CARE EDUCATION/TRAINING PROGRAM

## 2025-07-02 PROCEDURE — 92083 EXTENDED VISUAL FIELD XM: CPT | Performed by: STUDENT IN AN ORGANIZED HEALTH CARE EDUCATION/TRAINING PROGRAM

## 2025-07-02 RX ORDER — GLYCOPYRROLATE AND FORMOTEROL FUMARATE 9; 4.8 UG/1; UG/1
2 AEROSOL, METERED RESPIRATORY (INHALATION) 2 TIMES DAILY
Qty: 10.7 G | Refills: 3 | Status: SHIPPED | OUTPATIENT
Start: 2025-07-02

## 2025-07-02 ASSESSMENT — REFRACTION_WEARINGRX
OD_AXIS: 035
OD_ADD: +2.25
OS_CYLINDER: SPHERE
OS_ADD: +2.25
OD_SPHERE: +0.50
OD_CYLINDER: -0.50
OS_SPHERE: PLANO

## 2025-07-02 ASSESSMENT — ENCOUNTER SYMPTOMS
HEMATOLOGIC/LYMPHATIC NEGATIVE: 0
MUSCULOSKELETAL NEGATIVE: 0
ALLERGIC/IMMUNOLOGIC NEGATIVE: 0
RESPIRATORY NEGATIVE: 0
NEUROLOGICAL NEGATIVE: 0
CONSTITUTIONAL NEGATIVE: 0
CARDIOVASCULAR NEGATIVE: 0
GASTROINTESTINAL NEGATIVE: 0
ENDOCRINE NEGATIVE: 0
EYES NEGATIVE: 0
PSYCHIATRIC NEGATIVE: 0

## 2025-07-02 ASSESSMENT — CONF VISUAL FIELD
OD_NORMAL: 1
OD_INFERIOR_NASAL_RESTRICTION: 0
METHOD: COUNTING FINGERS
OS_SUPERIOR_NASAL_RESTRICTION: 0
OD_SUPERIOR_TEMPORAL_RESTRICTION: 0
OD_SUPERIOR_NASAL_RESTRICTION: 0
OS_INFERIOR_NASAL_RESTRICTION: 0
COMMENTS: SEE HVF
OD_INFERIOR_TEMPORAL_RESTRICTION: 0
OS_INFERIOR_TEMPORAL_RESTRICTION: 0
OS_SUPERIOR_TEMPORAL_RESTRICTION: 0
OS_NORMAL: 1

## 2025-07-02 ASSESSMENT — CUP TO DISC RATIO
OS_RATIO: .35
OD_RATIO: .35

## 2025-07-02 ASSESSMENT — VISUAL ACUITY
CORRECTION_TYPE: GLASSES
METHOD: SNELLEN - LINEAR
OS_CC: 20/20
OS_CC+: -1
OD_CC+: -1
OD_CC: 20/20

## 2025-07-02 ASSESSMENT — TONOMETRY
OD_IOP_MMHG: 13
IOP_METHOD: GOLDMANN APPLANATION
OS_IOP_MMHG: 13

## 2025-07-02 ASSESSMENT — SLIT LAMP EXAM - LIDS
COMMENTS: NORMAL, MGD UL/LL
COMMENTS: NORMAL, MGD UL/LL

## 2025-07-02 ASSESSMENT — EXTERNAL EXAM - RIGHT EYE: OD_EXAM: NORMAL

## 2025-07-02 ASSESSMENT — EXTERNAL EXAM - LEFT EYE: OS_EXAM: NORMAL

## 2025-07-02 ASSESSMENT — REFRACTION_MANIFEST
OD_ADD: +2.50
OS_SPHERE: -0.50
OS_CYLINDER: SPHERE
OD_SPHERE: PLANO
OD_CYLINDER: SPHERE
OS_ADD: +2.50

## 2025-07-02 NOTE — PROGRESS NOTES
Assessment/Plan   Diagnoses and all orders for this visit:  Long-term use of Plaquenil  -Hydroxychloroquine PO 200mg since 2009 for Lupus  -Studies reveal that the risk of maculopathy when taking Plaquenil is 0% (0-5 years), 1% (over 5 years), 2% (over 10 years), and 20% cumulative, or 4% annual incidence (over 20 years) respectively.  Annual testing with 10-2 threshold visual field perimetry, as well as spectral domain optical coherence tomography of the macula is recommended. No signs of plaquenil toxicity today od/os, macula OCT today, HVF 10-2 today.  monitor 1 year or sooner with any acute vision change. HVF 10-2 and OCT Mac at next comprehensive exam.  Presbyopia of both eyes  -New spec rx released today per patient request. Ocular health wnl for age OU. Monitor 1 year or sooner prn. Refraction billed today.  Posterior Vitreous Detachment Left eye  -patient reports symptoms of floaters 9-12 months left eye  -PVD noted OS-discussed etiology for pt today  -given longstanding symptoms monitor at this time  -normal retinal exam without holes or tears   -Discussed signs and symtpoms of retinal hole, tear, detachment. Patient consents to return if they notice new floaters, flashes, curtain or veil covering vision.    RTC 1 year for annual with DFE, MRX, OCT MAC, HVF 10-2

## 2025-07-03 ENCOUNTER — APPOINTMENT (OUTPATIENT)
Dept: PRIMARY CARE | Facility: CLINIC | Age: 60
End: 2025-07-03
Payer: MEDICARE

## 2025-07-15 ENCOUNTER — APPOINTMENT (OUTPATIENT)
Dept: PHARMACY | Facility: HOSPITAL | Age: 60
End: 2025-07-15
Payer: MEDICARE

## 2025-07-17 ENCOUNTER — APPOINTMENT (OUTPATIENT)
Dept: DERMATOLOGY | Facility: CLINIC | Age: 60
End: 2025-07-17
Payer: COMMERCIAL

## 2025-07-17 DIAGNOSIS — L21.9 SEBORRHEIC DERMATITIS: ICD-10-CM

## 2025-07-17 DIAGNOSIS — L60.8 LONGITUDINAL MELANONYCHIA: Primary | ICD-10-CM

## 2025-07-17 PROCEDURE — 99204 OFFICE O/P NEW MOD 45 MIN: CPT | Performed by: STUDENT IN AN ORGANIZED HEALTH CARE EDUCATION/TRAINING PROGRAM

## 2025-07-17 RX ORDER — HYDROCORTISONE 25 MG/G
OINTMENT TOPICAL
Qty: 120 G | Refills: 3 | Status: SHIPPED | OUTPATIENT
Start: 2025-07-17

## 2025-07-17 RX ORDER — KETOCONAZOLE 20 MG/ML
SHAMPOO, SUSPENSION TOPICAL
Qty: 120 ML | Refills: 11 | Status: SHIPPED | OUTPATIENT
Start: 2025-07-20

## 2025-07-17 ASSESSMENT — DERMATOLOGY QUALITY OF LIFE (QOL) ASSESSMENT
RATE HOW EMOTIONALLY BOTHERED YOU ARE BY YOUR SKIN PROBLEM (FOR EXAMPLE, WORRY, EMBARRASSMENT, FRUSTRATION): 0 - NEVER BOTHERED
RATE HOW EMOTIONALLY BOTHERED YOU ARE BY YOUR SKIN PROBLEM (FOR EXAMPLE, WORRY, EMBARRASSMENT, FRUSTRATION): 0 - NEVER BOTHERED
RATE HOW BOTHERED YOU ARE BY SYMPTOMS OF YOUR SKIN PROBLEM (EG, ITCHING, STINGING BURNING, HURTING OR SKIN IRRITATION): 3
RATE HOW BOTHERED YOU ARE BY SYMPTOMS OF YOUR SKIN PROBLEM (EG, ITCHING, STINGING BURNING, HURTING OR SKIN IRRITATION): 3
RATE HOW BOTHERED YOU ARE BY EFFECTS OF YOUR SKIN PROBLEMS ON YOUR ACTIVITIES (EG, GOING OUT, ACCOMPLISHING WHAT YOU WANT, WORK ACTIVITIES OR YOUR RELATIONSHIPS WITH OTHERS): 0 - NEVER BOTHERED
RATE HOW BOTHERED YOU ARE BY EFFECTS OF YOUR SKIN PROBLEMS ON YOUR ACTIVITIES (EG, GOING OUT, ACCOMPLISHING WHAT YOU WANT, WORK ACTIVITIES OR YOUR RELATIONSHIPS WITH OTHERS): 0 - NEVER BOTHERED

## 2025-07-17 NOTE — Clinical Note
New onset, erythematous subtle scaling patches with hypopigmentation focally on chin/upper lip also on frontal scalp and mid scalp

## 2025-07-17 NOTE — PROGRESS NOTES
Subjective     Allen Vela is a 59 y.o. male who presents for the following: Eczema (Appears on face and scalp. Pt hasn't treated it with anything) and Nail Problem (Black streaks on nails on hands).     Intake Questions  Do you have any new or changing Lesions?: (Patient-Rptd) (P) No  Are you an organ transplant recipient?: (Patient-Rptd) (P) No  Have you had or do you have a Staph Infection?: (Patient-Rptd) (P) No  Have you had or do you have Vacular Disease?: (Patient-Rptd) (P) No  Do you use sunscreen?: (Patient-Rptd) (P) None  Do you use a tanning bed?: (Patient-Rptd) (P) No    Review of Systems:  No other skin or systemic complaints other than what is documented elsewhere in the note.    The following portions of the chart were reviewed this encounter and updated as appropriate:          Skin Cancer History  Biopsy Log Book  No skin cancers from Specimen Tracking.    Additional History      Specialty Problems    None       Objective   Well appearing patient in no apparent distress; mood and affect are within normal limits.    A focused skin examination was performed. All findings within normal limits unless otherwise noted below.    Assessment/Plan   Skin Exam  1. LONGITUDINAL MELANONYCHIA  Right 5th Fingernail  Diffusely throughout all fingernails  Will monitor  Per patient, ongoing for decades and no recent change  No one areas appears more irregular  If any area changes, notify me and will evaluate promptly  2. SEBORRHEIC DERMATITIS  Mid Frontal Scalp, Mid Lower Cutaneous Lip, Philtrum  New onset, erythematous subtle scaling patches with hypopigmentation focally on chin/upper lip also on frontal scalp and mid scalp  This Visit  - ketoconazole (NIZOral) 2 % shampoo - Shampoo face and scalp, leave on for 8 minutes, rinse off, do 3 times weekly Do not fill before July 20, 2025.  - hydrocortisone 2.5 % ointment - Use thin layer on scaling areas massage into beard, do twice weekly    This is a new  diagnosis  It is chronic and is poorly controlled  Rx  management needed today

## 2025-07-17 NOTE — Clinical Note
Diffusely throughout all fingernails  Will monitor  Per patient, ongoing for decades and no recent change  No one areas appears more irregular  If any area changes, notify me and will evaluate promptly

## 2025-07-18 LAB
25(OH)D3+25(OH)D2 SERPL-MCNC: 42 NG/ML (ref 30–100)
ALBUMIN SERPL-MCNC: 4.4 G/DL (ref 3.6–5.1)
ALBUMIN/CREAT UR: 15 MG/G CREAT
APPEARANCE UR: CLEAR
BILIRUB UR QL STRIP: NEGATIVE
BUN SERPL-MCNC: 21 MG/DL (ref 7–25)
BUN/CREAT SERPL: 10 (CALC) (ref 6–22)
CALCIUM SERPL-MCNC: 9.9 MG/DL (ref 8.6–10.3)
CHLORIDE SERPL-SCNC: 103 MMOL/L (ref 98–110)
CO2 SERPL-SCNC: 27 MMOL/L (ref 20–32)
COLOR UR: YELLOW
CREAT SERPL-MCNC: 2.14 MG/DL (ref 0.7–1.35)
CREAT UR-MCNC: 52 MG/DL (ref 20–320)
EGFRCR SERPLBLD CKD-EPI 2021: 34 ML/MIN/1.73M2
ERYTHROCYTE [DISTWIDTH] IN BLOOD BY AUTOMATED COUNT: 13.9 % (ref 11–15)
GLUCOSE SERPL-MCNC: 88 MG/DL (ref 65–99)
GLUCOSE UR QL STRIP: NEGATIVE
HCT VFR BLD AUTO: 39.9 % (ref 38.5–50)
HGB BLD-MCNC: 12.4 G/DL (ref 13.2–17.1)
HGB UR QL STRIP: NEGATIVE
KETONES UR QL STRIP: NEGATIVE
LEUKOCYTE ESTERASE UR QL STRIP: NEGATIVE
MCH RBC QN AUTO: 32.6 PG (ref 27–33)
MCHC RBC AUTO-ENTMCNC: 31.1 G/DL (ref 32–36)
MCV RBC AUTO: 105 FL (ref 80–100)
MICROALBUMIN UR-MCNC: 0.8 MG/DL
NITRITE UR QL STRIP: NEGATIVE
PH UR STRIP: 6.5 [PH] (ref 5–8)
PHOSPHATE SERPL-MCNC: 3 MG/DL (ref 2.5–4.5)
PLATELET # BLD AUTO: 266 THOUSAND/UL (ref 140–400)
PMV BLD REES-ECKER: 9.7 FL (ref 7.5–12.5)
POTASSIUM SERPL-SCNC: 4.3 MMOL/L (ref 3.5–5.3)
PROT UR QL STRIP: NEGATIVE
PTH-INTACT SERPL-MCNC: 75 PG/ML (ref 16–77)
RBC # BLD AUTO: 3.8 MILLION/UL (ref 4.2–5.8)
SODIUM SERPL-SCNC: 141 MMOL/L (ref 135–146)
SP GR UR STRIP: 1.01 (ref 1–1.03)
WBC # BLD AUTO: 5.9 THOUSAND/UL (ref 3.8–10.8)

## 2025-07-31 ENCOUNTER — APPOINTMENT (OUTPATIENT)
Dept: PHARMACY | Facility: HOSPITAL | Age: 60
End: 2025-07-31
Payer: MEDICARE

## 2025-07-31 DIAGNOSIS — N18.32 CHRONIC RENAL IMPAIRMENT, STAGE 3B (MULTI): Primary | ICD-10-CM

## 2025-07-31 RX ORDER — DAPAGLIFLOZIN 10 MG/1
10 TABLET, FILM COATED ORAL DAILY
Qty: 30 TABLET | Refills: 3 | Status: SHIPPED | OUTPATIENT
Start: 2025-07-31 | End: 2025-08-31

## 2025-07-31 NOTE — PROGRESS NOTES
Pharmacist Clinic: Nephrology Management    Allen Vela is a 60 y.o. male was referred to Clinical Pharmacy Team for ckd management.     Referring Provider: Izabela Lundberg APRN-CNP        Allergies Reviewed? Yes    Allergies[1]    Medications Ordered Prior to Encounter[2]    PHARMACEUTICAL ASSESSMENT:    MEDICATION RECONCILIATION    Was a medication reconciliation completed at this visit? Yes  Home Pharmacy Reviewed? Yes, describe: [facility]      Drug Interactions? No    Medication Documentation Review Audit       Reviewed by Nellie Rodriguez RN (Registered Nurse) on 25 at 1417      Medication Order Taking? Sig Documenting Provider Last Dose Status   acetaminophen (Tylenol) 500 mg tablet [id number] Yes Take 2 tablets (1,000 mg) by mouth every 6 hours if needed for mild pain (1 - 3). Takes 2-3 tabs Historical Provider, MD Taking Active   allopurinol (Zyloprim) 100 mg tablet [id number] Yes Take 1 tablet (100 mg) by mouth once daily. Historical Provider, MD Taking Active   amLODIPine (Norvasc) 5 mg tablet [id number] Yes Take 1 tablet (5 mg) by mouth once daily. Historical Provider, MD Taking Active   atenolol (Tenormin) 100 mg tablet [id number] Yes Take 1 tablet (100 mg) by mouth once daily. Historical Provider, MD Taking Active   atorvastatin (Lipitor) 20 mg tablet [id number] Yes Take 1 tablet (20 mg) by mouth once daily. Historical Provider, MD Taking Active   buPROPion XL (Wellbutrin XL) 150 mg 24 hr tablet [id number] Yes 1 tablet (150 mg) once daily in the morning. Historical Provider, MD Taking Active   cholecalciferol (Vitamin D-3) 50 mcg (2,000 unit) capsule [id number] Yes Take 1 capsule (2,000 Units) by mouth once daily. Historical Provider, MD Taking Active   dapagliflozin propanediol (Farxiga) 10 mg tablet [id number]  Take 1 tablet (10 mg) by mouth once daily. KEENA Santiago   25 2359   DULoxetine (Cymbalta) 60 mg DR capsule [id number] Yes Take 1 capsule (60 mg) by mouth once daily. Historical  Provider, MD Taking Active   furosemide (Lasix) 20 mg tablet [id number] Yes Take 1 tablet (20 mg) by mouth once daily. Historical Provider, MD Taking Active   glycopyrrolate-formoteroL (Bevespi Aerosphere) 9-4.8 mcg HFA aerosol inhaler [id number] Yes Inhale 2 Inhalations 2 times a day. KEENA Emanuel  Active   guaiFENesin (Mucinex) 600 mg 12 hr tablet [id number] Yes Take 1 tablet (600 mg) by mouth every 12 hours if needed. Historical Provider, MD Taking Active   hydroxychloroquine (Plaquenil) 200 mg tablet [id number] Yes TAKE 1 TABLET BY MOUTH TWICE DAILY Adriano Manley MD  Active   hydrOXYzine HCL (Atarax) 50 mg tablet [id number] Yes Take 1 tablet (50 mg) by mouth once daily as needed. Historical Provider, MD Taking Active   ipratropium-albuteroL (Duo-Neb) 0.5-2.5 mg/3 mL nebulizer solution [id number] Yes Inhale 3 mL every 6 hours if needed. Historical Provider, MD Taking Active   multivitamin tablet [id number] Yes Take 1 tablet by mouth once daily. Historical Provider, MD Taking Active   pantoprazole (ProtoNix) 40 mg EC tablet [id number] Yes Take 1 tablet (40 mg) by mouth 2 times a day. Historical Provider, MD Taking Active   spironolactone (Aldactone) 25 mg tablet [id number] Yes 1 tablet (25 mg) once daily. Historical Provider, MD Taking Active   tamsulosin (Flomax) 0.4 mg 24 hr capsule [id number] No Take 1 capsule (0.4 mg) by mouth. AT BEDTIME Historical Provider, MD Taking Active   tiotropium-olodateroL (Stiolto Respimat) 2.5-2.5 mcg/actuation mist inhaler [id number]  Inhale 2 Inhalations once daily. KEENA Emanuel  Active   traMADol (Ultram) 50 mg tablet [id number] Yes Take 1 tablet (50 mg) by mouth 4 times a day as needed. Historical Provider, MD Taking Active   Ventolin HFA 90 mcg/actuation inhaler [id number] Yes Inhale 2 puffs every 6 hours if needed for wheezing or shortness of breath. Yoanna A Soliz, APRN-CNP  Active                    DISEASE MANAGEMENT ASSESSMENT:         Lab  Review  Electrolytes:      Lab Results   Component Value Date    BILITOT 0.7 01/15/2025    CALCIUM 9.9 07/17/2025    CO2 27 07/17/2025     07/17/2025    CREATININE 2.14 (H) 07/17/2025    GLUCOSE 88 07/17/2025    ALKPHOS 53 01/15/2025    K 4.3 07/17/2025    PROT 6.7 01/15/2025     07/17/2025    AST 16 01/15/2025    ALT 15 01/15/2025    BUN 21 07/17/2025    ANIONGAP 18 01/15/2025    MG 2.00 03/04/2023    PHOS 3.0 07/17/2025    ALBUMIN 4.4 07/17/2025    LIPASE 25 03/01/2023    GFRMALE 29 (A) 08/02/2023            HISTORY OF PRESENT ILLNESS    CKD ASSESSMENT    Stage: 3b (eGFR 30-44)    Last GFR:   Lab Results   Component Value Date    EGFR 34 (L) 07/17/2025    EGFR 37 (L) 01/15/2025    EGFR 38 (L) 12/04/2024       Last Albumin/Creatine Ratio:   ALBUMIN/CREATININE RATIO, RANDOM URINE   Date Value Ref Range Status   07/17/2025 15 <30 mg/g creat Final     Comment:        The ADA defines abnormalities in albumin  excretion as follows:     Albuminuria Category        Result (mg/g creatinine)     Normal to Mildly increased   <30  Moderately increased            Severely increased           > OR = 300     The ADA recommends that at least two of three  specimens collected within a 3-6 month period be  abnormal before considering a patient to be  within a diagnostic category.       Albumin/Creatinine Ratio   Date Value Ref Range Status   02/16/2024 25.7 <30.0 ug/mg Creat Final     Albumin/Creatine Ratio   Date Value Ref Range Status   06/12/2023 44.2 (H) 0.0 - 30.0 ug/mg crt Final           MEDICATIONS REQUIRING RENAL DOSE ADJUSTMENTS:    After review, all medications are dosed appropriately   The following medications increase risk of ELIER and should be closely monitored:  Aldosterone Antagonist and Diuretics    DISCUSSION/ASSESSMENT:   Discussed:   Farxiga brand name preferred by insurance $4.80/month    EDUCATION:   Counseled patient on MOA, expectations, side effects, duration of therapy, contraindications,  administration, and monitoring parameters  Recommended maintaining a diet with a protein intake no more than 0.8 g/kg body weight per day and a sodium intake no more than 2 grams per day  For patients with HTN, recommended that we target a systolic blood pressure less than 120 mm Hg, when tolerated.  Answered all patient questions and concerns  Assessment/Plan   Problem List Items Addressed This Visit    None      RECOMMENDATIONS/PLAN    Start Farxiga 10mg qd      Gregory Cronin PharmD    Verbal consent to manage patient's drug therapy was obtained from the patient . They were informed they may decline to participate or withdraw from participation in pharmacy services at any time.    Continue all meds under the continuation of care with the referring provider and clinical pharmacy team.         [1]   Allergies  Allergen Reactions    Montelukast Shortness of breath     Makes breathing worse    Ace Inhibitors Swelling     Pt reports he developed lip swelling when he was given accupril and lisinopril  in the past   [2]   Current Outpatient Medications on File Prior to Visit   Medication Sig Dispense Refill    acetaminophen (Tylenol) 500 mg tablet Take 2 tablets (1,000 mg) by mouth every 6 hours if needed for mild pain (1 - 3). Takes 2-3 tabs      allopurinol (Zyloprim) 100 mg tablet Take 1 tablet (100 mg) by mouth once daily.      amLODIPine (Norvasc) 5 mg tablet Take 1 tablet (5 mg) by mouth once daily.      atenolol (Tenormin) 100 mg tablet Take 1 tablet (100 mg) by mouth once daily.      atorvastatin (Lipitor) 20 mg tablet Take 1 tablet (20 mg) by mouth once daily.      buPROPion XL (Wellbutrin XL) 150 mg 24 hr tablet 1 tablet (150 mg) once daily in the morning.      cholecalciferol (Vitamin D-3) 50 mcg (2,000 unit) capsule Take 1 capsule (2,000 Units) by mouth once daily.      dapagliflozin propanediol (Farxiga) 10 mg tablet Take 1 tablet (10 mg) by mouth once daily. 30 tablet 2    DULoxetine (Cymbalta) 60 mg   capsule Take 1 capsule (60 mg) by mouth once daily.      furosemide (Lasix) 20 mg tablet Take 1 tablet (20 mg) by mouth once daily.      glycopyrrolate-formoteroL (Bevespi Aerosphere) 9-4.8 mcg HFA aerosol inhaler Inhale 2 Inhalations 2 times a day. 10.7 g 3    guaiFENesin (Mucinex) 600 mg 12 hr tablet Take 1 tablet (600 mg) by mouth every 12 hours if needed.      hydrocortisone 2.5 % ointment Use thin layer on scaling areas massage into beard, do twice weekly 120 g 3    hydroxychloroquine (Plaquenil) 200 mg tablet TAKE 1 TABLET BY MOUTH TWICE DAILY 90 tablet 1    hydrOXYzine HCL (Atarax) 50 mg tablet Take 1 tablet (50 mg) by mouth once daily as needed.      ipratropium-albuteroL (Duo-Neb) 0.5-2.5 mg/3 mL nebulizer solution Inhale 3 mL every 6 hours if needed.      ketoconazole (NIZOral) 2 % shampoo Shampoo face and scalp, leave on for 8 minutes, rinse off, do 3 times weekly Do not fill before July 20, 2025. 120 mL 11    multivitamin tablet Take 1 tablet by mouth once daily.      pantoprazole (ProtoNix) 40 mg EC tablet Take 1 tablet (40 mg) by mouth 2 times a day.      spironolactone (Aldactone) 25 mg tablet 1 tablet (25 mg) once daily.      tamsulosin (Flomax) 0.4 mg 24 hr capsule Take 1 capsule (0.4 mg) by mouth. AT BEDTIME      tiotropium-olodateroL (Stiolto Respimat) 2.5-2.5 mcg/actuation mist inhaler Inhale 2 Inhalations once daily. 4 g 5    traMADol (Ultram) 50 mg tablet Take 1 tablet (50 mg) by mouth 4 times a day as needed.      Ventolin HFA 90 mcg/actuation inhaler Inhale 2 puffs every 6 hours if needed for wheezing or shortness of breath. 18 g 5     No current facility-administered medications on file prior to visit.

## 2025-08-01 ENCOUNTER — APPOINTMENT (OUTPATIENT)
Dept: NEPHROLOGY | Facility: CLINIC | Age: 60
End: 2025-08-01

## 2025-08-01 VITALS
TEMPERATURE: 98.9 F | DIASTOLIC BLOOD PRESSURE: 67 MMHG | SYSTOLIC BLOOD PRESSURE: 109 MMHG | BODY MASS INDEX: 40.77 KG/M2 | HEART RATE: 84 BPM | OXYGEN SATURATION: 94 % | HEIGHT: 68 IN | WEIGHT: 269 LBS

## 2025-08-01 DIAGNOSIS — N18.30 STAGE 3 CHRONIC KIDNEY DISEASE, UNSPECIFIED WHETHER STAGE 3A OR 3B CKD (MULTI): ICD-10-CM

## 2025-08-01 DIAGNOSIS — I10 ESSENTIAL HYPERTENSION: Primary | ICD-10-CM

## 2025-08-01 DIAGNOSIS — Z79.1 NSAID LONG-TERM USE: ICD-10-CM

## 2025-08-01 DIAGNOSIS — M32.10 SYSTEMIC LUPUS ERYTHEMATOSUS, ORGAN OR SYSTEM INVOLVEMENT UNSPECIFIED (MULTI): ICD-10-CM

## 2025-08-01 PROCEDURE — 3074F SYST BP LT 130 MM HG: CPT | Performed by: NURSE PRACTITIONER

## 2025-08-01 PROCEDURE — 3008F BODY MASS INDEX DOCD: CPT | Performed by: NURSE PRACTITIONER

## 2025-08-01 PROCEDURE — 3078F DIAST BP <80 MM HG: CPT | Performed by: NURSE PRACTITIONER

## 2025-08-01 PROCEDURE — 99213 OFFICE O/P EST LOW 20 MIN: CPT | Performed by: NURSE PRACTITIONER

## 2025-08-01 ASSESSMENT — ENCOUNTER SYMPTOMS
GASTROINTESTINAL NEGATIVE: 1
CARDIOVASCULAR NEGATIVE: 1
NEUROLOGICAL NEGATIVE: 1
ARTHRALGIAS: 0
ENDOCRINE NEGATIVE: 1
BACK PAIN: 0
CONSTITUTIONAL NEGATIVE: 1
PSYCHIATRIC NEGATIVE: 1
EYES NEGATIVE: 1
HEMATOLOGIC/LYMPHATIC NEGATIVE: 1
RESPIRATORY NEGATIVE: 1

## 2025-08-01 NOTE — PROGRESS NOTES
History Of Present Illness  Allen Vela is a 60 y.o. male with medical history significant for CKD3b/4, HTN, HLD, asthma, COPD, cervical spondylosis, gout, AMRITA, excessive NSAIDs use, and systemic lupus erythematous (SLE) who presents for a 6-month fuv for CKD.     Past Medical History  As above.    Surgical History  He has a past surgical history that includes Sneads tooth extraction; Colonoscopy; and Lumbar epidural injection (05/14/2024).     Social History  He reports that he quit smoking about 15 years ago. His smoking use included cigarettes. He started smoking about 35 years ago. He has a 10 pack-year smoking history. He has never used smokeless tobacco. He reports that he does not currently use alcohol. He reports that he does not currently use drugs after having used the following drugs: Marijuana and Ketamine.    Family History  Family History   Problem Relation Name Age of Onset    COPD Mother      Heart failure Mother      Heart failure Father      No Known Problems Sister      Lupus Sister          skin    No Known Problems Sister      No Known Problems Sister      No Known Problems Brother      Diabetes Maternal Grandmother      Hypertension Father [place]     Alcohol abuse Mother Jacquie     Hypertension Mother Jacquie     Arthritis Mother Jacquie     Asthma Father [place]     Arthritis Father [place]     Gout Maternal Grandmother Crystal Fagan     Autoimmune disease Sister Ashanti Pratt     Autoimmune disease Sister Ashanti Pratt         Allergies  Montelukast and Ace inhibitors    Review of Systems   Constitutional: Negative.    HENT: Negative.     Eyes: Negative.    Respiratory: Negative.     Cardiovascular: Negative.    Gastrointestinal: Negative.    Endocrine: Negative.    Genitourinary: Negative.    Musculoskeletal:  Negative for arthralgias and back pain.   Skin: Negative.    Neurological: Negative.    Hematological: Negative.    Psychiatric/Behavioral: Negative.     All  "other systems reviewed and are negative.            Last Recorded Vitals  Blood pressure 109/67, pulse 84, temperature 37.2 °C (98.9 °F), temperature source Oral, height 1.727 m (5' 8\"), weight 122 kg (269 lb), SpO2 94%.    Relevant Results       Recent Results (from the past 8 weeks)   Renal Function Panel    Collection Time: 07/17/25  3:22 PM   Result Value Ref Range    GLUCOSE 88 65 - 99 mg/dL    UREA NITROGEN (BUN) 21 7 - 25 mg/dL    CREATININE 2.14 (H) 0.70 - 1.35 mg/dL    EGFR 34 (L) > OR = 60 mL/min/1.73m2    BUN/CREATININE RATIO 10 6 - 22 (calc)    SODIUM 141 135 - 146 mmol/L    POTASSIUM 4.3 3.5 - 5.3 mmol/L    CHLORIDE 103 98 - 110 mmol/L    CARBON DIOXIDE 27 20 - 32 mmol/L    CALCIUM 9.9 8.6 - 10.3 mg/dL    PHOSPHATE (AS PHOSPHORUS) 3.0 2.5 - 4.5 mg/dL    ALBUMIN 4.4 3.6 - 5.1 g/dL   Vitamin D 25-Hydroxy,Total (for eval of Vitamin D levels)    Collection Time: 07/17/25  3:22 PM   Result Value Ref Range    VITAMIN D,25-OH,TOTAL,IA 42 30 - 100 ng/mL   Parathyroid Hormone, Intact    Collection Time: 07/17/25  3:22 PM   Result Value Ref Range    PARATHYROID HORMONE, INTACT 75 16 - 77 pg/mL   Urinalysis with Reflex Microscopic    Collection Time: 07/17/25  3:22 PM   Result Value Ref Range    COLOR YELLOW YELLOW    APPEARANCE CLEAR CLEAR    SPECIFIC GRAVITY 1.010 1.001 - 1.035    PH 6.5 5.0 - 8.0    GLUCOSE NEGATIVE NEGATIVE    BILIRUBIN NEGATIVE NEGATIVE    KETONES NEGATIVE NEGATIVE    OCCULT BLOOD NEGATIVE NEGATIVE    PROTEIN NEGATIVE NEGATIVE    NITRITE NEGATIVE NEGATIVE    LEUKOCYTE ESTERASE NEGATIVE NEGATIVE   Albumin-Creatinine Ratio, Urine Random    Collection Time: 07/17/25  3:22 PM   Result Value Ref Range    CREATININE, RANDOM URINE 52 20 - 320 mg/dL    ALBUMIN, URINE 0.8 See Note: mg/dL    ALBUMIN/CREATININE RATIO, RANDOM URINE 15 <30 mg/g creat   CBC    Collection Time: 07/17/25  3:22 PM   Result Value Ref Range    WHITE BLOOD CELL COUNT 5.9 3.8 - 10.8 Thousand/uL    RED BLOOD CELL COUNT 3.80 (L) " 4.20 - 5.80 Million/uL    HEMOGLOBIN 12.4 (L) 13.2 - 17.1 g/dL    HEMATOCRIT 39.9 38.5 - 50.0 %    .0 (H) 80.0 - 100.0 fL    MCH 32.6 27.0 - 33.0 pg    MCHC 31.1 (L) 32.0 - 36.0 g/dL    RDW 13.9 11.0 - 15.0 %    PLATELET COUNT 266 140 - 400 Thousand/uL    MPV 9.7 7.5 - 12.5 fL         Assessment/Plan     60 y.o. male with medical history significant for CKD3b/4, HTN, HLD, asthma, COPD, cervical spondylosis, gout, AMRITA, excessive NSAIDs use, and systemic lupus erythematous (SLE) who presents for a 6-month fuv for CKD.     # CKD3b/4: baseline sCr 1.8 - 2.8 mg/dL with eGFR 25 - 44 ml/min/1.73m2. Likely d/t hypertensive kidney disease, lupus-associated kidney disease, and NSAIDs-associated kidney disease given hx of long-term excessive use of NSAIDs. Most recent sCr 2.14 mg/dL with eGFR 34 ml/min/1.73m2 (25) - at baseline. Currently stable.      # HTN: BP well controlled with current regimen.     # Lupus: dx in . No frequent flare-ups. f/u with rheumatology.     # Hx of NSAIDs use: long-term use for chronic pain. Has stopped since last visit.     Plan:   - Continue to follow up with PCP for optimal HTN management. Goal /80 mmHg for renal protection.   - Continue to follow up with rheumatology for optimal lupus management.   - No changes with current medications.   - Reviewed strategies for preserving remaining kidney function includin) Avoidance of NSAIDs including Aleve (Naprosyn), Motrin (Ibuprofen), Mobic (Meloxicam), Celebrex (Celecoxib) and aspirin as well as PPI acid blocking medications such as Prilosec (omeprazole), Protonix (pantoprazole), and Nexium (esomeprazole), as well as other nephrotoxic agents.   2) Avoidance of tobacco or alcohol use.   3) Adequate hydration daily.   4) Blood pressure target 130/80 mmHg.   5) Daily dietary sodium intake less than 2 grams per day.    6) Maintain healthy lifestyle. Healthy diet and regular exercises.   7) Maintain ideal weight.   - FUV: in 6  months or sooner if concerns arise.     Patient verbalized understanding of above. He will not hesitate to contact Division of Nephrology at [phone] with concerns/questions.    I spent 20 minutes in the professional and overall care of this patient.      Izabela Lundberg, CNP

## 2025-08-07 ENCOUNTER — APPOINTMENT (OUTPATIENT)
Dept: PULMONOLOGY | Facility: HOSPITAL | Age: 60
End: 2025-08-07
Payer: MEDICARE

## 2025-08-07 ENCOUNTER — OFFICE VISIT (OUTPATIENT)
Dept: PULMONOLOGY | Facility: HOSPITAL | Age: 60
End: 2025-08-07
Payer: MEDICARE

## 2025-08-07 VITALS
SYSTOLIC BLOOD PRESSURE: 134 MMHG | OXYGEN SATURATION: 98 % | HEART RATE: 85 BPM | BODY MASS INDEX: 40.9 KG/M2 | TEMPERATURE: 97.8 F | DIASTOLIC BLOOD PRESSURE: 88 MMHG | WEIGHT: 269 LBS

## 2025-08-07 DIAGNOSIS — R91.1 PULMONARY NODULE: ICD-10-CM

## 2025-08-07 DIAGNOSIS — J30.9 ALLERGIC RHINITIS, UNSPECIFIED SEASONALITY, UNSPECIFIED TRIGGER: ICD-10-CM

## 2025-08-07 DIAGNOSIS — J42 CHRONIC BRONCHITIS, UNSPECIFIED CHRONIC BRONCHITIS TYPE (MULTI): Primary | ICD-10-CM

## 2025-08-07 DIAGNOSIS — Z87.891 FORMER SMOKER: ICD-10-CM

## 2025-08-07 PROCEDURE — 99214 OFFICE O/P EST MOD 30 MIN: CPT | Performed by: NURSE PRACTITIONER

## 2025-08-07 PROCEDURE — 99213 OFFICE O/P EST LOW 20 MIN: CPT | Performed by: NURSE PRACTITIONER

## 2025-08-07 PROCEDURE — 3079F DIAST BP 80-89 MM HG: CPT | Performed by: NURSE PRACTITIONER

## 2025-08-07 PROCEDURE — 3075F SYST BP GE 130 - 139MM HG: CPT | Performed by: NURSE PRACTITIONER

## 2025-08-07 RX ORDER — ALBUTEROL SULFATE 0.83 MG/ML
2.5 SOLUTION RESPIRATORY (INHALATION) 4 TIMES DAILY PRN
Qty: 180 ML | Refills: 3 | Status: SHIPPED | OUTPATIENT
Start: 2025-08-07 | End: 2026-08-07

## 2025-08-07 RX ORDER — FLUTICASONE PROPIONATE 110 UG/1
1 AEROSOL, METERED RESPIRATORY (INHALATION) 2 TIMES DAILY
Qty: 12 G | Refills: 3 | Status: SHIPPED | OUTPATIENT
Start: 2025-08-07 | End: 2025-09-06

## 2025-08-07 ASSESSMENT — ENCOUNTER SYMPTOMS
RHINORRHEA: 0
ARTHRALGIAS: 0
PALPITATIONS: 1
DIZZINESS: 0
FEVER: 0
NECK PAIN: 1
WEAKNESS: 0
NERVOUS/ANXIOUS: 1
NUMBNESS: 0
NAUSEA: 0
HEADACHES: 0
ABDOMINAL PAIN: 0
EYE PAIN: 0
MYALGIAS: 0
SINUS PRESSURE: 0
AGITATION: 0
JOINT SWELLING: 0
BACK PAIN: 1
DIARRHEA: 0
FATIGUE: 0
VOMITING: 0
VOICE CHANGE: 0

## 2025-08-07 ASSESSMENT — PAIN SCALES - GENERAL: PAINLEVEL_OUTOF10: 0-NO PAIN

## 2025-08-07 NOTE — PATIENT INSTRUCTIONS
#COPD: Pfts : mild obstruction with BD resp. Recurrent pneumonias - switched from advair to stiolto related to this. May need intermittent ICS depending on symptom improvement with steroids.  Updated PFTs with slight drop in FEV1 and FVC over the last 2 years - with BD resp.   - continue bevespi 2 puffs twice daily   - start flovent 2 puffs twice daily on bad days --- remember to rinse mouth out afterwards  - continue albuterol HFA inhaler 2 puffs or albuterol nebulizer treatment every 4-6 hours as needed for shortness of breath     - UTD with vaccines  - will order nebulizer supplies      #Lung cancer screening: CT 11/2 with resolution of 7mm nodule   - lung cancer screening in 11/2025 [phone]     # Allergic rhinitis/ deviated septum :   - continue nasal saline spray each nostril 1-2 x per day - remember to aim towards your ear -- and whichever OTC nasal spray you have been using   - will refer to ENT sinus     Thank you for visiting the Pulmonary clinic today!   Return to clinic 6 months after CT chest or sooner if needed   Yoanna Soliz CNP  My office -  [phone]- Giovanny is my . April is my nurse [phone].   Radiology scheduling [phone]   Appointment scheduling [phone]   Pulmonary function testing - [phone]

## 2025-08-07 NOTE — PROGRESS NOTES
Patient: Allen Vela    [id number]  : 1965 -- AGE 60 y.o.    Provider: KERVIN Emanuel-CNP     Location [facility]   Service Date: 2025              [facility]  Follow up Visit Note      HISTORY OF PRESENT ILLNESS     The patient's referring provider is: No ref. provider found    HISTORY OF PRESENT ILLNESS   Allen Vela is a 60 y.o. male who presents to a [facility] for an evaluation with concerns of No chief complaint on file.. I have independently interviewed and examined the patient in the office and reviewed available records.    Current History    Since last visit he has been using bevespi twice daily and helps some.  On a good week he uses his albuterol once a day - on a bad day its 3-4 x a day. He has not been using his albuterol nebulizers -- does not have supplies or solution. He has episodes where he has increased shortness of breath for a week or two. He has RUCKER with doing things around the house -other times he is ok.  He has a slight cough - has chest tightness. He states his upper chest feels tight/ inflamed.  He has noticed some wheezing. On the bad days he will have SOB at rest. He has sinus issues -- using nasal sprays. He states the spray helps for a short time, but doesn't last the full 12 hours its supposed to. He feels he needs to have his septum repaired - he feels the right side is always congested. The times he has increased shortness of breath correlate with when he is having increased sinus issues.  GERD is under good control on daily pantoprazole. He is on the 3rd floor of a house - has issues with the stairs. He has had episodes of palpitations - states his PCP has ordered him a holter monitor. States episodes happen both at rest and with exertion.      CAT 20     25: Since last visit he has been doing well. He received a prednisone course last visit. He felt at  that time he was having fluid retention - had LE edema at that time. He has been using his stiolto daily, albuterol HFA 2x a day, and albuterol nebulizers not since our last visit. PFTs scheduled last this month.  He denies any cough, wheezing, SOB at rest, or CP. RUCKER has improved back to his baseline - has RUCKER with going up stairs. He lives on the 3rd floor. He has intermittent nasal congestion - uses an OTC nasal spray PRN. His GERD is under good control with daily pantoprazole.   CAT Today 18 11/11/24: On today's visit, the patient reports he was previously using advair -- was switched to stiolto related to concern for pneumonia. He states it was after his breathing tests. He has not had pneumonia over the last year. He was hospitalized in 2022 for pneumonia for two weeks.  He states he had pneumonia 2-3 x prior to that and each time he had to be hospitalized. He is using stiolto daily and albuterol HFA (ventolin) - lately several times per day. He has a nebulizer machine, but has been using and it does not seem to be helping. His PCP gave him a medrol dose pack last week - has not seemed to help. He feels he might have bronchitis. He has an occasional cough that can be productive. He states his chest feels irritated/ tight. He has been noticing wheezing. He feels his RUCKER has been worse over the last 2-3 weeks. He lives on the 3rd floor (of his sisters home). He has had issues with doing the steps. Normally he gets a little bit winded vs no issues. He has had SOB at rest. He had a slight sore throat - comes and goes. He has sinus congestion - uses a nasal spray if he absolutely has to. He feels this has been slightly worse since turning the heat on. He feels his GERD is under good control with daily pantoprazole. He has tooth pain - has an appt with the dentist tomorrow. He states the tooth has been bothering him more over the last month. He was supposed to have it pulled, but hasn't yet. Dentist pulled 3  other teeth recently. He has stabbing pain with eating/ drinking. He has not received any antibiotics from the dentist related to his teeth.  He denies any LE edema.  He got his flu shot 9/11 - he is not sure if that could have been the start of his breathing getting worse.  He did feel run down after receiving it.      CAT today 19     8/30/23 - Dr. Gaxiola - Mr Queen is a 57 yo man with PMHx s/f Asthma, COPD (no PFTs on record), SLE (on Plaquenil), CKD, HTN, DLD who presents to pulmonary clinic to establish care.   Patient reports diagnosed with childhood asthma, only prescribed PRN inhaler up to 4-5 years ago at which time he was diagnosed with COPD at [facility] [place] by [facility] based on PFTs. At that time he was started on Advair BID, continued on Albuterol PRN. He reports breathing is normally at baseline unless he is admitted with a respiratory infection. Last hospitalization was 03/2023 for pneumonia. He required supplemental oxygen while hospitalized but that was stopped on discharged. He does report significant worsening of his breathing immediately post hospitalization that is now most improved. He currently denies any limitations in terms of his breathing, except with prolonged exertion (vacuuming, etc). He has a seldom cough, non productive mostly. He denies fevers, chills. Prior pneumonia was 2 years prior. Denies ever being admitted to ICU or requiring intubation for asthma/COPD. He denies chest pain, orthopnea, PND. Previously had LE edema which is now resolved. He previously had severe GERD symptoms which are now under well controlled. He was previously staying at an apartment with mold issues, had severe allergic rhinitis symptoms which are now resolved.    Previous pulmonary history: He was previously told he has asthma/ COPD.     Inhalers/nebulized medications: advair, albuterol     Hospitalization History: He has not been hospitalized over the last year for breathing related problem.    Sleep history: Hx of AMRITA -  not able to use CPAP     ALLERGIES AND MEDICATIONS     ALLERGIES  Allergies   Allergen Reactions    Montelukast Shortness of breath     Makes breathing worse    Ace Inhibitors Swelling     Pt reports he developed lip swelling when he was given accupril and lisinopril  in the past       MEDICATIONS  Current Outpatient Medications   Medication Sig Dispense Refill    acetaminophen (Tylenol) 500 mg tablet Take 2 tablets (1,000 mg) by mouth every 6 hours if needed for mild pain (1 - 3). Takes 2-3 tabs      allopurinol (Zyloprim) 100 mg tablet Take 1 tablet (100 mg) by mouth once daily.      amLODIPine (Norvasc) 5 mg tablet Take 1 tablet (5 mg) by mouth once daily.      atenolol (Tenormin) 100 mg tablet Take 1 tablet (100 mg) by mouth once daily.      atorvastatin (Lipitor) 20 mg tablet Take 1 tablet (20 mg) by mouth once daily.      buPROPion XL (Wellbutrin XL) 150 mg 24 hr tablet 1 tablet (150 mg) once daily in the morning.      cholecalciferol (Vitamin D-3) 50 mcg (2,000 unit) capsule Take 1 capsule (2,000 Units) by mouth once daily.      DULoxetine (Cymbalta) 60 mg DR capsule Take 1 capsule (60 mg) by mouth once daily.      Farxiga 10 mg tablet Take 1 tablet (10 mg) by mouth once daily. TAKE ONE TABLET BY MOUTH ONCE DAILY IN THE MORNING 30 tablet 3    furosemide (Lasix) 20 mg tablet Take 1 tablet (20 mg) by mouth once daily.      glycopyrrolate-formoteroL (Bevespi Aerosphere) 9-4.8 mcg HFA aerosol inhaler Inhale 2 Inhalations 2 times a day. 10.7 g 3    guaiFENesin (Mucinex) 600 mg 12 hr tablet Take 1 tablet (600 mg) by mouth every 12 hours if needed.      hydrocortisone 2.5 % ointment Use thin layer on scaling areas massage into beard, do twice weekly 120 g 3    hydroxychloroquine (Plaquenil) 200 mg tablet TAKE 1 TABLET BY MOUTH TWICE DAILY 90 tablet 1    hydrOXYzine HCL (Atarax) 50 mg tablet Take 1 tablet (50 mg) by mouth once daily as needed.      ipratropium-albuteroL (Duo-Neb) 0.5-2.5 mg/3 mL nebulizer solution  Inhale 3 mL every 6 hours if needed.      ketoconazole (NIZOral) 2 % shampoo Shampoo face and scalp, leave on for 8 minutes, rinse off, do 3 times weekly Do not fill before July 20, 2025. 120 mL 11    multivitamin tablet Take 1 tablet by mouth once daily.      pantoprazole (ProtoNix) 40 mg EC tablet Take 1 tablet (40 mg) by mouth 2 times a day.      spironolactone (Aldactone) 25 mg tablet 1 tablet (25 mg) once daily.      traMADol (Ultram) 50 mg tablet Take 1 tablet (50 mg) by mouth 4 times a day as needed.      Ventolin HFA 90 mcg/actuation inhaler Inhale 2 puffs every 6 hours if needed for wheezing or shortness of breath. 18 g 5     No current facility-administered medications for this visit.         PAST HISTORY     PAST MEDICAL HISTORY  - anemia   - anxiety / depression (on ketamine for depression)  - CKD   - asthma/ COPD   - HTN   - HLD   - SLE - on plaquenil   - AMRITA - not on CPAP     PAST SURGICAL HISTORY  Past Surgical History:   Procedure Laterality Date    COLONOSCOPY      LUMBAR EPIDURAL INJECTION  05/14/2024    WISDOM TOOTH EXTRACTION      as a teen       IMMUNIZATION HISTORY  Immunization History   Administered Date(s) Administered    Moderna COVID-19 vaccine, 12 years and older (50mcg/0.5mL)(Spikevax) 02/06/2025    Moderna SARS-CoV-2 Vaccination 03/10/2021    Pfizer COVID-19 vaccine, 12 years and older, (30mcg/0.3mL) (Comirnaty) 11/10/2023    Pfizer COVID-19 vaccine, bivalent, age 12 years and older (30 mcg/0.3 mL) 06/22/2023       SOCIAL HISTORY  Smoking: Former smoker 25 pack years (1.5ppd)- quit in 2010   Alcohol: none   Illicit drugs:  THC/ CBD edibles     OCCUPATIONAL/ENVIRONMENTAL HISTORY  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 KEVIN 2   1/28/25 - 360m (LLN 304m) 96-91% on RA - KEVIN 2     Chest Radiograph     XR chest 2 views 10/09/2024  Impression  1.  No evidence of acute cardiopulmonary process.        Chest CT Scan     3/7/23 -   Impression   Bilateral infiltrates consistent with pneumonia. Follow-up to ensure  resolution after appropriate treatment recommended.     5/13/23 - Resolution of infiltrates in the chest. No acute finding seen   detected. Enlarged main pulmonary artery compatible pulmonary   arterial hypertension     4/30/24 - Compared to prior examination of 05/12/2023, there has been  interval development of several new nodules throughout lungs  bilaterally, measuring up to 7 mm in the left upper lobe. Overall,  the findings are favored to represent an infectious/inflammatory in  etiology. Follow-up with chest CT in 6 months is recommended to  document resolution.  2. No focal consolidation.  3. Additional findings as above.    11/2/24 - Resolution of previously seen 7 mm left upper lobe nodule and  resolution/improvement of additional minute nodularities. No new  suspicious lesions seen. Additional chronic findings including  atherosclerotic vascular calcifications and colonic diverticulosis.    Echocardiogram     3/2/23- Left ventricular systolic function is normal with a 60-65% estimated ejection fraction.  2. Spectral Doppler shows a pseudonormal pattern of left ventricular diastolic filling.  3. Moderately elevated right ventricular systolic pressure.  RA mildly dilated. RV normal size and function        Other testing/ Labs      Eosinophils Absolute (x10*3/uL)   Date Value   12/04/2024 0.11   10/09/2024 0.00   08/14/2024 0.20     No results found for: \"IGE\"    Respiratory Culture  No results found for: \"RESPCULTSM\", \"GRAMSTAIN\"  No results found for the last 90 days.      Fungal Culture  No results " "found for: \"FUNGALCULTSM\", \"FUNGALSMEAR\"    AFB Culture  No results found for: \"AFBCX\", \"AFBSTAIN\"      REVIEW OF SYSTEMS     REVIEW OF SYSTEMS  Review of Systems   Constitutional:  Negative for fatigue and fever.   HENT:  Negative for congestion, postnasal drip, rhinorrhea, sinus pressure and voice change.    Eyes:  Negative for pain and visual disturbance.   Cardiovascular:  Positive for palpitations. Negative for chest pain and leg swelling.   Gastrointestinal:  Negative for abdominal pain, diarrhea, nausea and vomiting.   Endocrine: Negative for cold intolerance and heat intolerance.   Musculoskeletal:  Positive for back pain and neck pain. Negative for arthralgias, joint swelling and myalgias.   Skin:  Negative for rash.   Neurological:  Negative for dizziness, weakness, numbness and headaches.   Psychiatric/Behavioral:  Negative for agitation. The patient is nervous/anxious.         +depression         PHYSICAL EXAM     VITAL SIGNS: There were no vitals taken for this visit.     CURRENT WEIGHT: [unfilled]  BMI: [unfilled]  PREVIOUS WEIGHTS:  Wt Readings from Last 3 Encounters:   08/01/25 122 kg (269 lb)   01/06/25 123 kg (271 lb)   12/13/24 125 kg (275 lb)       Physical Exam  Vitals reviewed.   Constitutional:       General: He is not in acute distress.     Appearance: Normal appearance. He is obese. He is not ill-appearing or toxic-appearing.   HENT:      Head: Normocephalic.      Nose: No rhinorrhea.     Cardiovascular:      Rate and Rhythm: Normal rate and regular rhythm.      Heart sounds: Normal heart sounds.   Pulmonary:      Effort: Pulmonary effort is normal. No respiratory distress.      Breath sounds: Normal breath sounds. No stridor. No wheezing, rhonchi or rales.   Abdominal:      General: Abdomen is flat.     Musculoskeletal:         General: Normal range of motion.      Right lower leg: No edema.      Left lower leg: No edema.     Skin:     General: Skin is warm and dry.      Nails: There is no " clubbing.     Neurological:      General: No focal deficit present.      Mental Status: He is alert and oriented to person, place, and time.     Psychiatric:         Mood and Affect: Mood normal.         Behavior: Behavior normal.         Judgment: Judgment normal.         ASSESSMENT/PLAN     #COPD: Pfts : mild obstruction with BD resp. Recurrent pneumonias - switched from advair to stiolto related to this. Updated PFTs with slight drop in FEV1 and FVC over the last 2 years - with BD resp.   - continue bevespi 2 puffs twice daily   - start flovent 2 puffs twice daily on bad days --- remember to rinse mouth out afterwards  - continue albuterol HFA inhaler 2 puffs or albuterol nebulizer treatment every 4-6 hours as needed for shortness of breath     - UTD with vaccines  - will order nebulizer supplies      #Lung cancer screening: CT 11/2 with resolution of 7mm nodule   - lung cancer screening in 11/2025 [phone]     # Allergic rhinitis/ deviated septum :   - continue nasal saline spray each nostril 1-2 x per day - remember to aim towards your ear -- and whichever OTC nasal spray you have been using   - will refer to ENT sinus     Thank you for visiting the Pulmonary clinic today!   Return to clinic 6 months after CT chest or sooner if needed   Yoanna Soliz CNP  My office -  [phone]- Giovanny is my . April is my nurse [phone].   Radiology scheduling [phone]   Appointment scheduling [phone]   Pulmonary function testing - [phone]

## 2025-08-14 ENCOUNTER — APPOINTMENT (OUTPATIENT)
Dept: CARDIOLOGY | Facility: CLINIC | Age: 60
End: 2025-08-14
Payer: MEDICARE

## 2025-08-18 DIAGNOSIS — J42 CHRONIC BRONCHITIS, UNSPECIFIED CHRONIC BRONCHITIS TYPE (MULTI): ICD-10-CM

## 2025-08-18 RX ORDER — FLUTICASONE FUROATE 200 UG/1
1 POWDER RESPIRATORY (INHALATION) DAILY
Qty: 1 EACH | Refills: 3 | Status: SHIPPED | OUTPATIENT
Start: 2025-08-18

## 2025-08-19 ENCOUNTER — APPOINTMENT (OUTPATIENT)
Dept: CARDIOLOGY | Facility: CLINIC | Age: 60
End: 2025-08-19
Payer: MEDICARE

## 2025-08-20 ENCOUNTER — APPOINTMENT (OUTPATIENT)
Dept: RHEUMATOLOGY | Facility: CLINIC | Age: 60
End: 2025-08-20
Payer: COMMERCIAL

## 2025-08-26 ENCOUNTER — ANCILLARY PROCEDURE (OUTPATIENT)
Dept: CARDIOLOGY | Facility: CLINIC | Age: 60
End: 2025-08-26
Payer: MEDICARE

## 2025-08-26 DIAGNOSIS — R00.2 PALPITATIONS: ICD-10-CM

## 2025-08-26 PROCEDURE — 93247 EXT ECG>7D<15D SCAN A/R: CPT

## 2025-09-10 ENCOUNTER — APPOINTMENT (OUTPATIENT)
Dept: OTOLARYNGOLOGY | Facility: CLINIC | Age: 60
End: 2025-09-10
Payer: MEDICARE

## 2025-11-05 ENCOUNTER — APPOINTMENT (OUTPATIENT)
Dept: RHEUMATOLOGY | Facility: CLINIC | Age: 60
End: 2025-11-05
Payer: MEDICARE

## 2025-11-12 ENCOUNTER — APPOINTMENT (OUTPATIENT)
Dept: RHEUMATOLOGY | Facility: CLINIC | Age: 60
End: 2025-11-12
Payer: MEDICARE

## 2026-02-06 ENCOUNTER — APPOINTMENT (OUTPATIENT)
Dept: NEPHROLOGY | Facility: CLINIC | Age: 61
End: 2026-02-06
Payer: MEDICARE

## 2026-07-06 ENCOUNTER — APPOINTMENT (OUTPATIENT)
Dept: OPHTHALMOLOGY | Facility: CLINIC | Age: 61
End: 2026-07-06
Payer: MEDICARE

## 2026-07-16 ENCOUNTER — APPOINTMENT (OUTPATIENT)
Dept: DERMATOLOGY | Facility: CLINIC | Age: 61
End: 2026-07-16
Payer: MEDICARE

## (undated) DEVICE — HIGH FLOW TIP FOR INTERPULSE HANDPIECE SET

## (undated) DEVICE — BLADE, SAW, DUAL SAGITTAL, 1.9 X 90 X 30

## (undated) DEVICE — SPONGE, LAP, XRAY DECT, 18IN X 18IN, W/MASTER DMT, STERILE

## (undated) DEVICE — SUTURE, VICRYL, 0, 27 IN, CT-2, UNDYED

## (undated) DEVICE — SUTURE, MONOCRYL, 3-0, 27 IN, PS-2, UNDYED

## (undated) DEVICE — DRAPE, INSTRUMENT, W/POUCH, STERI DRAPE, 7 X 11 IN, DISPOSABLE, STERILE

## (undated) DEVICE — IRRIGATION SYSTEM, WOUND, SURGIPHOR, 450ML, STERILE

## (undated) DEVICE — INTERPULSE HANDPIECE SET W/ 10FT SUCTION TUBING

## (undated) DEVICE — DRILL BIT, 4.0MM CALIBRATED, GRADUATED DEPTH MARKS

## (undated) DEVICE — MASK, FACE, TENET, FOAM POSITIONING, DISPOSABLE

## (undated) DEVICE — SUTURE, ETHIBOND XTRA, 5 V-37, GRN/BR, LF

## (undated) DEVICE — GLOVE, SURGICAL, PROTEXIS PI MICRO, 7.0, PF, LF

## (undated) DEVICE — ADHESIVE, SKIN, LIQUIBAND EXCEED

## (undated) DEVICE — DRILL BIT

## (undated) DEVICE — KIT, BEACH CHAIR TRIMANO

## (undated) DEVICE — SYRINGE, 60 CC, IRRIGATION, TOOMEY TIP

## (undated) DEVICE — GUIDE, REAMER, BONE TAP, 6.5MM, RSP, SHOULDER

## (undated) DEVICE — SUTURE, VICRYL, 0, 36 IN, CT-1, UNDYED

## (undated) DEVICE — CONTAINER, SPECIMEN, 4 OZ, OR PEEL PACK, STERILE

## (undated) DEVICE — RETRIEVER, SUTURE, HEWSON

## (undated) DEVICE — HOOD, SURGICAL, FLYTE SURGICOOL

## (undated) DEVICE — SUTURE, VICRYL, 2-0, 18 IN, CT-1, UNDYED

## (undated) DEVICE — DRAPE, INCISE, ANTIMICROBIAL, IOBAN 2, LARGE, 17 X 23 IN, DISPOSABLE, STERILE

## (undated) DEVICE — SUTURE, MONOCRYL, 27 IN, 3-0, SH-1, UNDYED

## (undated) DEVICE — DRAPE, SHEET, U, STERI DRAPE, 47 X 51 IN, DISPOSABLE, STERILE

## (undated) DEVICE — Device

## (undated) DEVICE — WOUND SYSTEM, DEBRIDEMENT & CLEANING, O.R DUOPAK

## (undated) DEVICE — GLOVE, SURGICAL, PROTEXIS PI BLUE W/NEUTHERA, 7.5, PF, LF

## (undated) DEVICE — SOLUTION, IRRIGATION, SODIUM CHLORIDE 0.9%, 1000 ML, POUR BOTTLE

## (undated) DEVICE — DRESSING, MEPILEX BORDER, POST-OP AG, 4 X 10 IN

## (undated) DEVICE — TIP, SUCTION, YANKAUER, FLEXIBLE

## (undated) DEVICE — SUTURE, CTD, VICRYL, 2-0, UND, BR, CT-2

## (undated) DEVICE — PAD, GROUNDING, ELECTROSURGICAL, W/9 FT CABLE, POLYHESIVE II, ADULT, LF

## (undated) DEVICE — DRAPE, SHEET, 17 X 23 IN